# Patient Record
Sex: MALE | Race: ASIAN | NOT HISPANIC OR LATINO | URBAN - METROPOLITAN AREA
[De-identification: names, ages, dates, MRNs, and addresses within clinical notes are randomized per-mention and may not be internally consistent; named-entity substitution may affect disease eponyms.]

---

## 2017-09-25 RX ORDER — CALCIUM CARBONATE 500(1250)
1 TABLET ORAL
Qty: 0 | Refills: 0 | COMMUNITY
Start: 2017-09-25

## 2017-09-25 RX ORDER — CHOLECALCIFEROL (VITAMIN D3) 125 MCG
1 CAPSULE ORAL
Qty: 0 | Refills: 0 | DISCHARGE
Start: 2017-09-25

## 2017-10-05 ENCOUNTER — INPATIENT (INPATIENT)
Facility: HOSPITAL | Age: 67
LOS: 4 days | Discharge: HOME HEALTH | End: 2017-10-10
Attending: INTERNAL MEDICINE | Admitting: INTERNAL MEDICINE
Payer: MEDICAID

## 2017-10-05 VITALS
RESPIRATION RATE: 17 BRPM | SYSTOLIC BLOOD PRESSURE: 141 MMHG | OXYGEN SATURATION: 100 % | DIASTOLIC BLOOD PRESSURE: 90 MMHG | TEMPERATURE: 99 F | HEART RATE: 106 BPM

## 2017-10-05 DIAGNOSIS — N39.0 URINARY TRACT INFECTION, SITE NOT SPECIFIED: ICD-10-CM

## 2017-10-05 LAB
ALBUMIN SERPL ELPH-MCNC: 3.7 G/DL — SIGNIFICANT CHANGE UP (ref 3.3–5)
ALP SERPL-CCNC: 85 U/L — SIGNIFICANT CHANGE UP (ref 40–120)
ALT FLD-CCNC: 48 U/L — HIGH (ref 4–41)
APPEARANCE UR: SIGNIFICANT CHANGE UP
AST SERPL-CCNC: 34 U/L — SIGNIFICANT CHANGE UP (ref 4–40)
BACTERIA # UR AUTO: SIGNIFICANT CHANGE UP
BASE EXCESS BLDV CALC-SCNC: -1.9 MMOL/L — SIGNIFICANT CHANGE UP
BASOPHILS # BLD AUTO: 0.02 K/UL — SIGNIFICANT CHANGE UP (ref 0–0.2)
BASOPHILS NFR BLD AUTO: 0.1 % — SIGNIFICANT CHANGE UP (ref 0–2)
BILIRUB SERPL-MCNC: 0.4 MG/DL — SIGNIFICANT CHANGE UP (ref 0.2–1.2)
BILIRUB UR-MCNC: NEGATIVE — SIGNIFICANT CHANGE UP
BLOOD GAS VENOUS - CREATININE: 1.84 MG/DL — HIGH (ref 0.5–1.3)
BLOOD UR QL VISUAL: HIGH
BUN SERPL-MCNC: 23 MG/DL — SIGNIFICANT CHANGE UP (ref 7–23)
CALCIUM SERPL-MCNC: 9.4 MG/DL — SIGNIFICANT CHANGE UP (ref 8.4–10.5)
CHLORIDE BLDV-SCNC: 102 MMOL/L — SIGNIFICANT CHANGE UP (ref 96–108)
CHLORIDE SERPL-SCNC: 96 MMOL/L — LOW (ref 98–107)
CO2 SERPL-SCNC: 21 MMOL/L — LOW (ref 22–31)
COLOR SPEC: YELLOW — SIGNIFICANT CHANGE UP
CREAT SERPL-MCNC: 1.88 MG/DL — HIGH (ref 0.5–1.3)
EOSINOPHIL # BLD AUTO: 0.03 K/UL — SIGNIFICANT CHANGE UP (ref 0–0.5)
EOSINOPHIL NFR BLD AUTO: 0.2 % — SIGNIFICANT CHANGE UP (ref 0–6)
GAS PNL BLDV: 132 MMOL/L — LOW (ref 136–146)
GLUCOSE BLDV-MCNC: 181 — HIGH (ref 70–99)
GLUCOSE SERPL-MCNC: 180 MG/DL — HIGH (ref 70–99)
GLUCOSE UR-MCNC: NEGATIVE — SIGNIFICANT CHANGE UP
HCO3 BLDV-SCNC: 22 MMOL/L — SIGNIFICANT CHANGE UP (ref 20–27)
HCT VFR BLD CALC: 31.2 % — LOW (ref 39–50)
HCT VFR BLDV CALC: 32.9 % — LOW (ref 39–51)
HGB BLD-MCNC: 10.4 G/DL — LOW (ref 13–17)
HGB BLDV-MCNC: 10.7 G/DL — LOW (ref 13–17)
IMM GRANULOCYTES # BLD AUTO: 0.06 # — SIGNIFICANT CHANGE UP
IMM GRANULOCYTES NFR BLD AUTO: 0.4 % — SIGNIFICANT CHANGE UP (ref 0–1.5)
KETONES UR-MCNC: NEGATIVE — SIGNIFICANT CHANGE UP
LACTATE BLDV-MCNC: 1.1 MMOL/L — SIGNIFICANT CHANGE UP (ref 0.5–2)
LEUKOCYTE ESTERASE UR-ACNC: HIGH
LYMPHOCYTES # BLD AUTO: 1.32 K/UL — SIGNIFICANT CHANGE UP (ref 1–3.3)
LYMPHOCYTES # BLD AUTO: 9.9 % — LOW (ref 13–44)
MCHC RBC-ENTMCNC: 27 PG — SIGNIFICANT CHANGE UP (ref 27–34)
MCHC RBC-ENTMCNC: 33.3 % — SIGNIFICANT CHANGE UP (ref 32–36)
MCV RBC AUTO: 81 FL — SIGNIFICANT CHANGE UP (ref 80–100)
MONOCYTES # BLD AUTO: 1.04 K/UL — HIGH (ref 0–0.9)
MONOCYTES NFR BLD AUTO: 7.8 % — SIGNIFICANT CHANGE UP (ref 2–14)
MUCOUS THREADS # UR AUTO: SIGNIFICANT CHANGE UP
NEUTROPHILS # BLD AUTO: 10.88 K/UL — HIGH (ref 1.8–7.4)
NEUTROPHILS NFR BLD AUTO: 81.6 % — HIGH (ref 43–77)
NITRITE UR-MCNC: POSITIVE — HIGH
NRBC # FLD: 0 — SIGNIFICANT CHANGE UP
PCO2 BLDV: 40 MMHG — LOW (ref 41–51)
PH BLDV: 7.37 PH — SIGNIFICANT CHANGE UP (ref 7.32–7.43)
PH UR: 6 — SIGNIFICANT CHANGE UP (ref 4.6–8)
PLATELET # BLD AUTO: 321 K/UL — SIGNIFICANT CHANGE UP (ref 150–400)
PMV BLD: 10.1 FL — SIGNIFICANT CHANGE UP (ref 7–13)
PO2 BLDV: < 24 MMHG — LOW (ref 35–40)
POTASSIUM BLDV-SCNC: 4.2 MMOL/L — SIGNIFICANT CHANGE UP (ref 3.4–4.5)
POTASSIUM SERPL-MCNC: 4.3 MMOL/L — SIGNIFICANT CHANGE UP (ref 3.5–5.3)
POTASSIUM SERPL-SCNC: 4.3 MMOL/L — SIGNIFICANT CHANGE UP (ref 3.5–5.3)
PROT SERPL-MCNC: 8.4 G/DL — HIGH (ref 6–8.3)
PROT UR-MCNC: 100 — SIGNIFICANT CHANGE UP
RBC # BLD: 3.85 M/UL — LOW (ref 4.2–5.8)
RBC # FLD: 14 % — SIGNIFICANT CHANGE UP (ref 10.3–14.5)
RBC CASTS # UR COMP ASSIST: HIGH (ref 0–?)
SAO2 % BLDV: 36.7 % — LOW (ref 60–85)
SODIUM SERPL-SCNC: 134 MMOL/L — LOW (ref 135–145)
SP GR SPEC: 1.01 — SIGNIFICANT CHANGE UP (ref 1–1.03)
SQUAMOUS # UR AUTO: SIGNIFICANT CHANGE UP
UROBILINOGEN FLD QL: NORMAL E.U. — SIGNIFICANT CHANGE UP (ref 0.1–0.2)
WBC # BLD: 13.35 K/UL — HIGH (ref 3.8–10.5)
WBC # FLD AUTO: 13.35 K/UL — HIGH (ref 3.8–10.5)
WBC CLUMPS #/AREA URNS HPF: PRESENT — HIGH (ref 0–?)
WBC UR QL: >50 — HIGH (ref 0–?)

## 2017-10-05 PROCEDURE — 71020: CPT | Mod: 26

## 2017-10-05 PROCEDURE — 99223 1ST HOSP IP/OBS HIGH 75: CPT

## 2017-10-05 PROCEDURE — 72170 X-RAY EXAM OF PELVIS: CPT | Mod: 26

## 2017-10-05 RX ORDER — SODIUM CHLORIDE 9 MG/ML
1000 INJECTION INTRAMUSCULAR; INTRAVENOUS; SUBCUTANEOUS ONCE
Qty: 0 | Refills: 0 | Status: COMPLETED | OUTPATIENT
Start: 2017-10-05 | End: 2017-10-05

## 2017-10-05 RX ORDER — CEFTRIAXONE 500 MG/1
1 INJECTION, POWDER, FOR SOLUTION INTRAMUSCULAR; INTRAVENOUS ONCE
Qty: 0 | Refills: 0 | Status: COMPLETED | OUTPATIENT
Start: 2017-10-05 | End: 2017-10-05

## 2017-10-05 RX ORDER — ACETAMINOPHEN 500 MG
650 TABLET ORAL ONCE
Qty: 0 | Refills: 0 | Status: COMPLETED | OUTPATIENT
Start: 2017-10-05 | End: 2017-10-05

## 2017-10-05 RX ADMIN — Medication 650 MILLIGRAM(S): at 19:56

## 2017-10-05 RX ADMIN — SODIUM CHLORIDE 1000 MILLILITER(S): 9 INJECTION INTRAMUSCULAR; INTRAVENOUS; SUBCUTANEOUS at 19:52

## 2017-10-05 RX ADMIN — CEFTRIAXONE 100 GRAM(S): 500 INJECTION, POWDER, FOR SOLUTION INTRAMUSCULAR; INTRAVENOUS at 21:26

## 2017-10-05 NOTE — ED ADULT NURSE NOTE - OBJECTIVE STATEMENT
Received pt to spot 8 A&Ox4 c/o being sent by nephrologist for admission r/t fevers @ home x 2 months (unknown source) with weakness. Pt noted to ambulate with steady gait to bathroom, denies any other medical complaints, denies CP or SOB, appears comfortable on assessment, reports hx of HTN and DM on januvia. Rectal temp obtained as documented, IV placed, labs sent, VS as documented, will reassess.

## 2017-10-05 NOTE — ED PROVIDER NOTE - PROGRESS NOTE DETAILS
Pt stable. VSS. Labs, imaging reviewed. Per note, will admit to Dr. Butelr as per Dr. Virgen. Dr. Butler and MAR aware.  Elliot Haile MD PGY-4

## 2017-10-05 NOTE — ED PROVIDER NOTE - OBJECTIVE STATEMENT
65 y/o M with PMH of DM, HTN p/w fever x 2 months. Pt reports 2 months of intermittent fevers, everyday for the last week. Pt with dry cough x 1 week. Pt treated for uti x 2 over the last month, denies gu symptoms at this time. Pt also noted to have increasing JUNI. Pt denies recent travel, sick contacts, abd pain, vomiting, back pain. Renal: Param 67 y/o M with PMH of DM, HTN p/w fever x 2 months. Pt reports 2 months of intermittent fevers, everyday for the last week. Pt with dry cough x 1 week. Pt treated for uti x 2 over the last month, denies gu symptoms at this time. Pt also noted to have increasing JUNI. Pt denies recent travel, sick contacts, abd pain, vomiting, back pain. Renal: Param    Attending/Enid: 65 yo M as described above, mult med problems including DM, HTN, p/w ~ 2 months of fever mostly evening, no night sweats, no weight loss, no cough or SOB. Upon ROS noted urinary symptoms, treated early August with Cipro. No HA, CP, SOB, change in bowel habits or back pain.

## 2017-10-05 NOTE — ED PROVIDER NOTE - PHYSICAL EXAMINATION
Attending/Enid:  NAD; PERRL/EOMI, non-icterus, supple, no HILARIO, no JVD, RRR, CTAB; Abd-soft, NT/ND, no HSM; no LE edema, A&Ox3, nonfocal; Skin-warm/dry

## 2017-10-05 NOTE — ED ADULT TRIAGE NOTE - CHIEF COMPLAINT QUOTE
pt c/o of fever x 2 months, pt sent in by nephrologist for admission under MD. arce. paper work with patients states to r/o rheumatic fever. pt states "I think my MD said I have a UTI"

## 2017-10-05 NOTE — ED PROVIDER NOTE - MEDICAL DECISION MAKING DETAILS
fever, r/o sepsis/pna/uti/bacterermia, labs, ivf, cultures, cxr, ua, antipyretics, admit to med for further w/u  ankur, unclear etiology, will admit for further w/u and monitoring

## 2017-10-06 DIAGNOSIS — N40.0 BENIGN PROSTATIC HYPERPLASIA WITHOUT LOWER URINARY TRACT SYMPTOMS: ICD-10-CM

## 2017-10-06 DIAGNOSIS — E87.2 ACIDOSIS: ICD-10-CM

## 2017-10-06 DIAGNOSIS — I10 ESSENTIAL (PRIMARY) HYPERTENSION: ICD-10-CM

## 2017-10-06 DIAGNOSIS — E11.9 TYPE 2 DIABETES MELLITUS WITHOUT COMPLICATIONS: ICD-10-CM

## 2017-10-06 DIAGNOSIS — N17.9 ACUTE KIDNEY FAILURE, UNSPECIFIED: ICD-10-CM

## 2017-10-06 DIAGNOSIS — R63.4 ABNORMAL WEIGHT LOSS: ICD-10-CM

## 2017-10-06 DIAGNOSIS — A41.9 SEPSIS, UNSPECIFIED ORGANISM: ICD-10-CM

## 2017-10-06 LAB
ALBUMIN SERPL ELPH-MCNC: 2.9 G/DL — LOW (ref 3.3–5)
ALP SERPL-CCNC: 75 U/L — SIGNIFICANT CHANGE UP (ref 40–120)
ALT FLD-CCNC: 39 U/L — SIGNIFICANT CHANGE UP (ref 4–41)
AST SERPL-CCNC: 27 U/L — SIGNIFICANT CHANGE UP (ref 4–40)
B PERT DNA SPEC QL NAA+PROBE: SIGNIFICANT CHANGE UP
BASOPHILS # BLD AUTO: 0.02 K/UL — SIGNIFICANT CHANGE UP (ref 0–0.2)
BASOPHILS NFR BLD AUTO: 0.2 % — SIGNIFICANT CHANGE UP (ref 0–2)
BILIRUB SERPL-MCNC: 0.5 MG/DL — SIGNIFICANT CHANGE UP (ref 0.2–1.2)
BUN SERPL-MCNC: 22 MG/DL — SIGNIFICANT CHANGE UP (ref 7–23)
C PNEUM DNA SPEC QL NAA+PROBE: NOT DETECTED — SIGNIFICANT CHANGE UP
CALCIUM SERPL-MCNC: 8.5 MG/DL — SIGNIFICANT CHANGE UP (ref 8.4–10.5)
CHLORIDE SERPL-SCNC: 101 MMOL/L — SIGNIFICANT CHANGE UP (ref 98–107)
CO2 SERPL-SCNC: 17 MMOL/L — LOW (ref 22–31)
CREAT SERPL-MCNC: 1.76 MG/DL — HIGH (ref 0.5–1.3)
EOSINOPHIL # BLD AUTO: 0.02 K/UL — SIGNIFICANT CHANGE UP (ref 0–0.5)
EOSINOPHIL NFR BLD AUTO: 0.2 % — SIGNIFICANT CHANGE UP (ref 0–6)
FLUAV H1 2009 PAND RNA SPEC QL NAA+PROBE: NOT DETECTED — SIGNIFICANT CHANGE UP
FLUAV H1 RNA SPEC QL NAA+PROBE: NOT DETECTED — SIGNIFICANT CHANGE UP
FLUAV H3 RNA SPEC QL NAA+PROBE: NOT DETECTED — SIGNIFICANT CHANGE UP
FLUAV SUBTYP SPEC NAA+PROBE: SIGNIFICANT CHANGE UP
FLUBV RNA SPEC QL NAA+PROBE: NOT DETECTED — SIGNIFICANT CHANGE UP
GLUCOSE SERPL-MCNC: 172 MG/DL — HIGH (ref 70–99)
HADV DNA SPEC QL NAA+PROBE: NOT DETECTED — SIGNIFICANT CHANGE UP
HBA1C BLD-MCNC: 7.3 % — HIGH (ref 4–5.6)
HCOV 229E RNA SPEC QL NAA+PROBE: NOT DETECTED — SIGNIFICANT CHANGE UP
HCOV HKU1 RNA SPEC QL NAA+PROBE: NOT DETECTED — SIGNIFICANT CHANGE UP
HCOV NL63 RNA SPEC QL NAA+PROBE: NOT DETECTED — SIGNIFICANT CHANGE UP
HCOV OC43 RNA SPEC QL NAA+PROBE: NOT DETECTED — SIGNIFICANT CHANGE UP
HCT VFR BLD CALC: 26.6 % — LOW (ref 39–50)
HGB BLD-MCNC: 8.9 G/DL — LOW (ref 13–17)
HMPV RNA SPEC QL NAA+PROBE: NOT DETECTED — SIGNIFICANT CHANGE UP
HPIV1 RNA SPEC QL NAA+PROBE: NOT DETECTED — SIGNIFICANT CHANGE UP
HPIV2 RNA SPEC QL NAA+PROBE: NOT DETECTED — SIGNIFICANT CHANGE UP
HPIV3 RNA SPEC QL NAA+PROBE: NOT DETECTED — SIGNIFICANT CHANGE UP
HPIV4 RNA SPEC QL NAA+PROBE: NOT DETECTED — SIGNIFICANT CHANGE UP
IMM GRANULOCYTES # BLD AUTO: 0.07 # — SIGNIFICANT CHANGE UP
IMM GRANULOCYTES NFR BLD AUTO: 0.6 % — SIGNIFICANT CHANGE UP (ref 0–1.5)
LYMPHOCYTES # BLD AUTO: 1.26 K/UL — SIGNIFICANT CHANGE UP (ref 1–3.3)
LYMPHOCYTES # BLD AUTO: 10.7 % — LOW (ref 13–44)
M PNEUMO DNA SPEC QL NAA+PROBE: NOT DETECTED — SIGNIFICANT CHANGE UP
MAGNESIUM SERPL-MCNC: 1.7 MG/DL — SIGNIFICANT CHANGE UP (ref 1.6–2.6)
MCHC RBC-ENTMCNC: 27.1 PG — SIGNIFICANT CHANGE UP (ref 27–34)
MCHC RBC-ENTMCNC: 33.5 % — SIGNIFICANT CHANGE UP (ref 32–36)
MCV RBC AUTO: 80.9 FL — SIGNIFICANT CHANGE UP (ref 80–100)
METHOD TYPE: SIGNIFICANT CHANGE UP
MONOCYTES # BLD AUTO: 1.21 K/UL — HIGH (ref 0–0.9)
MONOCYTES NFR BLD AUTO: 10.2 % — SIGNIFICANT CHANGE UP (ref 2–14)
NEUTROPHILS # BLD AUTO: 9.23 K/UL — HIGH (ref 1.8–7.4)
NEUTROPHILS NFR BLD AUTO: 78.1 % — HIGH (ref 43–77)
NRBC # FLD: 0 — SIGNIFICANT CHANGE UP
ORGANISM # SPEC MICROSCOPIC CNT: SIGNIFICANT CHANGE UP
ORGANISM # SPEC MICROSCOPIC CNT: SIGNIFICANT CHANGE UP
PHOSPHATE SERPL-MCNC: 2.8 MG/DL — SIGNIFICANT CHANGE UP (ref 2.5–4.5)
PLATELET # BLD AUTO: 283 K/UL — SIGNIFICANT CHANGE UP (ref 150–400)
PMV BLD: 10.9 FL — SIGNIFICANT CHANGE UP (ref 7–13)
POTASSIUM SERPL-MCNC: 3.7 MMOL/L — SIGNIFICANT CHANGE UP (ref 3.5–5.3)
POTASSIUM SERPL-SCNC: 3.7 MMOL/L — SIGNIFICANT CHANGE UP (ref 3.5–5.3)
PROT SERPL-MCNC: 6.9 G/DL — SIGNIFICANT CHANGE UP (ref 6–8.3)
RBC # BLD: 3.29 M/UL — LOW (ref 4.2–5.8)
RBC # FLD: 13.9 % — SIGNIFICANT CHANGE UP (ref 10.3–14.5)
RSV RNA SPEC QL NAA+PROBE: NOT DETECTED — SIGNIFICANT CHANGE UP
RV+EV RNA SPEC QL NAA+PROBE: POSITIVE — HIGH
SODIUM SERPL-SCNC: 135 MMOL/L — SIGNIFICANT CHANGE UP (ref 135–145)
SPECIMEN SOURCE: SIGNIFICANT CHANGE UP
WBC # BLD: 11.81 K/UL — HIGH (ref 3.8–10.5)
WBC # FLD AUTO: 11.81 K/UL — HIGH (ref 3.8–10.5)

## 2017-10-06 PROCEDURE — 99222 1ST HOSP IP/OBS MODERATE 55: CPT

## 2017-10-06 RX ORDER — DEXTROSE 50 % IN WATER 50 %
25 SYRINGE (ML) INTRAVENOUS ONCE
Qty: 0 | Refills: 0 | Status: DISCONTINUED | OUTPATIENT
Start: 2017-10-06 | End: 2017-10-10

## 2017-10-06 RX ORDER — SODIUM BICARBONATE 1 MEQ/ML
650 SYRINGE (ML) INTRAVENOUS THREE TIMES A DAY
Qty: 0 | Refills: 0 | Status: DISCONTINUED | OUTPATIENT
Start: 2017-10-06 | End: 2017-10-10

## 2017-10-06 RX ORDER — SODIUM CHLORIDE 9 MG/ML
1000 INJECTION INTRAMUSCULAR; INTRAVENOUS; SUBCUTANEOUS
Qty: 0 | Refills: 0 | Status: DISCONTINUED | OUTPATIENT
Start: 2017-10-06 | End: 2017-10-10

## 2017-10-06 RX ORDER — TAMSULOSIN HYDROCHLORIDE 0.4 MG/1
0.4 CAPSULE ORAL AT BEDTIME
Qty: 0 | Refills: 0 | Status: DISCONTINUED | OUTPATIENT
Start: 2017-10-06 | End: 2017-10-10

## 2017-10-06 RX ORDER — GLUCAGON INJECTION, SOLUTION 0.5 MG/.1ML
1 INJECTION, SOLUTION SUBCUTANEOUS ONCE
Qty: 0 | Refills: 0 | Status: DISCONTINUED | OUTPATIENT
Start: 2017-10-06 | End: 2017-10-10

## 2017-10-06 RX ORDER — SODIUM CHLORIDE 9 MG/ML
1000 INJECTION, SOLUTION INTRAVENOUS
Qty: 0 | Refills: 0 | Status: DISCONTINUED | OUTPATIENT
Start: 2017-10-06 | End: 2017-10-10

## 2017-10-06 RX ORDER — ACETAMINOPHEN 500 MG
650 TABLET ORAL EVERY 6 HOURS
Qty: 0 | Refills: 0 | Status: DISCONTINUED | OUTPATIENT
Start: 2017-10-06 | End: 2017-10-10

## 2017-10-06 RX ORDER — DEXTROSE 50 % IN WATER 50 %
12.5 SYRINGE (ML) INTRAVENOUS ONCE
Qty: 0 | Refills: 0 | Status: DISCONTINUED | OUTPATIENT
Start: 2017-10-06 | End: 2017-10-10

## 2017-10-06 RX ORDER — SODIUM CHLORIDE 9 MG/ML
1000 INJECTION INTRAMUSCULAR; INTRAVENOUS; SUBCUTANEOUS
Qty: 0 | Refills: 0 | Status: DISCONTINUED | OUTPATIENT
Start: 2017-10-05 | End: 2017-10-06

## 2017-10-06 RX ORDER — INSULIN LISPRO 100/ML
VIAL (ML) SUBCUTANEOUS
Qty: 0 | Refills: 0 | Status: DISCONTINUED | OUTPATIENT
Start: 2017-10-06 | End: 2017-10-10

## 2017-10-06 RX ORDER — CEFTRIAXONE 500 MG/1
1 INJECTION, POWDER, FOR SOLUTION INTRAMUSCULAR; INTRAVENOUS EVERY 24 HOURS
Qty: 0 | Refills: 0 | Status: DISCONTINUED | OUTPATIENT
Start: 2017-10-06 | End: 2017-10-07

## 2017-10-06 RX ORDER — DEXTROSE 50 % IN WATER 50 %
1 SYRINGE (ML) INTRAVENOUS ONCE
Qty: 0 | Refills: 0 | Status: DISCONTINUED | OUTPATIENT
Start: 2017-10-06 | End: 2017-10-10

## 2017-10-06 RX ADMIN — SODIUM CHLORIDE 100 MILLILITER(S): 9 INJECTION INTRAMUSCULAR; INTRAVENOUS; SUBCUTANEOUS at 04:30

## 2017-10-06 RX ADMIN — Medication 650 MILLIGRAM(S): at 04:23

## 2017-10-06 RX ADMIN — SODIUM CHLORIDE 100 MILLILITER(S): 9 INJECTION INTRAMUSCULAR; INTRAVENOUS; SUBCUTANEOUS at 18:23

## 2017-10-06 RX ADMIN — Medication 650 MILLIGRAM(S): at 22:48

## 2017-10-06 RX ADMIN — Medication 1: at 13:06

## 2017-10-06 RX ADMIN — Medication 650 MILLIGRAM(S): at 18:23

## 2017-10-06 RX ADMIN — CEFTRIAXONE 100 GRAM(S): 500 INJECTION, POWDER, FOR SOLUTION INTRAMUSCULAR; INTRAVENOUS at 21:47

## 2017-10-06 RX ADMIN — Medication 1: at 09:06

## 2017-10-06 RX ADMIN — TAMSULOSIN HYDROCHLORIDE 0.4 MILLIGRAM(S): 0.4 CAPSULE ORAL at 22:47

## 2017-10-06 NOTE — H&P ADULT - NSHPLABSRESULTS_GEN_ALL_CORE
.  LABS:                         10.4   13.35 )-----------( 321      ( 05 Oct 2017 19:20 )             31.2     10    134<L>  |  96<L>  |  23  ----------------------------<  180<H>  4.3   |  21<L>  |  1.88<H>    Ca    9.4      05 Oct 2017 19:20    TPro  8.4<H>  /  Alb  3.7  /  TBili  0.4  /  DBili  x   /  AST  34  /  ALT  48<H>  /  AlkPhos  85  10-      Urinalysis Basic - ( 05 Oct 2017 19:30 )    Color: YELLOW / Appearance: HAZY / S.012 / pH: 6.0  Gluc: NEGATIVE / Ketone: NEGATIVE  / Bili: NEGATIVE / Urobili: NORMAL E.U.   Blood: TRACE / Protein: 100 / Nitrite: POSITIVE   Leuk Esterase: LARGE / RBC: 5-10 / WBC >50   Sq Epi: OCC / Non Sq Epi: x / Bacteria: FEW                RADIOLOGY, EKG & ADDITIONAL TESTS: Reviewed.

## 2017-10-06 NOTE — H&P ADULT - PROBLEM SELECTOR PROBLEM 4
JUNI (acute kidney injury) Type 2 diabetes mellitus without complication, without long-term current use of insulin

## 2017-10-06 NOTE — H&P ADULT - HISTORY OF PRESENT ILLNESS
67 yo M with h/o HTN, DM p.w persistent fevers for past 2 months. Pt was seen by his pcp initially who prescribed cipro for a presumed UTI but pt has not had any relief. He continues to have fevers regularly, He has no other symptoms- No dysuria, no diarrhea or constipation, no blood in stool. He recently travelled to [facility] 2 months ago. He has had a very poor appetite with poor PO intake and about 12 lb weight loss since fevers started. About a week ago he developed a non productive cough, no     ED: 141/90  106  99.0  17  100% on RA 67 yo M with h/o HTN, DM p.w persistent fevers for past 2 months. Pt was seen by his pcp initially who prescribed cipro for a presumed UTI but pt has not had any relief. He continues to have fevers regularly, He has no other symptoms- No dysuria, no diarrhea or constipation, no blood in stool. He recently travelled to [facility] 2 months ago. He has had a very poor appetite with poor PO intake and about 12 lb weight loss since fevers started. About a week ago he developed a non productive cough, non productive, no hemoptysis, no night sweats.    ED: 141/90  106  99.0  17  100% on RA

## 2017-10-06 NOTE — CONSULT NOTE ADULT - SUBJECTIVE AND OBJECTIVE BOX
"HPI: 67 yo M with h/o HTN, DM p.w persistent fevers for past 2 months. Pt was seen by his pcp initially who prescribed cipro for a presumed UTI but pt has not had any relief. He continues to have fevers regularly, He has no other symptoms- No dysuria, no diarrhea or constipation, no blood in stool. He recently travelled to [facility] 2 months ago. He has had a very poor appetite with poor PO intake and about 12 lb weight loss since fevers started. About a week ago he developed a non productive cough, non productive, no hemoptysis, no night sweats."    Saw/spoke to patient. Patient has had fevers, dysuria. Left sided abdominal pain radiating towards groin. Recently was told that he had infection of the prostate, and was given a course of Ciprofloxacin prior without improvement. Here, feels better after receiving ceftriaxone, overall feels improved. He recently returned from [facility]. He originally came to [place] ~2 [place] ago. Patient notes that he has also had weight loss over past months which he attributes to poor PO intake. Note that patient has mild, nonproductive cough, no night sweats, no other symptoms or sequelae of TB.     PAST MEDICAL & SURGICAL HISTORY:  HTN (hypertension)  DM (diabetes mellitus)  No significant past surgical history    Allergies    No Known Allergies    ANTIMICROBIALS:  cefTRIAXone   IVPB 1 every 24 hours    OTHER MEDS:  acetaminophen   Tablet 650 milliGRAM(s) Oral every 6 hours PRN  dextrose 5%. 1000 milliLiter(s) IV Continuous <Continuous>  dextrose 50% Injectable 12.5 Gram(s) IV Push once  dextrose 50% Injectable 25 Gram(s) IV Push once  dextrose 50% Injectable 25 Gram(s) IV Push once  dextrose Gel 1 Dose(s) Oral once PRN  glucagon  Injectable 1 milliGRAM(s) IntraMuscular once PRN  insulin lispro (HumaLOG) corrective regimen sliding scale   SubCutaneous three times a day before meals  sodium bicarbonate 650 milliGRAM(s) Oral three times a day  sodium chloride 0.9%. 1000 milliLiter(s) IV Continuous <Continuous>  tamsulosin 0.4 milliGRAM(s) Oral at bedtime    SOCIAL HISTORY: No tobacco, no alcohol, no illicit drugs    FAMILY HISTORY:  No pertinent family history in first degree relatives    Drug Dosing Weight  Height (cm): 162.56 (06 Oct 2017 04:02)  Weight (kg): 63.5 (06 Oct 2017 04:02)  BMI (kg/m2): 24 (06 Oct 2017 04:02)  BSA (m2): 1.68 (06 Oct 2017 04:02)    PE:    Vital Signs Last 24 Hrs  T(C): 37 (06 Oct 2017 13:35), Max: 39.2 (05 Oct 2017 19:56)  T(F): 98.6 (06 Oct 2017 13:35), Max: 102.6 (05 Oct 2017 19:56)  HR: 96 (06 Oct 2017 13:35) (82 - 106)  BP: 125/76 (06 Oct 2017 13:35) (118/58 - 150/71)  BP(mean): --  RR: 18 (06 Oct 2017 13:35) (16 - 20)  SpO2: 97% (06 Oct 2017 13:35) (95% - 100%)    Gen: AOx3, NAD, non-toxic, pleasant  CV: S1+S2 normal, no murmurs, tachycardic  Resp: Clear bilat, no resp distress, no crackles/wheezes  Abd: Soft, nontender, +BS  Ext: No LE edema, no wounds  : No Causey, no CVA tenderness, no suprapubic tenderness  IV/Skin: No thrombophlebitis, no rash  Neuro: No focal deficits, no sensory deficits    LABS:                        8.9    11.81 )-----------( 283      ( 06 Oct 2017 05:30 )             26.6     10-06    135  |  101  |  22  ----------------------------<  172<H>  3.7   |  17<L>  |  1.76<H>    Ca    8.5      06 Oct 2017 05:30  Phos  2.8     10-06  Mg     1.7     10-    TPro  6.9  /  Alb  2.9<L>  /  TBili  0.5  /  DBili  x   /  AST  27  /  ALT  39  /  AlkPhos  75  10-06    Urinalysis Basic - ( 05 Oct 2017 19:30 )    Color: YELLOW / Appearance: HAZY / S.012 / pH: 6.0  Gluc: NEGATIVE / Ketone: NEGATIVE  / Bili: NEGATIVE / Urobili: NORMAL E.U.   Blood: TRACE / Protein: 100 / Nitrite: POSITIVE   Leuk Esterase: LARGE / RBC: 5-10 / WBC >50   Sq Epi: OCC / Non Sq Epi: x / Bacteria: FEW    MICROBIOLOGY:    URINE MIDSTREAM  10-05-17 GNR    RADIOLOGY:    10/5 XR Pelv:    IMPRESSION:  No hip fractures or dislocations.    Intact pelvic and obturator rings and symmetrically aligned and spaced SI   joints and pubic symphysis.     Degenerative rim ossification along lateral left acetabular articular   margin. Otherwise preserved bilateral hip joint spaces.    No gross radiographic evidence for AVN.    No destructive osseous lesions or periosteal reaction.    CXR 10/5:    IMPRESSION:  Slight right hemidiaphragm elevation.    Clear lungs. No pleural effusions or pneumothorax.    Borderline enlarged cardiac silhouette.     Trachea midline.    Unremarkable osseous structures.

## 2017-10-06 NOTE — H&P ADULT - NSHPREVIEWOFSYSTEMS_GEN_ALL_CORE
REVIEW OF SYSTEMS:    CONSTITUTIONAL: No weakness, + fevers and chills, weight loss  EYES/ENT: No visual changes;  No dysphagia or odynophagia, no tinnitus  NECK: No pain or stiffness  RESPIRATORY: No cough, wheezing, hemoptysis; No shortness of breath  CARDIOVASCULAR: No chest pain or palpitations; No lower extremity edema  GASTROINTESTINAL: No abdominal or epigastric pain. No nausea, vomiting, or hematemesis; No diarrhea or constipation. No melena or hematochezia.  MUSCULOSKELETAL: No joint pain, swelling, erythema or warmth, no back pain  GENITOURINARY: No dysuria, frequency or hematuria, no suprapubic pain  NEUROLOGICAL: No numbness or weakness, no headache, no syncope, no gait abnormalities   SKIN: No itching, burning, rashes, or lesions   All other review of systems is negative unless indicated above.

## 2017-10-06 NOTE — H&P ADULT - PROBLEM SELECTOR PLAN 1
- Continue ceftriaxone  - f/u urine and blood cultures   - IVFs - 2/2 UTI  - Continue ceftriaxone  - f/u urine and blood cultures   - IVFs

## 2017-10-06 NOTE — CONSULT NOTE ADULT - ASSESSMENT
EKG - not in chart    a/p     1) Cardiomegaly - on chest xray not significant, will get 12 lead ekg and 2d echo, asymptomatic    2) UTI - on ceftriaxone     3) GI bleed - consider GI consult

## 2017-10-06 NOTE — CONSULT NOTE ADULT - SUBJECTIVE AND OBJECTIVE BOX
Dr. Virgen (Nephrology)  Office [phone]  Cell [phone]  Lala PATEL  Cell [phone]    HPI:  65 yo M with h/o HTN, DM p.w persistent fevers for past 2 months. Pt was seen by his pcp initially who prescribed cipro for a presumed UTI but pt has not had any relief. He continues to have fevers regularly, He has no other symptoms- No dysuria, no diarrhea or constipation, no blood in stool. He recently travelled to [facility] 2 months ago. He has had a very poor appetite with poor PO intake and about 12 lb weight loss since fevers started. About a week ago he developed a non productive cough, non productive, no hemoptysis, no night sweats.    ED: 141/90  106  99.0  17  100% on RA (06 Oct 2017 00:07)      Allergies:  No Known Allergies      PAST MEDICAL & SURGICAL HISTORY:  HTN (hypertension)  DM (diabetes mellitus)  No significant past surgical history      Home Medications Reviewed    Hospital Medications:   MEDICATIONS  (STANDING):  cefTRIAXone   IVPB 1 Gram(s) IV Intermittent every 24 hours  dextrose 5%. 1000 milliLiter(s) (50 mL/Hr) IV Continuous <Continuous>  dextrose 50% Injectable 12.5 Gram(s) IV Push once  dextrose 50% Injectable 25 Gram(s) IV Push once  dextrose 50% Injectable 25 Gram(s) IV Push once  insulin lispro (HumaLOG) corrective regimen sliding scale   SubCutaneous three times a day before meals  sodium chloride 0.9%. 1000 milliLiter(s) (100 mL/Hr) IV Continuous <Continuous>  tamsulosin 0.4 milliGRAM(s) Oral at bedtime      SOCIAL HISTORY:  Denies ETOh, Smoking,     FAMILY HISTORY:  No pertinent family history in first degree relatives      REVIEW OF SYSTEMS:  CONSTITUTIONAL: No weakness, + fevers / chills  EYES/ENT: No visual changes;  No vertigo or throat pain   NECK: No pain or stiffness  RESPIRATORY: + cough, no wheezing, hemoptysis; No shortness of breath  CARDIOVASCULAR: No chest pain or palpitations.  GASTROINTESTINAL: left lower quad pain  GENITOURINARY: No dysuria, frequency, foamy urine, urinary urgency, incontinence or hematuria  NEUROLOGICAL: No numbness or weakness  SKIN: No itching, burning, rashes, or lesions   VASCULAR: No bilateral lower extremity edema.   All other review of systems is negative unless indicated above.    VITALS:  T(F): 98.6 (10-06-17 @ 13:35), Max: 102.6 (10-05-17 @ 19:56)  HR: 96 (10-06-17 @ 13:35)  BP: 125/76 (10-06-17 @ 13:35)  RR: 18 (10-06-17 @ 13:35)  SpO2: 97% (10-06-17 @ 13:35)  Wt(kg): --    Height (cm): 162.56 (10-06 @ 04:02)  Weight (kg): 63.5 (10-06 @ 04:02)  BMI (kg/m2): 24 (10-06 @ 04:02)  BSA (m2): 1.68 (10-06 @ 04:02)    PHYSICAL EXAM:  Constitutional: NAD  HEENT: anicteric sclera, oropharynx clear, MMM  Neck: No JVD  Respiratory: CTAB, no wheezes, rales or rhonchi  Cardiovascular: S1, S2, RRR  Gastrointestinal: BS+, soft, NT/ND  Extremities: No cyanosis or clubbing. No peripheral edema  Neurological: A/O x 3, no focal deficits  Psychiatric: Normal mood, normal affect  : No CVA tenderness. No lemus.   Skin: No rashes      LABS:  10-06    135  |  101  |  22  ----------------------------<  172<H>  3.7   |  17<L>  |  1.76<H>    Ca    8.5      06 Oct 2017 05:30  Phos  2.8     10-06  Mg     1.7     10-06    TPro  6.9  /  Alb  2.9<L>  /  TBili  0.5  /  DBili      /  AST  27  /  ALT  39  /  AlkPhos  75  10-06    Creatinine Trend: 1.76 <--, 1.88 <--                        8.9    11.81 )-----------( 283      ( 06 Oct 2017 05:30 )             26.6     Urine Studies:  Urinalysis Basic - ( 05 Oct 2017 19:30 )    Color: YELLOW / Appearance: HAZY / S.012 / pH: 6.0  Gluc: NEGATIVE / Ketone: NEGATIVE  / Bili: NEGATIVE / Urobili: NORMAL E.U.   Blood: TRACE / Protein: 100 / Nitrite: POSITIVE   Leuk Esterase: LARGE / RBC: 5-10 / WBC >50   Sq Epi: OCC / Non Sq Epi:  / Bacteria: FEW          RADIOLOGY & ADDITIONAL STUDIES:

## 2017-10-06 NOTE — CONSULT NOTE ADULT - PROBLEM SELECTOR RECOMMENDATION 9
cr. 1.15 in 7/17. patient was on cipro for UTI, JUNI possible sec to cipro vs prerenal sec to poor po intake. check urine cr. eos, na. renal function is improving. continue IVF as ordered

## 2017-10-06 NOTE — H&P ADULT - PROBLEM SELECTOR PLAN 6
elisha flank pain/chills x 1.5 weeks - Poor PO intake and also concern for malignancy   - Not up to date on cancer screening. - In setting of poor PO intake   - Not up to date on cancer screening. Will need to f/u with pcp

## 2017-10-06 NOTE — CONSULT NOTE ADULT - ASSESSMENT
67 yo M with h/o HTN, DM p.w persistent fevers for past 2 months. Pt was seen by his pcp initially who prescribed cipro for a presumed UTI but pt has not had any relief. He continues to have fevers regularly. He was also referred for JUNI tri as outpatient.

## 2017-10-06 NOTE — H&P ADULT - NSHPPHYSICALEXAM_GEN_ALL_CORE
GENERAL: No acute distress, well-developed  HEAD:  Atraumatic, Normocephalic  ENT: EOMI, PERRLA, conjunctiva and sclera clear, Neck supple, No JVD, moist mucosa, no pharyngeal erythema, no tonsillar enlargement or exudate  CHEST/LUNG: Clear to auscultation bilaterally; No wheeze, equal breath sounds bilaterally   HEART: Regular rate and rhythm; No murmurs, rubs, or gallops  ABDOMEN: Soft, Nontender, Nondistended; Bowel sounds present, no organomegaly  EXTREMITIES:  2+ Peripheral Pulses, No clubbing, cyanosis, or edema  PSYCH: AAOx3  NEUROLOGY: non-focal, cranial nerves intact  SKIN: Normal color, No rashes or lesions VS: /58  HR  82  T 99.3  RR 17  100% on RA    GENERAL: No acute distress, well-developed  HEAD:  Atraumatic, Normocephalic  ENT: EOMI, PERRLA, conjunctiva and sclera clear, Neck supple, No JVD, moist mucosa, no pharyngeal erythema, no tonsillar enlargement or exudate  CHEST/LUNG: Clear to auscultation bilaterally; No wheeze, equal breath sounds bilaterally   HEART: Regular rate and rhythm; No murmurs, rubs, or gallops  ABDOMEN: Soft, Nontender, Nondistended; Bowel sounds present, no organomegaly  EXTREMITIES:  2+ Peripheral Pulses, No clubbing, cyanosis, or edema  PSYCH: AAOx3  NEUROLOGY: non-focal, cranial nerves intact  SKIN: Normal color, No rashes or lesions

## 2017-10-06 NOTE — CONSULT NOTE ADULT - SUBJECTIVE AND OBJECTIVE BOX
CHIEF COMPLAINT: 65 yo M with h/o HTN, DM p.w persistent fevers for past 2 months. Pt was seen by his pcp initially who prescribed cipro for a presumed UTI but pt has not had any relief. He continues to have fevers regularly, He has no other symptoms- No dysuria, no diarrhea or constipation, no blood in stool. He recently travelled to [facility] 2 months ago. He has had a very poor appetite with poor PO intake and about 12 lb weight loss since fevers started. About a week ago he developed a non productive cough, non productive, no hemoptysis, no night sweats. Pt denies any chest pain or SOB, does complain of seeing blood in stool day before yesterday and today, no dizziness no syncope    HISTORY OF PRESENT ILLNESS:    PAST MEDICAL & SURGICAL HISTORY:  HTN (hypertension)  DM (diabetes mellitus)  No significant past surgical history        MEDICATIONS:  tamsulosin 0.4 milliGRAM(s) Oral at bedtime    cefTRIAXone   IVPB 1 Gram(s) IV Intermittent every 24 hours      acetaminophen   Tablet 650 milliGRAM(s) Oral every 6 hours PRN      dextrose 50% Injectable 12.5 Gram(s) IV Push once  dextrose 50% Injectable 25 Gram(s) IV Push once  dextrose 50% Injectable 25 Gram(s) IV Push once  dextrose Gel 1 Dose(s) Oral once PRN  glucagon  Injectable 1 milliGRAM(s) IntraMuscular once PRN  insulin lispro (HumaLOG) corrective regimen sliding scale   SubCutaneous three times a day before meals    dextrose 5%. 1000 milliLiter(s) IV Continuous <Continuous>  sodium chloride 0.9%. 1000 milliLiter(s) IV Continuous <Continuous>      FAMILY HISTORY:  No pertinent family history in first degree relatives      SOCIAL HISTORY:    [ ] Non-smoker  [ ] Smoker  [ ] Alcohol    Allergies    No Known Allergies    Intolerances        PHYSICAL EXAM:  T(C): 37 (10-06-17 @ 13:35), Max: 39.2 (10-05-17 @ 19:56)  HR: 96 (10-06-17 @ 13:35) (82 - 106)  BP: 125/76 (10-06-17 @ 13:35) (118/58 - 150/71)  RR: 18 (10-06-17 @ 13:35) (16 - 20)  SpO2: 97% (10-06-17 @ 13:35) (95% - 100%)  Wt(kg): --  I&O's Summary      Appearance: Normal	  HEENT:   Normal oral mucosa, PERRL, EOMI	  Cardiovascular: Normal S1 S2, No JVD, No murmurs, No edema  Respiratory: Lungs clear to auscultation	  Gastrointestinal:  Soft, Non-tender, + BS	  Extremities: Normal range of motion, No clubbing, cyanosis or edema                                    8.9    11.81 )-----------( 283      ( 06 Oct 2017 05:30 )             26.6     10-06    135  |  101  |  22  ----------------------------<  172<H>  3.7   |  17<L>  |  1.76<H>    Ca    8.5      06 Oct 2017 05:30  Phos  2.8     10-06  Mg     1.7     10-06    TPro  6.9  /  Alb  2.9<L>  /  TBili  0.5  /  DBili  x   /  AST  27  /  ALT  39  /  AlkPhos  75  10-06    proBNP:   Lipid Profile:   HgA1c: Hemoglobin A1C, Whole Blood: 7.3 % (10-06 @ 05:30)    TSH:     ASSESSMENT/PLAN:

## 2017-10-06 NOTE — CONSULT NOTE ADULT - ASSESSMENT
65 yo M with h/o HTN, DM with persistent fevers for past 2 months. Pt was seen by his pcp initially who prescribed cipro for a presumed UTI but pt has not had any relief. Patient has also had weight loss, with a recent trip to [facility]. Has mild cough, nonproductive, no night sweats. No history of TB, or contacts with TB. Note that CXR is clear without any cavitary lesions or sequelae of TB. Alternatively, he does complain of dysuria and L abd pain. ? UTI vs prostatitis vs renal stone. Agree with plan for imaging which would eval for malignancy, TB, renal stones, occult GI infection.  - Ceftriaxone 1g q 24  - RVP  - CT A/P/C--if lesions on chest with ? for TB would put on airborne isolation (presently appears more likely acute infection > active pulm TB)  - F/U UCX, F/U BCX      Ernie Chapman MD  Pager [phone]  After 5pm and on weekends call [phone] 65 yo M with h/o HTN, DM with persistent fevers for past 2 months. Pt was seen by his pcp initially who prescribed cipro for a presumed UTI but pt has not had any relief. Patient has also had weight loss, with a recent trip to [facility]. Has mild cough, nonproductive, no night sweats. No history of TB, or contacts with TB. Note that CXR is clear without any cavitary lesions or sequelae of TB. Alternatively, he does complain of dysuria and L abd pain. ? UTI vs prostatitis vs renal stone. Agree with plan for imaging which would eval for malignancy, TB, renal stones, hydro, occult GI infection.  - Ceftriaxone 1g q 24  - RVP  - CT A/P/C--if lesions on chest with ? for TB would put on airborne isolation (presently appears more likely acute infection > active pulm TB)  - F/U UCX, F/U BCX      Ernie Chapman MD  Pager [phone]  After 5pm and on weekends call [phone]

## 2017-10-06 NOTE — PROVIDER CONTACT NOTE (CRITICAL VALUE NOTIFICATION) - RECOMMENDATIONS
Continue administration of Ceftriaxone IVPB, continue to monitor & repeat blood cultures in the morning.

## 2017-10-06 NOTE — H&P ADULT - PROBLEM SELECTOR PLAN 2
- Monitor BP - Likely prerenal  - IVFs  - Monitor Cr. If no improvement with fluids, consult renal - May be prerenal   - IVFs  - Monitor Cr. Consult renal - Dr Virgen

## 2017-10-06 NOTE — PROVIDER CONTACT NOTE (CRITICAL VALUE NOTIFICATION) - SITUATION
66 yr old M admitted for UTI, presenting with ongoing fevers, persistent non productive cough, weight loss, and appetite changes for the past 2 months.

## 2017-10-06 NOTE — H&P ADULT - NSHPSOCIALHISTORY_GEN_ALL_CORE
Social History:    Marital Status:    Occupation:    Lives with: Wife and son    Substance Use (street drugs): ( x ) never used  (  ) other:  Tobacco Usage:  ( x  ) never smoked   (   ) former smoker   (   ) current smoker  (     ) pack year  (        ) last cigarette date  Alcohol Usage: none           (     ) Advanced Directives: (     ) None    (      ) DNR    (     ) DNI    (     ) Health Care Proxy:

## 2017-10-06 NOTE — PROVIDER CONTACT NOTE (CRITICAL VALUE NOTIFICATION) - ACTION/TREATMENT ORDERED:
Continue administration of Ceftriaxone IVPB as per eMAR, continue to monitor & repeat blood cultures in the morning, as per Carter Mckeon NP.

## 2017-10-07 LAB
-  AMIKACIN: SIGNIFICANT CHANGE UP
-  AMPICILLIN/SULBACTAM: SIGNIFICANT CHANGE UP
-  AMPICILLIN: SIGNIFICANT CHANGE UP
-  AZTREONAM: SIGNIFICANT CHANGE UP
-  CEFAZOLIN: SIGNIFICANT CHANGE UP
-  CEFEPIME: SIGNIFICANT CHANGE UP
-  CEFOXITIN: SIGNIFICANT CHANGE UP
-  CEFTAZIDIME: SIGNIFICANT CHANGE UP
-  CEFTRIAXONE: SIGNIFICANT CHANGE UP
-  CEFUROXIME: SIGNIFICANT CHANGE UP
-  CIPROFLOXACIN: SIGNIFICANT CHANGE UP
-  ERTAPENEM: SIGNIFICANT CHANGE UP
-  GENTAMICIN: SIGNIFICANT CHANGE UP
-  IMIPENEM: SIGNIFICANT CHANGE UP
-  LEVOFLOXACIN: SIGNIFICANT CHANGE UP
-  MEROPENEM: SIGNIFICANT CHANGE UP
-  NITROFURANTOIN: SIGNIFICANT CHANGE UP
-  PIPERACILLIN/TAZOBACTAM: SIGNIFICANT CHANGE UP
-  TIGECYCLINE: SIGNIFICANT CHANGE UP
-  TOBRAMYCIN: SIGNIFICANT CHANGE UP
-  TRIMETHOPRIM/SULFAMETHOXAZOLE: SIGNIFICANT CHANGE UP
BACTERIA UR CULT: SIGNIFICANT CHANGE UP
CHLORIDE UR-SCNC: 85 MMOL/L — SIGNIFICANT CHANGE UP
CREAT ?TM UR-MCNC: 24.12 MG/DL — SIGNIFICANT CHANGE UP
CULTURE - ACID FAST SMEAR CONCENTRATED: SIGNIFICANT CHANGE UP
CULTURE - ACID FAST SMEAR CONCENTRATED: SIGNIFICANT CHANGE UP
EOSINOPHIL NFR URNS MANUAL: PRESENT — SIGNIFICANT CHANGE UP (ref 0–0)
METHOD TYPE: SIGNIFICANT CHANGE UP
ORGANISM # SPEC MICROSCOPIC CNT: SIGNIFICANT CHANGE UP
POTASSIUM UR-SCNC: 9.8 MEQ/L — SIGNIFICANT CHANGE UP
SODIUM UR-SCNC: 93 MEQ/L — SIGNIFICANT CHANGE UP
SPECIMEN SOURCE: SIGNIFICANT CHANGE UP
SPECIMEN SOURCE: SIGNIFICANT CHANGE UP

## 2017-10-07 PROCEDURE — 74176 CT ABD & PELVIS W/O CONTRAST: CPT | Mod: 26

## 2017-10-07 PROCEDURE — 99232 SBSQ HOSP IP/OBS MODERATE 35: CPT

## 2017-10-07 PROCEDURE — 71250 CT THORAX DX C-: CPT | Mod: 26

## 2017-10-07 RX ORDER — CEFEPIME 1 G/1
1000 INJECTION, POWDER, FOR SOLUTION INTRAMUSCULAR; INTRAVENOUS EVERY 12 HOURS
Qty: 0 | Refills: 0 | Status: DISCONTINUED | OUTPATIENT
Start: 2017-10-07 | End: 2017-10-09

## 2017-10-07 RX ADMIN — CEFEPIME 100 MILLIGRAM(S): 1 INJECTION, POWDER, FOR SOLUTION INTRAMUSCULAR; INTRAVENOUS at 17:25

## 2017-10-07 RX ADMIN — Medication 1: at 17:26

## 2017-10-07 RX ADMIN — Medication 650 MILLIGRAM(S): at 05:30

## 2017-10-07 RX ADMIN — Medication 650 MILLIGRAM(S): at 13:58

## 2017-10-07 RX ADMIN — Medication: at 09:21

## 2017-10-07 RX ADMIN — TAMSULOSIN HYDROCHLORIDE 0.4 MILLIGRAM(S): 0.4 CAPSULE ORAL at 21:25

## 2017-10-07 RX ADMIN — Medication 650 MILLIGRAM(S): at 21:25

## 2017-10-07 NOTE — DIETITIAN INITIAL EVALUATION ADULT. - PT NOT SOURCE
other (specify)/native language [facility] however patient is able to converse in English. Only reservation is that patient states he is [place] and does not have hearing aide in at this time but still able to converse

## 2017-10-07 NOTE — DIETITIAN INITIAL EVALUATION ADULT. - DIET TYPE
Glucdyana Shake 8oz. 2x daily (will provide additional ~440kcals, 20g protein)/consistent carbohydrate (no snacks)/DASH/TLC (sodium and cholesterol restricted diet)

## 2017-10-07 NOTE — CHART NOTE - NSCHARTNOTEFT_GEN_A_CORE
NUTRITION SERVICES                                                                                  MALNUTRITION ALERT     Attention Health Care Provider: Upon nutritional assessment by the Registered Dietitian your patient was determined to meet criteria / has evidence of the following diagnosis/diagnoses:    [ ] Mild Protein Calorie Malnutrition   [ ] Moderate Protein Calorie Malnutrition   [ X ] Severe Protein Calorie Malnutrition   [ ] Unspecified Protein Calorie Malnutrition   [ ] Underweight / BMI <19  [ ] Morbid Obesity / BMI >40      By signing this assessment you are acknowledging the diagnosis/diagnoses.       PLAN OF CARE: Refer to Initial Dietitian Evaluation or Nutrition Follow-Up Documentation for Nutritional Recommendations.    1) Continue Consistent Carbohydrate (no snack) diet which remains appropriate and adequate       2) May increase frequency of Glucerna Shake 8oz. to 3x daily (will provide additional ~660kcals, 30g protein) as long as patient can tolerate or is willing to accept.  3) Provide food preferences as requested

## 2017-10-07 NOTE — DIETITIAN INITIAL EVALUATION ADULT. - PHYSICAL APPEARANCE
Nutrition focused physical exam conducted - found signs of malnutrition [ ]absent [X]present   Subcutaneous fat loss: [severe]Orbital fat pads region,[severe]Buccal fat region,[severe]Triceps region, [severe]Ribs region  Muscle wasting: [severe]Temples region, [severe]Clavicle region, [severe]Shoulder region, [severe]Scapula region, [moderate]Interosseous region,  [moderate]thigh region, [severe]Calf region/underweight

## 2017-10-07 NOTE — DIETITIAN INITIAL EVALUATION ADULT. - OTHER INFO
Nutrition consult received for 66 Male with sepsis, h/o DM, HTN, weight loss; admitted for fevers. Patient with recent travel noted to [facility] and presents with poor appetite and 12 or more pounds of weight loss. Patient denies any issues chewing/swallowing or GI distress (nausea/vomiting/diarrhea/constipation) at this time. No known food allergies. Endorses significant weight loss that he points out is also very visible to him physically. Weight has ranged between 120# as lowest from his higher usual of 140#. Patient likes Glucerna Shake supplement provided and was encouraged to consume which he understands will promote nutritional status. Assisted patient in menu selections- prefers fish and rice for most meals, likes hard boiled eggs at breakfast. Extensive DM diet education not provided at this time given circumstance and as adequate caloric/protein requirements are prioritized at this time.

## 2017-10-07 NOTE — DIETITIAN INITIAL EVALUATION ADULT. - NS AS NUTRI INTERV MEALS SNACK
General/healthful diet/Carbohydrate - modified diet/1) Continue Consistent Carbohydrate (no snack) diet which remains appropriate and adequate     2) May increase frequency of Glucerna Shake 8oz. to 3x daily (will provide additional ~660kcals, 30g protein) as long as patient can tolerate or is willing to accept.     3) Provide food preferences as requested

## 2017-10-08 LAB
ALBUMIN SERPL ELPH-MCNC: 3.3 G/DL — SIGNIFICANT CHANGE UP (ref 3.3–5)
ALP SERPL-CCNC: 89 U/L — SIGNIFICANT CHANGE UP (ref 40–120)
ALT FLD-CCNC: 69 U/L — HIGH (ref 4–41)
AST SERPL-CCNC: 52 U/L — HIGH (ref 4–40)
BASOPHILS # BLD AUTO: 0.02 K/UL — SIGNIFICANT CHANGE UP (ref 0–0.2)
BASOPHILS NFR BLD AUTO: 0.3 % — SIGNIFICANT CHANGE UP (ref 0–2)
BILIRUB SERPL-MCNC: 0.2 MG/DL — SIGNIFICANT CHANGE UP (ref 0.2–1.2)
BUN SERPL-MCNC: 24 MG/DL — HIGH (ref 7–23)
CALCIUM SERPL-MCNC: 9.3 MG/DL — SIGNIFICANT CHANGE UP (ref 8.4–10.5)
CHLORIDE SERPL-SCNC: 101 MMOL/L — SIGNIFICANT CHANGE UP (ref 98–107)
CO2 SERPL-SCNC: 21 MMOL/L — LOW (ref 22–31)
CREAT SERPL-MCNC: 1.51 MG/DL — HIGH (ref 0.5–1.3)
CULTURE - ACID FAST SMEAR CONCENTRATED: SIGNIFICANT CHANGE UP
EOSINOPHIL # BLD AUTO: 0.15 K/UL — SIGNIFICANT CHANGE UP (ref 0–0.5)
EOSINOPHIL NFR BLD AUTO: 2 % — SIGNIFICANT CHANGE UP (ref 0–6)
GLUCOSE SERPL-MCNC: 141 MG/DL — HIGH (ref 70–99)
HCT VFR BLD CALC: 29.1 % — LOW (ref 39–50)
HGB BLD-MCNC: 9.7 G/DL — LOW (ref 13–17)
IMM GRANULOCYTES # BLD AUTO: 0.04 # — SIGNIFICANT CHANGE UP
IMM GRANULOCYTES NFR BLD AUTO: 0.5 % — SIGNIFICANT CHANGE UP (ref 0–1.5)
LYMPHOCYTES # BLD AUTO: 1.61 K/UL — SIGNIFICANT CHANGE UP (ref 1–3.3)
LYMPHOCYTES # BLD AUTO: 21.3 % — SIGNIFICANT CHANGE UP (ref 13–44)
MAGNESIUM SERPL-MCNC: 2 MG/DL — SIGNIFICANT CHANGE UP (ref 1.6–2.6)
MCHC RBC-ENTMCNC: 27.1 PG — SIGNIFICANT CHANGE UP (ref 27–34)
MCHC RBC-ENTMCNC: 33.3 % — SIGNIFICANT CHANGE UP (ref 32–36)
MCV RBC AUTO: 81.3 FL — SIGNIFICANT CHANGE UP (ref 80–100)
MONOCYTES # BLD AUTO: 0.73 K/UL — SIGNIFICANT CHANGE UP (ref 0–0.9)
MONOCYTES NFR BLD AUTO: 9.7 % — SIGNIFICANT CHANGE UP (ref 2–14)
NEUTROPHILS # BLD AUTO: 5.01 K/UL — SIGNIFICANT CHANGE UP (ref 1.8–7.4)
NEUTROPHILS NFR BLD AUTO: 66.2 % — SIGNIFICANT CHANGE UP (ref 43–77)
NRBC # FLD: 0 — SIGNIFICANT CHANGE UP
OB PNL STL: NEGATIVE — SIGNIFICANT CHANGE UP
ORGANISM # SPEC MICROSCOPIC CNT: SIGNIFICANT CHANGE UP
PHOSPHATE SERPL-MCNC: 4.4 MG/DL — SIGNIFICANT CHANGE UP (ref 2.5–4.5)
PLATELET # BLD AUTO: 322 K/UL — SIGNIFICANT CHANGE UP (ref 150–400)
PMV BLD: 9.6 FL — SIGNIFICANT CHANGE UP (ref 7–13)
POTASSIUM SERPL-MCNC: 3.9 MMOL/L — SIGNIFICANT CHANGE UP (ref 3.5–5.3)
POTASSIUM SERPL-SCNC: 3.9 MMOL/L — SIGNIFICANT CHANGE UP (ref 3.5–5.3)
PROT SERPL-MCNC: 7.6 G/DL — SIGNIFICANT CHANGE UP (ref 6–8.3)
RBC # BLD: 3.58 M/UL — LOW (ref 4.2–5.8)
RBC # FLD: 13.9 % — SIGNIFICANT CHANGE UP (ref 10.3–14.5)
SODIUM SERPL-SCNC: 139 MMOL/L — SIGNIFICANT CHANGE UP (ref 135–145)
SPECIMEN SOURCE: SIGNIFICANT CHANGE UP
WBC # BLD: 7.56 K/UL — SIGNIFICANT CHANGE UP (ref 3.8–10.5)
WBC # FLD AUTO: 7.56 K/UL — SIGNIFICANT CHANGE UP (ref 3.8–10.5)

## 2017-10-08 PROCEDURE — 99232 SBSQ HOSP IP/OBS MODERATE 35: CPT

## 2017-10-08 RX ADMIN — CEFEPIME 100 MILLIGRAM(S): 1 INJECTION, POWDER, FOR SOLUTION INTRAMUSCULAR; INTRAVENOUS at 05:01

## 2017-10-08 RX ADMIN — Medication 650 MILLIGRAM(S): at 05:01

## 2017-10-08 RX ADMIN — CEFEPIME 100 MILLIGRAM(S): 1 INJECTION, POWDER, FOR SOLUTION INTRAMUSCULAR; INTRAVENOUS at 17:41

## 2017-10-08 RX ADMIN — Medication 1: at 13:01

## 2017-10-08 RX ADMIN — Medication 1: at 17:42

## 2017-10-08 RX ADMIN — Medication 1: at 08:55

## 2017-10-08 RX ADMIN — Medication 650 MILLIGRAM(S): at 22:48

## 2017-10-08 RX ADMIN — Medication 650 MILLIGRAM(S): at 13:02

## 2017-10-08 RX ADMIN — TAMSULOSIN HYDROCHLORIDE 0.4 MILLIGRAM(S): 0.4 CAPSULE ORAL at 22:48

## 2017-10-09 DIAGNOSIS — E83.39 OTHER DISORDERS OF PHOSPHORUS METABOLISM: ICD-10-CM

## 2017-10-09 LAB
ALBUMIN SERPL ELPH-MCNC: 3.2 G/DL — LOW (ref 3.3–5)
ALP SERPL-CCNC: 88 U/L — SIGNIFICANT CHANGE UP (ref 40–120)
ALT FLD-CCNC: 79 U/L — HIGH (ref 4–41)
AST SERPL-CCNC: 52 U/L — HIGH (ref 4–40)
BASOPHILS # BLD AUTO: 0.02 K/UL — SIGNIFICANT CHANGE UP (ref 0–0.2)
BASOPHILS NFR BLD AUTO: 0.2 % — SIGNIFICANT CHANGE UP (ref 0–2)
BILIRUB SERPL-MCNC: 0.2 MG/DL — SIGNIFICANT CHANGE UP (ref 0.2–1.2)
BUN SERPL-MCNC: 29 MG/DL — HIGH (ref 7–23)
CALCIUM SERPL-MCNC: 9.4 MG/DL — SIGNIFICANT CHANGE UP (ref 8.4–10.5)
CHLORIDE SERPL-SCNC: 100 MMOL/L — SIGNIFICANT CHANGE UP (ref 98–107)
CO2 SERPL-SCNC: 23 MMOL/L — SIGNIFICANT CHANGE UP (ref 22–31)
CREAT ?TM UR-MCNC: 36.56 MG/DL — SIGNIFICANT CHANGE UP
CREAT SERPL-MCNC: 1.71 MG/DL — HIGH (ref 0.5–1.3)
EOSINOPHIL # BLD AUTO: 0.19 K/UL — SIGNIFICANT CHANGE UP (ref 0–0.5)
EOSINOPHIL NFR BLD AUTO: 2.3 % — SIGNIFICANT CHANGE UP (ref 0–6)
EOSINOPHIL NFR URNS MANUAL: SIGNIFICANT CHANGE UP (ref 0–0)
GLUCOSE SERPL-MCNC: 138 MG/DL — HIGH (ref 70–99)
HCT VFR BLD CALC: 29.3 % — LOW (ref 39–50)
HGB BLD-MCNC: 9.7 G/DL — LOW (ref 13–17)
IMM GRANULOCYTES # BLD AUTO: 0.05 # — SIGNIFICANT CHANGE UP
IMM GRANULOCYTES NFR BLD AUTO: 0.6 % — SIGNIFICANT CHANGE UP (ref 0–1.5)
LYMPHOCYTES # BLD AUTO: 2.1 K/UL — SIGNIFICANT CHANGE UP (ref 1–3.3)
LYMPHOCYTES # BLD AUTO: 25.1 % — SIGNIFICANT CHANGE UP (ref 13–44)
MAGNESIUM SERPL-MCNC: 2.3 MG/DL — SIGNIFICANT CHANGE UP (ref 1.6–2.6)
MCHC RBC-ENTMCNC: 27 PG — SIGNIFICANT CHANGE UP (ref 27–34)
MCHC RBC-ENTMCNC: 33.1 % — SIGNIFICANT CHANGE UP (ref 32–36)
MCV RBC AUTO: 81.6 FL — SIGNIFICANT CHANGE UP (ref 80–100)
METHOD TYPE: SIGNIFICANT CHANGE UP
MONOCYTES # BLD AUTO: 0.64 K/UL — SIGNIFICANT CHANGE UP (ref 0–0.9)
MONOCYTES NFR BLD AUTO: 7.6 % — SIGNIFICANT CHANGE UP (ref 2–14)
NEUTROPHILS # BLD AUTO: 5.38 K/UL — SIGNIFICANT CHANGE UP (ref 1.8–7.4)
NEUTROPHILS NFR BLD AUTO: 64.2 % — SIGNIFICANT CHANGE UP (ref 43–77)
NRBC # FLD: 0 — SIGNIFICANT CHANGE UP
PHOSPHATE SERPL-MCNC: 5 MG/DL — HIGH (ref 2.5–4.5)
PLATELET # BLD AUTO: 340 K/UL — SIGNIFICANT CHANGE UP (ref 150–400)
PMV BLD: 10.5 FL — SIGNIFICANT CHANGE UP (ref 7–13)
POTASSIUM SERPL-MCNC: 4.3 MMOL/L — SIGNIFICANT CHANGE UP (ref 3.5–5.3)
POTASSIUM SERPL-SCNC: 4.3 MMOL/L — SIGNIFICANT CHANGE UP (ref 3.5–5.3)
PROT SERPL-MCNC: 7.3 G/DL — SIGNIFICANT CHANGE UP (ref 6–8.3)
RBC # BLD: 3.59 M/UL — LOW (ref 4.2–5.8)
RBC # FLD: 13.9 % — SIGNIFICANT CHANGE UP (ref 10.3–14.5)
SODIUM SERPL-SCNC: 138 MMOL/L — SIGNIFICANT CHANGE UP (ref 135–145)
SODIUM UR-SCNC: 77 MEQ/L — SIGNIFICANT CHANGE UP
WBC # BLD: 8.38 K/UL — SIGNIFICANT CHANGE UP (ref 3.8–10.5)
WBC # FLD AUTO: 8.38 K/UL — SIGNIFICANT CHANGE UP (ref 3.8–10.5)

## 2017-10-09 PROCEDURE — 99232 SBSQ HOSP IP/OBS MODERATE 35: CPT

## 2017-10-09 RX ORDER — CEFEPIME 1 G/1
2000 INJECTION, POWDER, FOR SOLUTION INTRAMUSCULAR; INTRAVENOUS EVERY 12 HOURS
Qty: 0 | Refills: 0 | Status: DISCONTINUED | OUTPATIENT
Start: 2017-10-09 | End: 2017-10-09

## 2017-10-09 RX ORDER — SODIUM CHLORIDE 9 MG/ML
1000 INJECTION INTRAMUSCULAR; INTRAVENOUS; SUBCUTANEOUS
Qty: 0 | Refills: 0 | Status: DISCONTINUED | OUTPATIENT
Start: 2017-10-09 | End: 2017-10-10

## 2017-10-09 RX ORDER — CEFEPIME 1 G/1
2000 INJECTION, POWDER, FOR SOLUTION INTRAMUSCULAR; INTRAVENOUS EVERY 24 HOURS
Qty: 0 | Refills: 0 | Status: DISCONTINUED | OUTPATIENT
Start: 2017-10-09 | End: 2017-10-10

## 2017-10-09 RX ADMIN — SODIUM CHLORIDE 75 MILLILITER(S): 9 INJECTION INTRAMUSCULAR; INTRAVENOUS; SUBCUTANEOUS at 14:18

## 2017-10-09 RX ADMIN — TAMSULOSIN HYDROCHLORIDE 0.4 MILLIGRAM(S): 0.4 CAPSULE ORAL at 22:02

## 2017-10-09 RX ADMIN — CEFEPIME 100 MILLIGRAM(S): 1 INJECTION, POWDER, FOR SOLUTION INTRAMUSCULAR; INTRAVENOUS at 17:06

## 2017-10-09 RX ADMIN — Medication 650 MILLIGRAM(S): at 14:18

## 2017-10-09 RX ADMIN — Medication 2: at 12:09

## 2017-10-09 RX ADMIN — Medication 650 MILLIGRAM(S): at 06:27

## 2017-10-09 RX ADMIN — CEFEPIME 100 MILLIGRAM(S): 1 INJECTION, POWDER, FOR SOLUTION INTRAMUSCULAR; INTRAVENOUS at 06:26

## 2017-10-09 RX ADMIN — Medication 650 MILLIGRAM(S): at 22:02

## 2017-10-09 NOTE — PROGRESS NOTE ADULT - PROBLEM SELECTOR PLAN 1
cr. 1.15 in 7/17. patient was on cipro for UTI, urine eos positive likely AIN sec to cirpo was improving worsen today ?etiology. ? prerenal. check urine cr. eos, na. NS @ 75 x12 hrs. monitor bmp

## 2017-10-10 ENCOUNTER — TRANSCRIPTION ENCOUNTER (OUTPATIENT)
Age: 67
End: 2017-10-10

## 2017-10-10 VITALS
TEMPERATURE: 98 F | OXYGEN SATURATION: 99 % | RESPIRATION RATE: 18 BRPM | DIASTOLIC BLOOD PRESSURE: 82 MMHG | HEART RATE: 111 BPM | SYSTOLIC BLOOD PRESSURE: 136 MMHG

## 2017-10-10 LAB
-  AMIKACIN: SIGNIFICANT CHANGE UP
-  AMPICILLIN/SULBACTAM: SIGNIFICANT CHANGE UP
-  AMPICILLIN: SIGNIFICANT CHANGE UP
-  AZTREONAM: SIGNIFICANT CHANGE UP
-  CEFAZOLIN: SIGNIFICANT CHANGE UP
-  CEFEPIME: SIGNIFICANT CHANGE UP
-  CEFOXITIN: SIGNIFICANT CHANGE UP
-  CEFTAZIDIME: SIGNIFICANT CHANGE UP
-  CEFTRIAXONE: SIGNIFICANT CHANGE UP
-  CIPROFLOXACIN: SIGNIFICANT CHANGE UP
-  ERTAPENEM: SIGNIFICANT CHANGE UP
-  GENTAMICIN: SIGNIFICANT CHANGE UP
-  IMIPENEM: SIGNIFICANT CHANGE UP
-  LEVOFLOXACIN: SIGNIFICANT CHANGE UP
-  MEROPENEM: SIGNIFICANT CHANGE UP
-  PIPERACILLIN/TAZOBACTAM: SIGNIFICANT CHANGE UP
-  TIGECYCLINE: SIGNIFICANT CHANGE UP
-  TOBRAMYCIN: SIGNIFICANT CHANGE UP
-  TRIMETHOPRIM/SULFAMETHOXAZOLE: SIGNIFICANT CHANGE UP
ALBUMIN SERPL ELPH-MCNC: 3.1 G/DL — LOW (ref 3.3–5)
ALP SERPL-CCNC: 81 U/L — SIGNIFICANT CHANGE UP (ref 40–120)
ALT FLD-CCNC: 68 U/L — HIGH (ref 4–41)
AST SERPL-CCNC: 37 U/L — SIGNIFICANT CHANGE UP (ref 4–40)
BACTERIA BLD CULT: SIGNIFICANT CHANGE UP
BACTERIA BLD CULT: SIGNIFICANT CHANGE UP
BASOPHILS # BLD AUTO: 0.02 K/UL — SIGNIFICANT CHANGE UP (ref 0–0.2)
BASOPHILS NFR BLD AUTO: 0.2 % — SIGNIFICANT CHANGE UP (ref 0–2)
BILIRUB SERPL-MCNC: 0.2 MG/DL — SIGNIFICANT CHANGE UP (ref 0.2–1.2)
BUN SERPL-MCNC: 33 MG/DL — HIGH (ref 7–23)
CALCIUM SERPL-MCNC: 9.2 MG/DL — SIGNIFICANT CHANGE UP (ref 8.4–10.5)
CHLORIDE SERPL-SCNC: 102 MMOL/L — SIGNIFICANT CHANGE UP (ref 98–107)
CO2 SERPL-SCNC: 21 MMOL/L — LOW (ref 22–31)
CREAT SERPL-MCNC: 1.74 MG/DL — HIGH (ref 0.5–1.3)
E COLI DNA BLD POS QL NAA+NON-PROBE: SIGNIFICANT CHANGE UP
EOSINOPHIL # BLD AUTO: 0.18 K/UL — SIGNIFICANT CHANGE UP (ref 0–0.5)
EOSINOPHIL NFR BLD AUTO: 2 % — SIGNIFICANT CHANGE UP (ref 0–6)
GLUCOSE BLDC GLUCOMTR-MCNC: 241 MG/DL — HIGH (ref 70–99)
GLUCOSE SERPL-MCNC: 134 MG/DL — HIGH (ref 70–99)
HCT VFR BLD CALC: 26.5 % — LOW (ref 39–50)
HGB BLD-MCNC: 8.6 G/DL — LOW (ref 13–17)
IMM GRANULOCYTES # BLD AUTO: 0.03 # — SIGNIFICANT CHANGE UP
IMM GRANULOCYTES NFR BLD AUTO: 0.3 % — SIGNIFICANT CHANGE UP (ref 0–1.5)
LYMPHOCYTES # BLD AUTO: 1.97 K/UL — SIGNIFICANT CHANGE UP (ref 1–3.3)
LYMPHOCYTES # BLD AUTO: 21.8 % — SIGNIFICANT CHANGE UP (ref 13–44)
M TB TUBERC IFN-G BLD QL: 0.08 IU/ML — SIGNIFICANT CHANGE UP
M TB TUBERC IFN-G BLD QL: 0.14 IU/ML — SIGNIFICANT CHANGE UP
M TB TUBERC IFN-G BLD QL: NEGATIVE — SIGNIFICANT CHANGE UP
MAGNESIUM SERPL-MCNC: 2.2 MG/DL — SIGNIFICANT CHANGE UP (ref 1.6–2.6)
MCHC RBC-ENTMCNC: 26.4 PG — LOW (ref 27–34)
MCHC RBC-ENTMCNC: 32.5 % — SIGNIFICANT CHANGE UP (ref 32–36)
MCV RBC AUTO: 81.3 FL — SIGNIFICANT CHANGE UP (ref 80–100)
MITOGEN IGNF BCKGRD COR BLD-ACNC: 4.85 IU/ML — SIGNIFICANT CHANGE UP
MONOCYTES # BLD AUTO: 0.62 K/UL — SIGNIFICANT CHANGE UP (ref 0–0.9)
MONOCYTES NFR BLD AUTO: 6.9 % — SIGNIFICANT CHANGE UP (ref 2–14)
NEUTROPHILS # BLD AUTO: 6.22 K/UL — SIGNIFICANT CHANGE UP (ref 1.8–7.4)
NEUTROPHILS NFR BLD AUTO: 68.8 % — SIGNIFICANT CHANGE UP (ref 43–77)
NRBC # FLD: 0 — SIGNIFICANT CHANGE UP
PHOSPHATE SERPL-MCNC: 4.6 MG/DL — HIGH (ref 2.5–4.5)
PLATELET # BLD AUTO: 357 K/UL — SIGNIFICANT CHANGE UP (ref 150–400)
PMV BLD: 10 FL — SIGNIFICANT CHANGE UP (ref 7–13)
POTASSIUM SERPL-MCNC: 4.4 MMOL/L — SIGNIFICANT CHANGE UP (ref 3.5–5.3)
POTASSIUM SERPL-SCNC: 4.4 MMOL/L — SIGNIFICANT CHANGE UP (ref 3.5–5.3)
PROT SERPL-MCNC: 7.1 G/DL — SIGNIFICANT CHANGE UP (ref 6–8.3)
RBC # BLD: 3.26 M/UL — LOW (ref 4.2–5.8)
RBC # FLD: 14 % — SIGNIFICANT CHANGE UP (ref 10.3–14.5)
SODIUM SERPL-SCNC: 138 MMOL/L — SIGNIFICANT CHANGE UP (ref 135–145)
WBC # BLD: 9.04 K/UL — SIGNIFICANT CHANGE UP (ref 3.8–10.5)
WBC # FLD AUTO: 9.04 K/UL — SIGNIFICANT CHANGE UP (ref 3.8–10.5)

## 2017-10-10 PROCEDURE — 76775 US EXAM ABDO BACK WALL LIM: CPT | Mod: 26

## 2017-10-10 PROCEDURE — 99232 SBSQ HOSP IP/OBS MODERATE 35: CPT

## 2017-10-10 PROCEDURE — 36569 INSJ PICC 5 YR+ W/O IMAGING: CPT | Mod: 52,GC

## 2017-10-10 PROCEDURE — 76937 US GUIDE VASCULAR ACCESS: CPT | Mod: 26,GC

## 2017-10-10 RX ORDER — CEFEPIME 1 G/1
2 INJECTION, POWDER, FOR SOLUTION INTRAMUSCULAR; INTRAVENOUS
Qty: 2 | Refills: 0
Start: 2017-10-10 | End: 2017-10-19

## 2017-10-10 RX ORDER — SODIUM BICARBONATE 1 MEQ/ML
1 SYRINGE (ML) INTRAVENOUS
Qty: 90 | Refills: 0
Start: 2017-10-10 | End: 2017-11-09

## 2017-10-10 RX ADMIN — Medication 650 MILLIGRAM(S): at 13:09

## 2017-10-10 RX ADMIN — Medication 2: at 13:08

## 2017-10-10 RX ADMIN — CEFEPIME 100 MILLIGRAM(S): 1 INJECTION, POWDER, FOR SOLUTION INTRAMUSCULAR; INTRAVENOUS at 17:22

## 2017-10-10 RX ADMIN — Medication 2: at 17:23

## 2017-10-10 RX ADMIN — Medication 650 MILLIGRAM(S): at 05:23

## 2017-10-10 NOTE — DISCHARGE NOTE ADULT - SECONDARY DIAGNOSIS.
HTN (hypertension) Type 2 diabetes mellitus without complication, without long-term current use of insulin Sepsis, due to unspecified organism Cardiomegaly Acidosis Hyperphosphatemia

## 2017-10-10 NOTE — PROGRESS NOTE ADULT - PROBLEM SELECTOR PLAN 1
cr. 1.15 in 7/17. patient was on cipro for UTI, urine eos positive likely AIN sec to cirpo was improving worsen again. work up showed ATN FEna>1% renal function stabilizing continue to monitor bmp. cr. 1.15 in 7/17. patient was on cipro for UTI, urine eos positive likely AIN sec to cipro was improving worsen again. work up showed ATN FEna>1% renal function stabilizing continue to monitor bmp.

## 2017-10-10 NOTE — DISCHARGE NOTE ADULT - MEDICATION SUMMARY - MEDICATIONS TO STOP TAKING
I will STOP taking the medications listed below when I get home from the hospital:    Tums 500 mg oral tablet, chewable  -- 1 tab(s) by mouth once a day

## 2017-10-10 NOTE — DISCHARGE NOTE ADULT - MEDICATION SUMMARY - MEDICATIONS TO TAKE
I will START or STAY ON the medications listed below when I get home from the hospital:    aspirin 81 mg oral tablet  -- 1 tab(s) by mouth once a day  -- Indication: For Prophylaxis    sodium bicarbonate 650 mg oral tablet  -- 1 tab(s) by mouth 3 times a day  -- Indication: For Acidosis    tamsulosin 0.4 mg oral capsule  -- 1 cap(s) by mouth once a day  -- Indication: For Benign prostatic hyperplasia, unspecified whether lower urinary tract symptoms present    Januvia 50 mg oral tablet  -- 1 tab(s) by mouth once a day  -- Indication: For DM (diabetes mellitus)    cefepime 2 g/50 mL injectable solution  -- 2 gram(s) injectable once a day   Last date of administration will be 10/20/17    Dx:R78.81  -- Finish all this medication unless otherwise directed by prescriber.    -- Indication: For Urinary tract infection    Vitamin D3  -- 1 tab(s) by mouth once a day  -- Indication: For Supplement

## 2017-10-10 NOTE — DISCHARGE NOTE ADULT - CARE PLAN
Principal Discharge DX:	Urinary tract infection with hematuria, site unspecified  Goal:	No signs/symptoms of infection  Instructions for follow-up, activity and diet:	Continue with IV antibiotics as prescribed.  Last date of administration will be 10/20/17.  Follow up with your PMD within one week of discharge.  Secondary Diagnosis:	HTN (hypertension)  Instructions for follow-up, activity and diet:	Controlled.  Secondary Diagnosis:	Type 2 diabetes mellitus without complication, without long-term current use of insulin  Instructions for follow-up, activity and diet:	Continue with home medication.  Secondary Diagnosis:	Sepsis, due to unspecified organism  Instructions for follow-up, activity and diet:	Secondary to UTI.  Continue with IV antibiotics as prescribed.  Secondary Diagnosis:	Cardiomegaly  Instructions for follow-up, activity and diet:	Echo as outpatient.  Secondary Diagnosis:	Acidosis  Instructions for follow-up, activity and diet:	Continue with sodium bicarb as prescribed.  Follow up with your PMD within one week of discharge.  Secondary Diagnosis:	Hyperphosphatemia  Instructions for follow-up, activity and diet:	Continue with a low phos diet.

## 2017-10-10 NOTE — DISCHARGE NOTE ADULT - HOSPITAL COURSE
65 yo M with h/o HTN, DM presents with persistent fevers for past 2 months. Pt was seen by his pcp initially who prescribed cipro for a presumed UTI but had not had any relief. Dx Sepsis 2/2 UTI & Bacteremia.    - Ceftriaxone changed to Cefepime  on 10/7 as per Infectious Disease.  Repeat Bcx negative  - CXR - Clear lungs. No pleural effusions or pneumothorax.  - RVP positive for EnteroRhinovirus          - Likely source of cough       Left renal lesion   - Noted on CT   - Renal US with kidney stones        Essential hypertension.   - Continue with home meds      Type 2 DM  - HgbA1c 7.3%     JUNI   - Cr. 1.15 in 7/17.  - Sodium bicarbonate for acidosis  -monitor BMP  -Renal function improved    Benign prostatic hyperplasion  - Flomax.     Weight loss.   - Poor PO intake and also concern for malignancy   - Not up to date on cancer screening.  - Nutrition cx     Cardiomegaly  - Cardio Cx - mild cardiomegaly, asymptomatic --------> TTE as outpatient.    Medically cleared for d/c home with f/u to PMD

## 2017-10-10 NOTE — DISCHARGE NOTE ADULT - PLAN OF CARE
No signs/symptoms of infection Continue with IV antibiotics as prescribed.  Last date of administration will be 10/20/17.  Follow up with your PMD within one week of discharge. Controlled. Continue with home medication. Secondary to UTI.  Continue with IV antibiotics as prescribed. Echo as outpatient. Continue with sodium bicarb as prescribed.  Follow up with your PMD within one week of discharge. Continue with a low phos diet.

## 2017-10-10 NOTE — DISCHARGE NOTE ADULT - HOME CARE AGENCY
[facility] [phone], Visiting Nurse will call and Visit day after Discharge for IV Antibiotics Teaching

## 2017-10-10 NOTE — DISCHARGE NOTE ADULT - ADDITIONAL INSTRUCTIONS
Follow up with your PMD within one week of discharge.  Continue with IV antibiotics as prescribed.  Last date is 10/20/17.  Continue to monitor renal function.  Noted with some JUNI during admission (improved).  Echo as outpatient for mild cardiomegaly noted on CXR.

## 2017-10-10 NOTE — PROGRESS NOTE ADULT - PROVIDER SPECIALTY LIST ADULT
Cardiology
Infectious Disease
Internal Medicine
Nephrology
Nephrology
Infectious Disease
Internal Medicine
Nephrology

## 2017-10-10 NOTE — DISCHARGE NOTE ADULT - PATIENT PORTAL LINK FT
“You can access the [facility] Patient Portal, offered by [facility], by registering with the following website: [email or web address]”

## 2017-10-10 NOTE — DISCHARGE NOTE ADULT - CARE PROVIDER_API CALL
Thanh Farooq  Phone: [phone]  Fax: (   )    -    Joe Virgen (GIOVANNY), Internal Medicine; Nephrology  [address]  [address] [place] [address]  Phone: [phone]  Fax: [phone]

## 2017-10-10 NOTE — PROGRESS NOTE ADULT - ASSESSMENT
1.	UTI, treated with Cefipime.  2.	CKD, stable.  3.	Metabolic Acidosis, mixed but AG is too low to be certain.    PLAN:  Continue Antibiotics.   Follow up BMP.
67 yo M with h/o HTN, DM with persistent fevers for past 2 months. Pt was seen by his pcp initially who prescribed cipro for a presumed UTI but pt has not had any relief. Patient has also had weight loss, with a recent trip to [facility]. Has mild cough, nonproductive, no night sweats. No history of TB, or contacts with TB. Note that CXR is clear without any cavitary lesions or sequelae of TB.     Found to have E coli bacteremia, and urine culture noted to have E coli. Slightly odd sensitivity panel on urine culture.  Pt stable on ceftriaxone. Urine culture w/ resistance to ceftriaxone.    1) Stop ceftriaxone  2) start cefepime 1 gram IV q12 (dosed for CKD)  3) If any decompensation start polymixin 650,000 units BID for resistant organism.  4) F/up CT abd/chest  5) RVP also positive for entero/rhino as well. Supportive care.  Likely source of cough.    6) Pt on isolation for r/o TB. No clear evidence of TB. Pt to get CT chest, so will await CT chest prior to d/c'ing isolation.    d/w ADS    Rose Song MD  [phone] (pager)  [phone] (office)
a/p     1) Cardiomegaly - on chest xray not significant, get  2d echo, asymptomatic    2) UTI - on cefepime also has GNR in blood     3) URI - entero virus + r/o TB in droplet isolation
65 yo M with h/o HTN, DM p.w persistent fevers for past 2 months. Pt was seen by his pcp initially who prescribed cipro for a presumed UTI but pt has not had any relief. He continues to have fevers regularly. He was also referred for JUNI tri as outpatient.
65 yo M with h/o HTN, DM p.w persistent fevers for past 2 months. Pt was seen by his pcp initially who prescribed cipro for a presumed UTI but pt has not had any relief. He continues to have fevers regularly. He was also referred for JUNI tri as outpatient.
65 yo M with h/o HTN, DM with persistent fevers for past 2 months. Pt was seen by his pcp initially who prescribed cipro for a presumed UTI but pt has not had any relief. Patient has also had weight loss, with a recent trip to [facility]. Has mild cough, nonproductive, no night sweats. No history of TB, or contacts with TB. Note that CXR is clear without any cavitary lesions or sequelae of TB.     Found to have E coli bacteremia, and urine culture noted to have E coli. Slightly odd sensitivity panel on urine culture.  Pt stable on ceftriaxone. Urine culture w/ resistance to ceftriaxone.    1) Continue cefepime 1 gram IV q12 (dosed for CKD)    2) If any decompensation start polymixin 650,000 units BID for resistant organism.  3) F/up CT abd/chest  4) RVP also positive for entero/rhino as well. Supportive care.  Likely source of cough.    5) Pt on isolation for r/o TB. No clear evidence of TB. F/up CT chest.  Hope to d/c isolation.  Pt need 1 more AFB.        Rose Song MD  [phone] (pager)  [phone] (office)
67 yo M with h/o HTN, DM with persistent fevers for past 2 months. Pt was seen by his pcp initially who prescribed cipro for a presumed UTI but pt has not had any relief. Patient has also had weight loss, with a recent trip to [facility]. Has mild cough, nonproductive, no night sweats. No history of TB, or contacts with TB. Note that CXR is clear without any cavitary lesions or sequelae of TB. CT A/P/C negative for other signs infection. RVP with enterovirus, explaining patient's URI symptoms. AFB x 3 Neg. Low suspicion for TB. Has a CRE E. coli in UCX: but BCX is a pan S E. coli which has cleared.  - Cefepime 2g q 24  - Would DC airborne isolation, doubt TB  - Poor PO options, plan for IV Cefepime through 10/20/17; Okay for PICC line      Ernie Chapman MD  Pager [phone]  After 5pm and on weekends call [phone]
67 yo M with h/o HTN, DM with persistent fevers for past 2 months. Pt was seen by his pcp initially who prescribed cipro for a presumed UTI but pt has not had any relief. Patient has also had weight loss, with a recent trip to [facility]. Has mild cough, nonproductive, no night sweats. No history of TB, or contacts with TB. Note that CXR is clear without any cavitary lesions or sequelae of TB. CT A/P/C negative for other signs infection. RVP with enterovirus, explaining patient's URI symptoms. AFB x 3 Neg. Low suspicion for TB. Has a CRE E. coli in UCX: but BCX is a pan S E. coli which has cleared.  - Cefepime 2g q 24  - Poor PO options, plan for IV Cefepime through 10/20/17; Okay for PICC line  - No evidence for infected cyst on imaging, would treat for bacteremia duration abx then observe patient      Ernie Chapman MD  Pager [phone]  After 5pm and on weekends call [phone]
a/p     1) Cardiomegaly - on chest xray not significant, get  2d echo, asymptomatic    2) UTI - on cefepime also has GNR in blood     3) URI - entero virus + r/o TB in droplet isolation
a/p     1) Cardiomegaly - on chest xray not significant, get  2d echo, asymptomatic    2) UTI - on cefepime, grew ecoli in urine and blood, last blood cx negative    3) URI - entero virus + r/o TB in droplet isolation, AFB negative
a/p     1) Cardiomegaly - on chest xray not significant, get  2d echo, asymptomatic    2) UTI - on cefepime, grew ecoli in urine and blood, last blood cx negative    3) URI - entero virus + r/o TB in droplet isolation, AFB negative
a/p     1) Cardiomegaly - on chest xray not significant, get  2d echo, asymptomatic    2) UTI - on cefepime, grew ecoli in urine and blood, last blood cx negative s/p PICC line    3) URI - entero virus d/c TB in droplet isolation, AFB negative
a/p     1) Cardiomegaly - on chest xray not significant, get 12 lead ekg and 2d echo, asymptomatic    2) UTI - on ceftriaxone also has GNR in blood     3) URI - entero virus + r/o TB in droplet isolation
a/p     1) Cardiomegaly - on chest xray not significant, get 12 lead ekg and 2d echo, asymptomatic    2) UTI - on ceftriaxone also has GNR in blood     3) URI - entero virus + r/o TB in droplet isolation

## 2017-10-10 NOTE — PROGRESS NOTE ADULT - SUBJECTIVE AND OBJECTIVE BOX
66yPatient is a 66y old  Male who presents with a chief complaint of complain of fever and was sent by nephrologist (06 Oct 2017 00:30)      Interval history:  F/up: Fever  Found to have bacteremia w/ E coli. 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      +dysuria, LLQ abd pain. Denies flank pain.      Antimicrobials:    cefTRIAXone   IVPB 1 Gram(s) IV Intermittent every 24 hours      Vital Signs Last 24 Hrs  T(C): 37.7 (10-07-17 @ 05:28), Max: 38.4 (10-06-17 @ 18:21)  T(F): 99.8 (10-07-17 @ 05:28), Max: 101.1 (10-06-17 @ 18:21)  HR: 102 (10-07-17 @ 05:28) (92 - 102)  BP: 137/76 (10-07-17 @ 05:28) (124/77 - 137/76)  BP(mean): --  RR: 18 (10-07-17 @ 05:28) (18 - 18)  SpO2: 97% (10-07-17 @ 05:28) (97% - 97%)    Complete Blood Count + Automated Diff (10.05.17 @ 19:20)    WBC Count: 13.35 K/uL                            8.9    11.81 )-----------( 283      ( 06 Oct 2017 05:30 )             26.6   10-06    135  |  101  |  22  ----------------------------<  172<H>  3.7   |  17<L>  |  1.76<H>    Ca    8.5      06 Oct 2017 05:30  Phos  2.8     10-06  Mg     1.7     10-06    TPro  6.9  /  Alb  2.9<L>  /  TBili  0.5  /  DBili  x   /  AST  27  /  ALT  39  /  AlkPhos  75  10-06      LIVER FUNCTIONS - ( 06 Oct 2017 05:30 )  Alb: 2.9 g/dL / Pro: 6.9 g/dL / ALK PHOS: 75 u/L / ALT: 39 u/L / AST: 27 u/L / GGT: x             RECENT CULTURES:  Culture - Blood (10.05.17 @ 19:56)    -  Escherichia coli: + DETECT JUSTUS    Culture - Blood:   ***Blood Panel PCR results on this specimen are available  approximately 3 hours after the Gram stain result***  Gram stain, PCR, and/or culture results may not always  correspond due to difference in methodologies  ------------------------------------------------------------  This PCR assay was performed using [employer].  The  following targets are tested for:  Enterococcus, vancomycin  resistant enterococci, Listeria monocytogenes,  coagulase  negative staphylococci, S. aureus, methicillin resistant S.  aureus, Streptococcus agalactiae (Group B), S. pneumoniae,  S. pyogenes (Group A), Acinetobacter baumannii, Enterobacter  cloacae, E. coli, Klebsiella oxytoca, K. pneumoniae, Proteus  sp., Serratia marcescens, Haemophilus influenzae, Neisseria  meningitidis, Pseudomonas aeruginosa, Candida albicans, C.  glabrata, C. krusei, C. parapsilosis, C. tropicalis and the  KPC resistance gene.    Specimen Source: BLOOD PERIPHERAL    Organism: BLOOD CULTURE PCR    Gram Stain Blood:   ***** CRITICAL RESULT *****  PERSON CALLED / READ-BACK: SANDRA GUEVARA RN / Y  DATE / TIME CALLED: 10/06/17 1926  CALLED BY: ISRAEL CHAN^Gram Neg Rods  AFTER: 21 HOURS INCUBATION  BOTTLE: ANAEROBIC BOTTLE    Organism Identification: BLOOD CULTURE PCR    Method Type: PCR        Culture - Blood (10.05.17 @ 21:22)    Culture - Blood:   NO ORGANISMS ISOLATED  NO ORGANISMS ISOLATED AT 24 HOURS    Specimen Source: BLOOD PERIPHERAL    Culture - Urine (10.05.17 @ 20:00)    -  Amikacin: S <=16 JUSTUS    -  Ampicillin: R >16 JUSTUS    -  Ampicillin/Sulbactam: R >16/8 JUSTUS    -  Aztreonam: S <=4 JUSTUS    -  Cefazolin: R >16 JUSTUS    -  Cefepime: S <=4 JUSTUS    -  Cefoxitin: R >16 JUSTUS    -  Ceftazidime: S <=1 JUSTUS    -  Ceftriaxone: R 8 JUSTUS    -  Cefuroxime: R >16 JUSTUS    -  Ciprofloxacin: R >2 JUSTUS    -  Ertapenem: R >4 JUSTUS    -  Gentamicin: S <=4 JUSTUS    -  Imipenem: R 4 JUSTUS    -  Levofloxacin: I 4 JUSTUS    -  Meropenem: R <=1 JUSTUS    -  Nitrofurantoin: R >64 JUSTUS    -  Piperacillin/Tazobactam: S <=16 JUSTUS    -  Tigecycline: R >4 JUSTUS    -  Tobramycin: S <=4 JUSTUS    -  Trimethoprim/Sulfamethoxazole: S <=2/38 JUSTUS    Culture - Urine:   COLONY COUNT: > = 100,000 CFU/ML  The CRE label is based on newer CLSI breakpoints:  Meropenem >2 and/or  Imipenem >2  and/or  Ertapenem >1    Culture - Urine:   RESULT CALLED TO: PERRY  DATE / TIME CALLED: 10/07/17 1035  CALLED BY: RADAMES SUERO    Specimen Source: URINE MIDSTREAM    Organism Identification: Escherichia coli CRE    Organism: Escherichia coli CRE  COLONY COUNT: > = 100,000 CFU/ML  The CRE label is based on newer CLSI breakpoints:  Meropenem >2 and/or  Imipenem >2  and/or  Ertapenem >1    Method Type: MICROSCAN NEG URINE COMBO 61      Radiology:  CT chest/abd pending    < from: Xray Chest 2 Views PA/Lat (10.05.17 @ 20:49) >  MPRESSION:  Slight right hemidiaphragm elevation.    Clear lungs. Nopleural effusions or pneumothorax.    Borderline enlarged cardiac silhouette.     Trachea midline.    Unremarkable osseous structures.    < end of copied text >
INTERVAL HISTORY: pt is lying in bed comfortable, seen in am   	  MEDICATIONS:  tamsulosin 0.4 milliGRAM(s) Oral at bedtime    cefepime  IVPB 1000 milliGRAM(s) IV Intermittent every 12 hours      acetaminophen   Tablet 650 milliGRAM(s) Oral every 6 hours PRN      dextrose 50% Injectable 12.5 Gram(s) IV Push once  dextrose 50% Injectable 25 Gram(s) IV Push once  dextrose 50% Injectable 25 Gram(s) IV Push once  dextrose Gel 1 Dose(s) Oral once PRN  glucagon  Injectable 1 milliGRAM(s) IntraMuscular once PRN  insulin lispro (HumaLOG) corrective regimen sliding scale   SubCutaneous three times a day before meals    dextrose 5%. 1000 milliLiter(s) IV Continuous <Continuous>  sodium bicarbonate 650 milliGRAM(s) Oral three times a day  sodium chloride 0.9%. 1000 milliLiter(s) IV Continuous <Continuous>      PHYSICAL EXAM:  T(C): 37.1 (10-08-17 @ 04:55), Max: 37.3 (10-07-17 @ 21:19)  HR: 92 (10-08-17 @ 04:55) (89 - 103)  BP: 144/84 (10-08-17 @ 04:55) (137/89 - 148/88)  RR: 19 (10-08-17 @ 04:55) (18 - 19)  SpO2: 96% (10-08-17 @ 04:55) (95% - 100%)  Wt(kg): --  I&O's Summary        Appearance: Normal	  HEENT:   Normal oral mucosa, PERRL, EOMI	  Cardiovascular: Normal S1 S2, No JVD, No murmurs, No edema  Respiratory: Lungs clear to auscultation	  Gastrointestinal:  Soft, Non-tender, + BS	  Extremities: Normal range of motion, No clubbing, cyanosis or edema                                    9.7    7.56  )-----------( 322      ( 08 Oct 2017 08:50 )             29.1     10-08    139  |  101  |  24<H>  ----------------------------<  141<H>  3.9   |  21<L>  |  1.51<H>    Ca    9.3      08 Oct 2017 06:06  Phos  4.4     10-08  Mg     2.0     10-08    TPro  7.6  /  Alb  3.3  /  TBili  0.2  /  DBili  x   /  AST  52<H>  /  ALT  69<H>  /  AlkPhos  89  10-08    proBNP:   Lipid Profile:   HgA1c:   TSH:
RAJEEV WEISS  66y  Patient is a 66y old  Male who presents with a chief complaint of complain of fever and was sent by nephrologist (06 Oct 2017 00:30)    HPI:  This is a patient with CKD who was sent by Nephro for fevers. He has UTI and is treated for such. No new complaints today.    HEALTH ISSUES - PROBLEM Dx:  Acidosis: Acidosis  Weight loss: Weight loss  Benign prostatic hyperplasia, unspecified whether lower urinary tract symptoms present: Benign prostatic hyperplasia, unspecified whether lower urinary tract symptoms present  JUNI (acute kidney injury): JUNI (acute kidney injury)  Type 2 diabetes mellitus without complication, without long-term current use of insulin: Type 2 diabetes mellitus without complication, without long-term current use of insulin  Essential hypertension: Essential hypertension  Sepsis, due to unspecified organism: Sepsis, due to unspecified organism        MEDICATIONS  (STANDING):  cefepime  IVPB 1000 milliGRAM(s) IV Intermittent every 12 hours  dextrose 5%. 1000 milliLiter(s) (50 mL/Hr) IV Continuous <Continuous>  dextrose 50% Injectable 12.5 Gram(s) IV Push once  dextrose 50% Injectable 25 Gram(s) IV Push once  dextrose 50% Injectable 25 Gram(s) IV Push once  insulin lispro (HumaLOG) corrective regimen sliding scale   SubCutaneous three times a day before meals  sodium bicarbonate 650 milliGRAM(s) Oral three times a day  sodium chloride 0.9%. 1000 milliLiter(s) (100 mL/Hr) IV Continuous <Continuous>  tamsulosin 0.4 milliGRAM(s) Oral at bedtime    MEDICATIONS  (PRN):  acetaminophen   Tablet 650 milliGRAM(s) Oral every 6 hours PRN For Temp greater than 38 C (100.4 F)  dextrose Gel 1 Dose(s) Oral once PRN Blood Glucose LESS THAN 70 milliGRAM(s)/deciliter  glucagon  Injectable 1 milliGRAM(s) IntraMuscular once PRN Glucose LESS THAN 70 milligrams/deciliter    Vital Signs Last 24 Hrs  T(C): 36.7 (07 Oct 2017 13:44), Max: 38.4 (06 Oct 2017 18:21)  T(F): 98 (07 Oct 2017 13:44), Max: 101.1 (06 Oct 2017 18:21)  HR: 103 (07 Oct 2017 13:44) (92 - 103)  BP: 137/89 (07 Oct 2017 13:44) (124/77 - 137/89)  BP(mean): --  RR: 18 (07 Oct 2017 13:44) (18 - 18)  SpO2: 95% (07 Oct 2017 13:44) (95% - 97%)  Daily     Daily Weight in k.5 (07 Oct 2017 15:52)    PHYSICAL EXAM:  Constitutional: He  appears comfortable and not distressed. Not diaphoretic.    Neck:  The thyroid is normal. Trachea is midline.     Respiratory: The lungs are clear to auscultation. No dullness and expansion is normal.    Cardiovascular: S1 and S2 are normal. No mummurs, rubs or gallops are present.    Gastrointestinal: The abdomen is soft. No tenderness is present. No masses are present. Bowel sounds are normal.    Genitourinary: The bladder is not distended. No CVA tenderness is present.    Extremities: No edema is noted. No deformities are present.    Neurological: Cognition is normal. Tone, power and sensation are normal. Gait is steady.    Skin: No leasions are seen  or palpated.    Lymph Nodes: No lymphadenopathy is present.    Psychiatric: Mood is appropriate. No hallucinations or flight of ideas are noted.                              8.9    11.81 )-----------( 283      ( 06 Oct 2017 05:30 )             26.6     10-06    135  |  101  |  22  ----------------------------<  172<H>  3.7   |  17<L>  |  1.76<H>    Ca    8.5      06 Oct 2017 05:30  Phos  2.8     10-06  Mg     1.7     10-06    TPro  6.9  /  Alb  2.9<L>  /  TBili  0.5  /  DBili  x   /  AST  27  /  ALT  39  /  AlkPhos  75  10-06    Urinalysis Basic - ( 05 Oct 2017 19:30 )    Color: YELLOW / Appearance: HAZY / S.012 / pH: 6.0  Gluc: NEGATIVE / Ketone: NEGATIVE  / Bili: NEGATIVE / Urobili: NORMAL E.U.   Blood: TRACE / Protein: 100 / Nitrite: POSITIVE   Leuk Esterase: LARGE / RBC: 5-10 / WBC >50   Sq Epi: OCC / Non Sq Epi: x / Bacteria: FEW
66yPatient is a 66y old  Male who presents with a chief complaint of complain of fever and was sent by nephrologist (06 Oct 2017 00:30)      Interval history:  F/up: Fever  Found to have bacteremia w/ E coli. 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  No more fevers. No hypotension.    +dysuria, LLQ abd pain. Denies flank pain.      Antimicrobials:    cefepime  IVPB 1000 every 12 hours        Vital Signs Last 24 Hrs  T(F): 98.8 (10-08-17 @ 04:55), Max: 99.2 (10-07-17 @ 21:19)  HR: 92 (10-08-17 @ 04:55)  BP: 144/84 (10-08-17 @ 04:55)  RR: 19 (10-08-17 @ 04:55)  SpO2: 96% (10-08-17 @ 04:55) (95% - 100%)  Wt(kg): --    Complete Blood Count + Automated Diff (10.05.17 @ 19:20)    WBC Count: 13.35 K/uL                            8.9    11.81 )-----------( 283      ( 06 Oct 2017 05:30 )             26.6   10-06    135  |  101  |  22  ----------------------------<  172<H>  3.7   |  17<L>  |  1.76<H>    Ca    8.5      06 Oct 2017 05:30  Phos  2.8     10-06  Mg     1.7     10-06    TPro  6.9  /  Alb  2.9<L>  /  TBili  0.5  /  DBili  x   /  AST  27  /  ALT  39  /  AlkPhos  75  10-06      LIVER FUNCTIONS - ( 06 Oct 2017 05:30 )  Alb: 2.9 g/dL / Pro: 6.9 g/dL / ALK PHOS: 75 u/L / ALT: 39 u/L / AST: 27 u/L / GGT: x                                   9.7    7.56  )-----------( 322      ( 08 Oct 2017 08:50 )             29.1 10-08    139  |  101  |  24  ----------------------------<  141  3.9   |  21  |  1.51  Ca    9.3      08 Oct 2017 06:06Phos  4.4     10-08Mg     2.0     10-08  TPro  7.6  /  Alb  3.3  /  TBili  0.2  /  DBili  x   /  AST  52  /  ALT  69  /  AlkPhos  89  10-08      RECENT CULTURES:  Culture - Blood (10.05.17 @ 19:56)    -  Escherichia coli: + DETECT JUSTUS    Culture - Blood:   ***Blood Panel PCR results on this specimen are available  approximately 3 hours after the Gram stain result***  Gram stain, PCR, and/or culture results may not always  correspond due to difference in methodologies  ------------------------------------------------------------  This PCR assay was performed using [employer].  The  following targets are tested for:  Enterococcus, vancomycin  resistant enterococci, Listeria monocytogenes,  coagulase  negative staphylococci, S. aureus, methicillin resistant S.  aureus, Streptococcus agalactiae (Group B), S. pneumoniae,  S. pyogenes (Group A), Acinetobacter baumannii, Enterobacter  cloacae, E. coli, Klebsiella oxytoca, K. pneumoniae, Proteus  sp., Serratia marcescens, Haemophilus influenzae, Neisseria  meningitidis, Pseudomonas aeruginosa, Candida albicans, C.  glabrata, C. krusei, C. parapsilosis, C. tropicalis and the  KPC resistance gene.    Specimen Source: BLOOD PERIPHERAL    Organism: BLOOD CULTURE PCR    Gram Stain Blood:   ***** CRITICAL RESULT *****  PERSON CALLED / READ-BACK: SANDRA GUEVARA RN / Y  DATE / TIME CALLED: 10/06/17 1926  CALLED BY: ISRAEL CHAN^Gram Neg Rods  AFTER: 21 HOURS INCUBATION  BOTTLE: ANAEROBIC BOTTLE    Organism Identification: BLOOD CULTURE PCR    Method Type: PCR        Culture - Blood (10.05.17 @ 21:22)    Culture - Blood:   NO ORGANISMS ISOLATED  NO ORGANISMS ISOLATED AT 24 HOURS    Specimen Source: BLOOD PERIPHERAL    Culture - Urine (10.05.17 @ 20:00)    -  Amikacin: S <=16 JUSTUS    -  Ampicillin: R >16 JUSTUS    -  Ampicillin/Sulbactam: R >16/8 UJSTUS    -  Aztreonam: S <=4 JUSTUS    -  Cefazolin: R >16 JUSTUS    -  Cefepime: S <=4 JUSTUS    -  Cefoxitin: R >16 JUSTUS    -  Ceftazidime: S <=1 JUSTUS    -  Ceftriaxone: R 8 JUSTUS    -  Cefuroxime: R >16 JUSTUS    -  Ciprofloxacin: R >2 JUSTUS    -  Ertapenem: R >4 JUSTUS    -  Gentamicin: S <=4 JUSTUS    -  Imipenem: R 4 JUSTUS    -  Levofloxacin: I 4 JUSTUS    -  Meropenem: R <=1 JUSTUS    -  Nitrofurantoin: R >64 JUSTUS    -  Piperacillin/Tazobactam: S <=16 JUSTUS    -  Tigecycline: R >4 JUSTUS    -  Tobramycin: S <=4 JUSTUS    -  Trimethoprim/Sulfamethoxazole: S <=2/38 JUSTUS    Culture - Urine:   COLONY COUNT: > = 100,000 CFU/ML  The CRE label is based on newer CLSI breakpoints:  Meropenem >2 and/or  Imipenem >2  and/or  Ertapenem >1    Culture - Urine:   RESULT CALLED TO: PERRY  DATE / TIME CALLED: 10/07/17 1035  CALLED BY: RADAMES SUERO    Specimen Source: URINE MIDSTREAM    Organism Identification: Escherichia coli CRE    Organism: Escherichia coli CRE  COLONY COUNT: > = 100,000 CFU/ML  The CRE label is based on newer CLSI breakpoints:  Meropenem >2 and/or  Imipenem >2  and/or  Ertapenem >1    Method Type: MICROSCAN NEG URINE COMBO 61    Culture - Acid Fast - Sputum w/Smear . (10.07.17 @ 09:43)    Culture - Acid Fast Smear Concentrated:   AFB SMEAR= NO ACID FAST BACILLI SEEN    Specimen Source: SPUTUM    Culture - Acid Fast - Sputum w/Smear . (10.06.17 @ 17:37)    Culture - Acid Fast Smear Concentrated:   AFB SMEAR= NO ACID FAST BACILLI SEEN    Specimen Source: SPUTUM    Culture - Blood in AM (10.07.17 @ 06:52)    Culture - Blood:   NO ORGANISMS ISOLATED  NO ORGANISMS ISOLATED AT 24 HOURS    Specimen Source: BLOOD VENOUS    Culture - Blood in AM (10.07.17 @ 06:52)    Culture - Blood:   NO ORGANISMS ISOLATED  NO ORGANISMS ISOLATED AT 24 HOURS    Specimen Source: BLOOD PERIPHERAL        Radiology:  CT chest/abd pending    < from: Xray Chest 2 Views PA/Lat (10.05.17 @ 20:49) >  MPRESSION:  Slight right hemidiaphragm elevation.    Clear lungs. Nopleural effusions or pneumothorax.    Borderline enlarged cardiac silhouette.     Trachea midline.    Unremarkable osseous structures.    < end of copied text >
CC: F/U for Bacteremia    Saw/spoke to patient. No fevers, no chills, no new complaints. Feels well today.     Allergies  No Known Allergies    ANTIMICROBIALS:  cefepime  IVPB 2000 every 24 hours    PE:    Vital Signs Last 24 Hrs  T(C): 36.7 (10 Oct 2017 05:22), Max: 36.8 (09 Oct 2017 21:05)  T(F): 98 (10 Oct 2017 05:22), Max: 98.2 (09 Oct 2017 21:05)  HR: 80 (10 Oct 2017 05:22) (80 - 93)  BP: 143/82 (10 Oct 2017 05:22) (140/84 - 143/82)  BP(mean): --  RR: 18 (10 Oct 2017 05:22) (18 - 18)  SpO2: 99% (10 Oct 2017 05:22) (98% - 99%)    Gen: AOx3, NAD, non-toxic, pleasant  CV: S1+S2 normal, no murmurs, nontachycardic  Resp: Clear bilat, no resp distress, no crackles/wheezes  Abd: Soft, nontender, +BS  Ext: No LE edema, no wounds    LABS:                        8.6    9.04  )-----------( 357      ( 10 Oct 2017 05:53 )             26.5     10-10    138  |  102  |  33<H>  ----------------------------<  134<H>  4.4   |  21<L>  |  1.74<H>    Ca    9.2      10 Oct 2017 04:00  Phos  4.6     10-10  Mg     2.2     10-10    TPro  7.1  /  Alb  3.1<L>  /  TBili  0.2  /  DBili  x   /  AST  37  /  ALT  68<H>  /  AlkPhos  81  10-10    MICROBIOLOGY:    SPUTUM  10-08-17 NGTD    SPUTUM  10-07-17 NGTD    BLOOD PERIPHERAL  10-07-17 NGTD    SPUTUM  10-06-17 NGTD    BLOOD PERIPHERAL  10-05-17 NGTD    URINE MIDSTREAM  10-05-17 --  --  Escherichia coli CRE    BLOOD PERIPHERAL  10-05-17 --  --  BLOOD CULTURE PCR  Escherichia coli    RADIOLOGY:    USG Renal:    FINDINGS:    Right kidney:  10.2 cm. Multiple cysts for example in the lower pole   measuring 4 cm. No renal mass, hydronephrosis or calculi.     Left kidney:  10.6 cm. Multiple cysts. Indeterminate lesion on CT in the   left lower pole medially consistent with a 2.5 cm cyst. No renal mass,   hydronephrosis or calculi.    Urinary bladder: Within normal limits.
CC: F/U for Bacteremia    Saw/spoke to patient. No fevers, no chills. Overall well. No new complaints.    Allergies  No Known Allergies    ANTIMICROBIALS:  cefepime  IVPB 1000 every 12 hours    PE:    Vital Signs Last 24 Hrs  T(C): 36.7 (09 Oct 2017 12:40), Max: 37.1 (08 Oct 2017 22:47)  T(F): 98.1 (09 Oct 2017 12:40), Max: 98.8 (08 Oct 2017 22:47)  HR: 93 (09 Oct 2017 12:40) (80 - 93)  BP: 143/79 (09 Oct 2017 12:40) (141/73 - 144/69)  BP(mean): --  RR: 18 (09 Oct 2017 12:40) (18 - 18)  SpO2: 99% (09 Oct 2017 12:40) (99% - 100%)    Gen: AOx3, NAD, non-toxic, pleasant  CV: S1+S2 normal, no murmurs, nontachycardic  Resp: Clear bilat, no resp distress, no crackles/wheezes  Abd: Soft, nontender, +BS  Ext: No LE edema, no wounds    LABS:                        9.7    8.38  )-----------( 340      ( 09 Oct 2017 04:58 )             29.3     10-09    138  |  100  |  29<H>  ----------------------------<  138<H>  4.3   |  23  |  1.71<H>    Ca    9.4      09 Oct 2017 04:58  Phos  5.0     10-09  Mg     2.3     10-09    TPro  7.3  /  Alb  3.2<L>  /  TBili  0.2  /  DBili  x   /  AST  52<H>  /  ALT  79<H>  /  AlkPhos  88  10-09  MICROBIOLOGY:    SPUTUM  10-08-17 Neg    SPUTUM  10-07-17 Neg    BLOOD PERIPHERAL  10-07-17 GNTD    SPUTUM  10-06-17 Neg    BLOOD PERIPHERAL  10-05-17 NGTD    URINE MIDSTREAM  10-05-17 --  --  Escherichia coli CRE    BLOOD PERIPHERAL  10-05-17 --  --  BLOOD CULTURE PCR  Escherichia coli pan S    RADIOLOGY:    10/7 CT A/P/C:    IMPRESSION:  Bilateral renal cysts as well as indeterminate 2.9 cm left lower pole   renal lesion. Ultrasound can be performed for further evaluation.    LUNGS AND LARGE AIRWAYS: Patent central airways. No focal consolidation.   Mild bibasilar atelectasis.  PLEURA: No pleural effusion.  VESSELS: Atherosclerosis of the aorta. Coronary artery calcifications.  HEART: Heart size is normal. No pericardial effusion.  MEDIASTINUM AND SIXTO: No lymphadenopathy.  CHEST WALL AND LOWER NECK: Within normal limits.
Dr. Virgen (Nephrology)  Office [phone]  Cell [phone]  Lala PATEL  Cell [phone]      Patient is a 66y old  Male who presents with a chief complaint of complain of fever and was sent by nephrologist (06 Oct 2017 00:30)      Patient seen and examined at bedside. No chest pain/sob, still have LLQ abdominal tenderness     VITALS:  T(F): 98.1 (10-09-17 @ 12:40), Max: 98.8 (10-08-17 @ 22:47)  HR: 93 (10-09-17 @ 12:40)  BP: 143/79 (10-09-17 @ 12:40)  RR: 18 (10-09-17 @ 12:40)  SpO2: 99% (10-09-17 @ 12:40)  Wt(kg): --        PHYSICAL EXAM:  Constitutional: NAD  Neck: No JVD  Respiratory: CTAB, no wheezes, rales or rhonchi  Cardiovascular: S1, S2, RRR  Gastrointestinal: LLQ tenderness   Extremities: No peripheral edema    Hospital Medications:   MEDICATIONS  (STANDING):  cefepime  IVPB 1000 milliGRAM(s) IV Intermittent every 12 hours  dextrose 5%. 1000 milliLiter(s) (50 mL/Hr) IV Continuous <Continuous>  dextrose 50% Injectable 12.5 Gram(s) IV Push once  dextrose 50% Injectable 25 Gram(s) IV Push once  dextrose 50% Injectable 25 Gram(s) IV Push once  insulin lispro (HumaLOG) corrective regimen sliding scale   SubCutaneous three times a day before meals  sodium bicarbonate 650 milliGRAM(s) Oral three times a day  sodium chloride 0.9%. 1000 milliLiter(s) (100 mL/Hr) IV Continuous <Continuous>  tamsulosin 0.4 milliGRAM(s) Oral at bedtime      LABS:  10-09    138  |  100  |  29<H>  ----------------------------<  138<H>  4.3   |  23  |  1.71<H>    Ca    9.4      09 Oct 2017 04:58  Phos  5.0     10-09  Mg     2.3     10-09    TPro  7.3  /  Alb  3.2<L>  /  TBili  0.2  /  DBili      /  AST  52<H>  /  ALT  79<H>  /  AlkPhos  88  10-09    Creatinine Trend: 1.71 <--, 1.51 <--, 1.76 <--, 1.88 <--    Phosphorus Level, Serum: 5.0 mg/dL (10-09 @ 04:58)  Albumin, Serum: 3.2 g/dL (10-09 @ 04:58)                              9.7    8.38  )-----------( 340      ( 09 Oct 2017 04:58 )             29.3     Urine Studies:  Urinalysis - [10-05-17 @ 19:30]      Color YELLOW / Appearance HAZY / SG 1.012 / pH 6.0      Gluc NEGATIVE / Ketone NEGATIVE  / Bili NEGATIVE / Urobili NORMAL       Blood TRACE / Protein 100 / Leuk Est LARGE / Nitrite POSITIVE      RBC 5-10 / WBC >50 / Hyaline  / Gran  / Sq Epi OCC / Non Sq Epi  / Bacteria FEW    Urine Creatinine 24.12      [10-07-17 @ 07:30]  Urine Sodium 93      [10-07-17 @ 07:30]  Urine Potassium 9.8      [10-07-17 @ 07:30]  Urine Chloride 85      [10-07-17 @ 07:30]    HbA1c 7.3      [10-06-17 @ 05:30]        RADIOLOGY & ADDITIONAL STUDIES:
Dr. Virgen (Nephrology)  Office [phone]  Cell [phone]  Lala PATEL  Cell [phone]      Patient is a 66y old  Male who presents with a chief complaint of complain of fever and was sent by nephrologist (10 Oct 2017 12:10)      Patient seen and examined at bedside. No chest pain/sob    VITALS:  T(F): 98 (10-10-17 @ 05:22), Max: 98.2 (10-09-17 @ 21:05)  HR: 80 (10-10-17 @ 05:22)  BP: 143/82 (10-10-17 @ 05:22)  RR: 18 (10-10-17 @ 05:22)  SpO2: 99% (10-10-17 @ 05:22)  Wt(kg): --        PHYSICAL EXAM:  Constitutional: NAD  Neck: No JVD  Respiratory: CTAB, no wheezes, rales or rhonchi  Cardiovascular: S1, S2, RRR  Gastrointestinal: BS+, soft, NT/ND  Extremities: No peripheral edema    Hospital Medications:   MEDICATIONS  (STANDING):  cefepime  IVPB 2000 milliGRAM(s) IV Intermittent every 24 hours  dextrose 5%. 1000 milliLiter(s) (50 mL/Hr) IV Continuous <Continuous>  dextrose 50% Injectable 12.5 Gram(s) IV Push once  dextrose 50% Injectable 25 Gram(s) IV Push once  dextrose 50% Injectable 25 Gram(s) IV Push once  insulin lispro (HumaLOG) corrective regimen sliding scale   SubCutaneous three times a day before meals  sodium bicarbonate 650 milliGRAM(s) Oral three times a day  sodium chloride 0.9%. 1000 milliLiter(s) (100 mL/Hr) IV Continuous <Continuous>  sodium chloride 0.9%. 1000 milliLiter(s) (75 mL/Hr) IV Continuous <Continuous>  tamsulosin 0.4 milliGRAM(s) Oral at bedtime      LABS:  10-10    138  |  102  |  33<H>  ----------------------------<  134<H>  4.4   |  21<L>  |  1.74<H>    Ca    9.2      10 Oct 2017 04:00  Phos  4.6     10-10  Mg     2.2     10-10    TPro  7.1  /  Alb  3.1<L>  /  TBili  0.2  /  DBili      /  AST  37  /  ALT  68<H>  /  AlkPhos  81  10-10    Creatinine Trend: 1.74 <--, 1.71 <--, 1.51 <--, 1.76 <--, 1.88 <--    Phosphorus Level, Serum: 4.6 mg/dL (10-10 @ 04:00)  Albumin, Serum: 3.1 g/dL (10-10 @ 04:00)                              8.6    9.04  )-----------( 357      ( 10 Oct 2017 05:53 )             26.5     Urine Studies:  Urinalysis - [10-05-17 @ 19:30]      Color YELLOW / Appearance HAZY / SG 1.012 / pH 6.0      Gluc NEGATIVE / Ketone NEGATIVE  / Bili NEGATIVE / Urobili NORMAL       Blood TRACE / Protein 100 / Leuk Est LARGE / Nitrite POSITIVE      RBC 5-10 / WBC >50 / Hyaline  / Gran  / Sq Epi OCC / Non Sq Epi  / Bacteria FEW    Urine Creatinine 36.56      [10-09-17 @ 18:59]  Urine Sodium 77      [10-09-17 @ 18:59]  Urine Potassium 9.8      [10-07-17 @ 07:30]  Urine Chloride 85      [10-07-17 @ 07:30]    HbA1c 7.3      [10-06-17 @ 05:30]        RADIOLOGY & ADDITIONAL STUDIES:
INTERVAL HISTORY: pt is lying in bed comfortable  	  MEDICATIONS:  tamsulosin 0.4 milliGRAM(s) Oral at bedtime    cefepime  IVPB 1000 milliGRAM(s) IV Intermittent every 12 hours      acetaminophen   Tablet 650 milliGRAM(s) Oral every 6 hours PRN      dextrose 50% Injectable 12.5 Gram(s) IV Push once  dextrose 50% Injectable 25 Gram(s) IV Push once  dextrose 50% Injectable 25 Gram(s) IV Push once  dextrose Gel 1 Dose(s) Oral once PRN  glucagon  Injectable 1 milliGRAM(s) IntraMuscular once PRN  insulin lispro (HumaLOG) corrective regimen sliding scale   SubCutaneous three times a day before meals    dextrose 5%. 1000 milliLiter(s) IV Continuous <Continuous>  sodium bicarbonate 650 milliGRAM(s) Oral three times a day  sodium chloride 0.9%. 1000 milliLiter(s) IV Continuous <Continuous>      PHYSICAL EXAM:  T(C): 36.7 (10-09-17 @ 12:40), Max: 37.1 (10-08-17 @ 14:04)  HR: 93 (10-09-17 @ 12:40) (80 - 93)  BP: 143/79 (10-09-17 @ 12:40) (138/82 - 144/69)  RR: 18 (10-09-17 @ 12:40) (18 - 18)  SpO2: 99% (10-09-17 @ 12:40) (98% - 100%)  Wt(kg): --  I&O's Summary        Appearance: Normal	  HEENT:   Normal oral mucosa, PERRL, EOMI	  Cardiovascular: Normal S1 S2, No JVD, No murmurs, No edema  Respiratory: Lungs clear to auscultation	  Gastrointestinal:  Soft, Non-tender, + BS	  Extremities: Normal range of motion, No clubbing, cyanosis or edema                                    9.7    8.38  )-----------( 340      ( 09 Oct 2017 04:58 )             29.3     10-09    138  |  100  |  29<H>  ----------------------------<  138<H>  4.3   |  23  |  1.71<H>    Ca    9.4      09 Oct 2017 04:58  Phos  5.0     10-09  Mg     2.3     10-09    TPro  7.3  /  Alb  3.2<L>  /  TBili  0.2  /  DBili  x   /  AST  52<H>  /  ALT  79<H>  /  AlkPhos  88  10-09    proBNP:   Lipid Profile:   HgA1c:   TSH:
INTERVAL HISTORY: pt is lying in bed comfortable  	  MEDICATIONS:  tamsulosin 0.4 milliGRAM(s) Oral at bedtime    cefepime  IVPB 1000 milliGRAM(s) IV Intermittent every 12 hours      acetaminophen   Tablet 650 milliGRAM(s) Oral every 6 hours PRN      dextrose 50% Injectable 12.5 Gram(s) IV Push once  dextrose 50% Injectable 25 Gram(s) IV Push once  dextrose 50% Injectable 25 Gram(s) IV Push once  dextrose Gel 1 Dose(s) Oral once PRN  glucagon  Injectable 1 milliGRAM(s) IntraMuscular once PRN  insulin lispro (HumaLOG) corrective regimen sliding scale   SubCutaneous three times a day before meals    dextrose 5%. 1000 milliLiter(s) IV Continuous <Continuous>  sodium bicarbonate 650 milliGRAM(s) Oral three times a day  sodium chloride 0.9%. 1000 milliLiter(s) IV Continuous <Continuous>      PHYSICAL EXAM:  T(C): 36.7 (10-09-17 @ 12:40), Max: 37.1 (10-08-17 @ 14:04)  HR: 93 (10-09-17 @ 12:40) (80 - 93)  BP: 143/79 (10-09-17 @ 12:40) (138/82 - 144/69)  RR: 18 (10-09-17 @ 12:40) (18 - 18)  SpO2: 99% (10-09-17 @ 12:40) (98% - 100%)  Wt(kg): --  I&O's Summary        Appearance: Normal	  HEENT:   Normal oral mucosa, PERRL, EOMI	  Cardiovascular: Normal S1 S2, No JVD, No murmurs, No edema  Respiratory: Lungs clear to auscultation	  Gastrointestinal:  Soft, Non-tender, + BS	  Extremities: Normal range of motion, No clubbing, cyanosis or edema                                    9.7    8.38  )-----------( 340      ( 09 Oct 2017 04:58 )             29.3     10-09    138  |  100  |  29<H>  ----------------------------<  138<H>  4.3   |  23  |  1.71<H>    Ca    9.4      09 Oct 2017 04:58  Phos  5.0     10-09  Mg     2.3     10-09    TPro  7.3  /  Alb  3.2<L>  /  TBili  0.2  /  DBili  x   /  AST  52<H>  /  ALT  79<H>  /  AlkPhos  88  10-09    proBNP:   Lipid Profile:   HgA1c:   TSH:
INTERVAL HISTORY: pt is lying in bed comfortable, not in distress  	  MEDICATIONS:  tamsulosin 0.4 milliGRAM(s) Oral at bedtime    cefTRIAXone   IVPB 1 Gram(s) IV Intermittent every 24 hours      acetaminophen   Tablet 650 milliGRAM(s) Oral every 6 hours PRN      dextrose 50% Injectable 12.5 Gram(s) IV Push once  dextrose 50% Injectable 25 Gram(s) IV Push once  dextrose 50% Injectable 25 Gram(s) IV Push once  dextrose Gel 1 Dose(s) Oral once PRN  glucagon  Injectable 1 milliGRAM(s) IntraMuscular once PRN  insulin lispro (HumaLOG) corrective regimen sliding scale   SubCutaneous three times a day before meals    dextrose 5%. 1000 milliLiter(s) IV Continuous <Continuous>  sodium bicarbonate 650 milliGRAM(s) Oral three times a day  sodium chloride 0.9%. 1000 milliLiter(s) IV Continuous <Continuous>      PHYSICAL EXAM:  T(C): 37.7 (10-07-17 @ 05:28), Max: 38.4 (10-06-17 @ 18:21)  HR: 102 (10-07-17 @ 05:28) (92 - 102)  BP: 137/76 (10-07-17 @ 05:28) (124/77 - 137/76)  RR: 18 (10-07-17 @ 05:28) (18 - 18)  SpO2: 97% (10-07-17 @ 05:28) (97% - 97%)  Wt(kg): --  I&O's Summary        Appearance: Normal	  HEENT:   Normal oral mucosa, PERRL, EOMI	  Cardiovascular: Normal S1 S2, No JVD, No murmurs, No edema  Respiratory: Lungs clear to auscultation	  Gastrointestinal:  Soft, Non-tender, + BS	  Extremities: Normal range of motion, No clubbing, cyanosis or edema                                    8.9    11.81 )-----------( 283      ( 06 Oct 2017 05:30 )             26.6     10-06    135  |  101  |  22  ----------------------------<  172<H>  3.7   |  17<L>  |  1.76<H>    Ca    8.5      06 Oct 2017 05:30  Phos  2.8     10-06  Mg     1.7     10-06    TPro  6.9  /  Alb  2.9<L>  /  TBili  0.5  /  DBili  x   /  AST  27  /  ALT  39  /  AlkPhos  75  10-06    proBNP:   Lipid Profile:   HgA1c:   TSH:
INTERVAL HISTORY: pt is lying in bed comfortable, not in distress  	  MEDICATIONS:  tamsulosin 0.4 milliGRAM(s) Oral at bedtime    cefTRIAXone   IVPB 1 Gram(s) IV Intermittent every 24 hours      acetaminophen   Tablet 650 milliGRAM(s) Oral every 6 hours PRN      dextrose 50% Injectable 12.5 Gram(s) IV Push once  dextrose 50% Injectable 25 Gram(s) IV Push once  dextrose 50% Injectable 25 Gram(s) IV Push once  dextrose Gel 1 Dose(s) Oral once PRN  glucagon  Injectable 1 milliGRAM(s) IntraMuscular once PRN  insulin lispro (HumaLOG) corrective regimen sliding scale   SubCutaneous three times a day before meals    dextrose 5%. 1000 milliLiter(s) IV Continuous <Continuous>  sodium bicarbonate 650 milliGRAM(s) Oral three times a day  sodium chloride 0.9%. 1000 milliLiter(s) IV Continuous <Continuous>      PHYSICAL EXAM:  T(C): 37.7 (10-07-17 @ 05:28), Max: 38.4 (10-06-17 @ 18:21)  HR: 102 (10-07-17 @ 05:28) (92 - 102)  BP: 137/76 (10-07-17 @ 05:28) (124/77 - 137/76)  RR: 18 (10-07-17 @ 05:28) (18 - 18)  SpO2: 97% (10-07-17 @ 05:28) (97% - 97%)  Wt(kg): --  I&O's Summary        Appearance: Normal	  HEENT:   Normal oral mucosa, PERRL, EOMI	  Cardiovascular: Normal S1 S2, No JVD, No murmurs, No edema  Respiratory: Lungs clear to auscultation	  Gastrointestinal:  Soft, Non-tender, + BS	  Extremities: Normal range of motion, No clubbing, cyanosis or edema                                    8.9    11.81 )-----------( 283      ( 06 Oct 2017 05:30 )             26.6     10-06    135  |  101  |  22  ----------------------------<  172<H>  3.7   |  17<L>  |  1.76<H>    Ca    8.5      06 Oct 2017 05:30  Phos  2.8     10-06  Mg     1.7     10-06    TPro  6.9  /  Alb  2.9<L>  /  TBili  0.5  /  DBili  x   /  AST  27  /  ALT  39  /  AlkPhos  75  10-06    proBNP:   Lipid Profile:   HgA1c:   TSH:
INTERVAL HISTORY: pt is lying in bed comfortable, not in distress  	  MEDICATIONS:  tamsulosin 0.4 milliGRAM(s) Oral at bedtime    cefepime  IVPB 2000 milliGRAM(s) IV Intermittent every 24 hours      acetaminophen   Tablet 650 milliGRAM(s) Oral every 6 hours PRN      dextrose 50% Injectable 12.5 Gram(s) IV Push once  dextrose 50% Injectable 25 Gram(s) IV Push once  dextrose 50% Injectable 25 Gram(s) IV Push once  dextrose Gel 1 Dose(s) Oral once PRN  glucagon  Injectable 1 milliGRAM(s) IntraMuscular once PRN  insulin lispro (HumaLOG) corrective regimen sliding scale   SubCutaneous three times a day before meals    dextrose 5%. 1000 milliLiter(s) IV Continuous <Continuous>  sodium bicarbonate 650 milliGRAM(s) Oral three times a day  sodium chloride 0.9%. 1000 milliLiter(s) IV Continuous <Continuous>  sodium chloride 0.9%. 1000 milliLiter(s) IV Continuous <Continuous>      PHYSICAL EXAM:  T(C): 36.7 (10-10-17 @ 05:22), Max: 36.8 (10-09-17 @ 21:05)  HR: 80 (10-10-17 @ 05:22) (80 - 93)  BP: 143/82 (10-10-17 @ 05:22) (140/84 - 143/82)  RR: 18 (10-10-17 @ 05:22) (18 - 18)  SpO2: 99% (10-10-17 @ 05:22) (98% - 99%)  Wt(kg): --  I&O's Summary        Appearance: Normal	  HEENT:   Normal oral mucosa, PERRL, EOMI	  Cardiovascular: Normal S1 S2, No JVD, No murmurs, No edema  Respiratory: Lungs clear to auscultation	  Gastrointestinal:  Soft, Non-tender, + BS	  Extremities: Normal range of motion, No clubbing, cyanosis or edema                                    8.6    9.04  )-----------( 357      ( 10 Oct 2017 05:53 )             26.5     10-10    138  |  102  |  33<H>  ----------------------------<  134<H>  4.4   |  21<L>  |  1.74<H>    Ca    9.2      10 Oct 2017 04:00  Phos  4.6     10-10  Mg     2.2     10-10    TPro  7.1  /  Alb  3.1<L>  /  TBili  0.2  /  DBili  x   /  AST  37  /  ALT  68<H>  /  AlkPhos  81  10-10    proBNP:   Lipid Profile:   HgA1c:   TSH:
INTERVAL HISTORY: pt is lying in bed comfortable, seen in am   	  MEDICATIONS:  tamsulosin 0.4 milliGRAM(s) Oral at bedtime    cefepime  IVPB 1000 milliGRAM(s) IV Intermittent every 12 hours      acetaminophen   Tablet 650 milliGRAM(s) Oral every 6 hours PRN      dextrose 50% Injectable 12.5 Gram(s) IV Push once  dextrose 50% Injectable 25 Gram(s) IV Push once  dextrose 50% Injectable 25 Gram(s) IV Push once  dextrose Gel 1 Dose(s) Oral once PRN  glucagon  Injectable 1 milliGRAM(s) IntraMuscular once PRN  insulin lispro (HumaLOG) corrective regimen sliding scale   SubCutaneous three times a day before meals    dextrose 5%. 1000 milliLiter(s) IV Continuous <Continuous>  sodium bicarbonate 650 milliGRAM(s) Oral three times a day  sodium chloride 0.9%. 1000 milliLiter(s) IV Continuous <Continuous>      PHYSICAL EXAM:  T(C): 37.1 (10-08-17 @ 04:55), Max: 37.3 (10-07-17 @ 21:19)  HR: 92 (10-08-17 @ 04:55) (89 - 103)  BP: 144/84 (10-08-17 @ 04:55) (137/89 - 148/88)  RR: 19 (10-08-17 @ 04:55) (18 - 19)  SpO2: 96% (10-08-17 @ 04:55) (95% - 100%)  Wt(kg): --  I&O's Summary        Appearance: Normal	  HEENT:   Normal oral mucosa, PERRL, EOMI	  Cardiovascular: Normal S1 S2, No JVD, No murmurs, No edema  Respiratory: Lungs clear to auscultation	  Gastrointestinal:  Soft, Non-tender, + BS	  Extremities: Normal range of motion, No clubbing, cyanosis or edema                                    9.7    7.56  )-----------( 322      ( 08 Oct 2017 08:50 )             29.1     10-08    139  |  101  |  24<H>  ----------------------------<  141<H>  3.9   |  21<L>  |  1.51<H>    Ca    9.3      08 Oct 2017 06:06  Phos  4.4     10-08  Mg     2.0     10-08    TPro  7.6  /  Alb  3.3  /  TBili  0.2  /  DBili  x   /  AST  52<H>  /  ALT  69<H>  /  AlkPhos  89  10-08    proBNP:   Lipid Profile:   HgA1c:   TSH:
pt was evaluated by hospitalist earlier during the day, labs, vitals, consults reviewed, discussed management plan with pt and pots family

## 2017-10-10 NOTE — PROCEDURE NOTE - NSPROCDETAILS_GEN_ALL_CORE
sterile dressing applied/sterile technique, catheter placed/location identified, draped/prepped, sterile technique used

## 2017-10-12 LAB
BACTERIA BLD CULT: SIGNIFICANT CHANGE UP
BACTERIA BLD CULT: SIGNIFICANT CHANGE UP

## 2017-11-17 LAB — ACID FAST STN SPT: SIGNIFICANT CHANGE UP

## 2017-11-18 LAB
ACID FAST STN SPT: SIGNIFICANT CHANGE UP
ACID FAST STN SPT: SIGNIFICANT CHANGE UP

## 2019-12-11 NOTE — DISCHARGE NOTE ADULT - LAUNCH MEDICATION RECONCILIATION
F: none  E: replete electrolytes K< 4, Mg <2  N: DASH/TLC diet  DVT PPx: Lovenox  Code status: Full Code <<-----Click here for Discharge Medication Review

## 2020-08-14 ENCOUNTER — EMERGENCY (EMERGENCY)
Facility: HOSPITAL | Age: 70
LOS: 1 days | Discharge: HOME | End: 2020-08-14
Attending: EMERGENCY MEDICINE | Admitting: EMERGENCY MEDICINE
Payer: MEDICAID

## 2020-08-14 VITALS
SYSTOLIC BLOOD PRESSURE: 144 MMHG | DIASTOLIC BLOOD PRESSURE: 75 MMHG | RESPIRATION RATE: 18 BRPM | TEMPERATURE: 99 F | OXYGEN SATURATION: 97 % | HEART RATE: 101 BPM

## 2020-08-14 VITALS — OXYGEN SATURATION: 97 %

## 2020-08-14 PROBLEM — E11.9 TYPE 2 DIABETES MELLITUS WITHOUT COMPLICATIONS: Chronic | Status: ACTIVE | Noted: 2017-10-05

## 2020-08-14 PROBLEM — I10 ESSENTIAL (PRIMARY) HYPERTENSION: Chronic | Status: ACTIVE | Noted: 2017-10-05

## 2020-08-14 LAB
ALBUMIN SERPL ELPH-MCNC: 3.8 G/DL — SIGNIFICANT CHANGE UP (ref 3.3–5)
ALP SERPL-CCNC: 64 U/L — SIGNIFICANT CHANGE UP (ref 40–120)
ALT FLD-CCNC: 9 U/L — SIGNIFICANT CHANGE UP (ref 4–41)
ANION GAP SERPL CALC-SCNC: 15 MMO/L — HIGH (ref 7–14)
APPEARANCE UR: CLEAR — SIGNIFICANT CHANGE UP
AST SERPL-CCNC: 16 U/L — SIGNIFICANT CHANGE UP (ref 4–40)
BACTERIA # UR AUTO: NEGATIVE — SIGNIFICANT CHANGE UP
BASE EXCESS BLDV CALC-SCNC: -0.5 MMOL/L — SIGNIFICANT CHANGE UP
BASOPHILS # BLD AUTO: 0.02 K/UL — SIGNIFICANT CHANGE UP (ref 0–0.2)
BASOPHILS NFR BLD AUTO: 0.3 % — SIGNIFICANT CHANGE UP (ref 0–2)
BILIRUB SERPL-MCNC: 0.3 MG/DL — SIGNIFICANT CHANGE UP (ref 0.2–1.2)
BILIRUB UR-MCNC: NEGATIVE — SIGNIFICANT CHANGE UP
BLOOD GAS VENOUS - CREATININE: 2.21 MG/DL — HIGH (ref 0.5–1.3)
BLOOD UR QL VISUAL: SIGNIFICANT CHANGE UP
BUN SERPL-MCNC: 23 MG/DL — SIGNIFICANT CHANGE UP (ref 7–23)
CALCIUM SERPL-MCNC: 8.9 MG/DL — SIGNIFICANT CHANGE UP (ref 8.4–10.5)
CHLORIDE BLDV-SCNC: 102 MMOL/L — SIGNIFICANT CHANGE UP (ref 96–108)
CHLORIDE SERPL-SCNC: 99 MMOL/L — SIGNIFICANT CHANGE UP (ref 98–107)
CO2 SERPL-SCNC: 22 MMOL/L — SIGNIFICANT CHANGE UP (ref 22–31)
COLOR SPEC: SIGNIFICANT CHANGE UP
CREAT SERPL-MCNC: 2.03 MG/DL — HIGH (ref 0.5–1.3)
EOSINOPHIL # BLD AUTO: 0.02 K/UL — SIGNIFICANT CHANGE UP (ref 0–0.5)
EOSINOPHIL NFR BLD AUTO: 0.3 % — SIGNIFICANT CHANGE UP (ref 0–6)
GAS PNL BLDV: 137 MMOL/L — SIGNIFICANT CHANGE UP (ref 136–146)
GLUCOSE BLDV-MCNC: 257 MG/DL — HIGH (ref 70–99)
GLUCOSE SERPL-MCNC: 247 MG/DL — HIGH (ref 70–99)
GLUCOSE UR-MCNC: NEGATIVE — SIGNIFICANT CHANGE UP
HCO3 BLDV-SCNC: 24 MMOL/L — SIGNIFICANT CHANGE UP (ref 20–27)
HCT VFR BLD CALC: 36.9 % — LOW (ref 39–50)
HCT VFR BLDV CALC: 40.8 % — SIGNIFICANT CHANGE UP (ref 39–51)
HGB BLD-MCNC: 12.7 G/DL — LOW (ref 13–17)
HGB BLDV-MCNC: 13.3 G/DL — SIGNIFICANT CHANGE UP (ref 13–17)
HYALINE CASTS # UR AUTO: SIGNIFICANT CHANGE UP
IMM GRANULOCYTES NFR BLD AUTO: 0.2 % — SIGNIFICANT CHANGE UP (ref 0–1.5)
KETONES UR-MCNC: NEGATIVE — SIGNIFICANT CHANGE UP
LACTATE BLDV-MCNC: 2 MMOL/L — SIGNIFICANT CHANGE UP (ref 0.5–2)
LEUKOCYTE ESTERASE UR-ACNC: NEGATIVE — SIGNIFICANT CHANGE UP
LYMPHOCYTES # BLD AUTO: 1.7 K/UL — SIGNIFICANT CHANGE UP (ref 1–3.3)
LYMPHOCYTES # BLD AUTO: 27.6 % — SIGNIFICANT CHANGE UP (ref 13–44)
MCHC RBC-ENTMCNC: 29.4 PG — SIGNIFICANT CHANGE UP (ref 27–34)
MCHC RBC-ENTMCNC: 34.4 % — SIGNIFICANT CHANGE UP (ref 32–36)
MCV RBC AUTO: 85.4 FL — SIGNIFICANT CHANGE UP (ref 80–100)
MONOCYTES # BLD AUTO: 1.09 K/UL — HIGH (ref 0–0.9)
MONOCYTES NFR BLD AUTO: 17.7 % — HIGH (ref 2–14)
NEUTROPHILS # BLD AUTO: 3.32 K/UL — SIGNIFICANT CHANGE UP (ref 1.8–7.4)
NEUTROPHILS NFR BLD AUTO: 53.9 % — SIGNIFICANT CHANGE UP (ref 43–77)
NITRITE UR-MCNC: NEGATIVE — SIGNIFICANT CHANGE UP
NRBC # FLD: 0 K/UL — SIGNIFICANT CHANGE UP (ref 0–0)
PCO2 BLDV: 43 MMHG — SIGNIFICANT CHANGE UP (ref 41–51)
PH BLDV: 7.37 PH — SIGNIFICANT CHANGE UP (ref 7.32–7.43)
PH UR: 6.5 — SIGNIFICANT CHANGE UP (ref 5–8)
PLATELET # BLD AUTO: 187 K/UL — SIGNIFICANT CHANGE UP (ref 150–400)
PMV BLD: 12.1 FL — SIGNIFICANT CHANGE UP (ref 7–13)
PO2 BLDV: 50 MMHG — HIGH (ref 35–40)
POTASSIUM BLDV-SCNC: 3.4 MMOL/L — SIGNIFICANT CHANGE UP (ref 3.4–4.5)
POTASSIUM SERPL-MCNC: 3.5 MMOL/L — SIGNIFICANT CHANGE UP (ref 3.5–5.3)
POTASSIUM SERPL-SCNC: 3.5 MMOL/L — SIGNIFICANT CHANGE UP (ref 3.5–5.3)
PROT SERPL-MCNC: 7.2 G/DL — SIGNIFICANT CHANGE UP (ref 6–8.3)
PROT UR-MCNC: 200 — HIGH
RBC # BLD: 4.32 M/UL — SIGNIFICANT CHANGE UP (ref 4.2–5.8)
RBC # FLD: 13.3 % — SIGNIFICANT CHANGE UP (ref 10.3–14.5)
RBC CASTS # UR COMP ASSIST: HIGH (ref 0–?)
SAO2 % BLDV: 83.3 % — SIGNIFICANT CHANGE UP (ref 60–85)
SARS-COV-2 RNA SPEC QL NAA+PROBE: DETECTED
SODIUM SERPL-SCNC: 136 MMOL/L — SIGNIFICANT CHANGE UP (ref 135–145)
SP GR SPEC: 1.02 — SIGNIFICANT CHANGE UP (ref 1–1.04)
SQUAMOUS # UR AUTO: SIGNIFICANT CHANGE UP
UROBILINOGEN FLD QL: NORMAL — SIGNIFICANT CHANGE UP
WBC # BLD: 6.16 K/UL — SIGNIFICANT CHANGE UP (ref 3.8–10.5)
WBC # FLD AUTO: 6.16 K/UL — SIGNIFICANT CHANGE UP (ref 3.8–10.5)
WBC UR QL: SIGNIFICANT CHANGE UP (ref 0–?)

## 2020-08-14 PROCEDURE — 74176 CT ABD & PELVIS W/O CONTRAST: CPT | Mod: 26

## 2020-08-14 PROCEDURE — 71045 X-RAY EXAM CHEST 1 VIEW: CPT | Mod: 26

## 2020-08-14 PROCEDURE — 99284 EMERGENCY DEPT VISIT MOD MDM: CPT

## 2020-08-14 RX ORDER — ACETAMINOPHEN 500 MG
975 TABLET ORAL ONCE
Refills: 0 | Status: COMPLETED | OUTPATIENT
Start: 2020-08-14 | End: 2020-08-14

## 2020-08-14 RX ORDER — CEFEPIME 1 G/1
2000 INJECTION, POWDER, FOR SOLUTION INTRAMUSCULAR; INTRAVENOUS ONCE
Refills: 0 | Status: COMPLETED | OUTPATIENT
Start: 2020-08-14 | End: 2020-08-14

## 2020-08-14 RX ORDER — SODIUM CHLORIDE 9 MG/ML
1000 INJECTION INTRAMUSCULAR; INTRAVENOUS; SUBCUTANEOUS ONCE
Refills: 0 | Status: COMPLETED | OUTPATIENT
Start: 2020-08-14 | End: 2020-08-14

## 2020-08-14 RX ADMIN — Medication 975 MILLIGRAM(S): at 14:55

## 2020-08-14 RX ADMIN — CEFEPIME 100 MILLIGRAM(S): 1 INJECTION, POWDER, FOR SOLUTION INTRAMUSCULAR; INTRAVENOUS at 15:15

## 2020-08-14 RX ADMIN — SODIUM CHLORIDE 1000 MILLILITER(S): 9 INJECTION INTRAMUSCULAR; INTRAVENOUS; SUBCUTANEOUS at 14:55

## 2020-08-14 NOTE — ED PROVIDER NOTE - OBJECTIVE STATEMENT
Pt is a 68 y/o M PMHx HTN, DM p/w fever, cough and urinary urgency x 3 days.  Pt states he developed fevers, Tmax 102 F associated with dry cough and urinary urgency and frequency.  He states he feels he is able to void until his bladder empties.  He notes associated intermittent mild nonradiating LLQ pain, sharp in quality.  He states he had identical symptoms a few years ago when he was found to have UTI and bacteremia.  Pt notes recent travel to [place] from [facility] a few days ago.  He denies any chest pain, SOB, nausea, vomiting, sore throat, dysuria, cloudy urine, hematuria, diarrhea, constipation, hemoptysis, weight loss, known sick contacts, ETOH abuse.

## 2020-08-14 NOTE — ED PROVIDER NOTE - CARE PLAN
Principal Discharge DX:	Lower urinary tract infection  Secondary Diagnosis:	DM (diabetes mellitus)  Secondary Diagnosis:	Hypertension, unspecified type Principal Discharge DX:	Cough  Secondary Diagnosis:	DM (diabetes mellitus)  Secondary Diagnosis:	Hypertension, unspecified type

## 2020-08-14 NOTE — ED ADULT NURSE NOTE - CHIEF COMPLAINT QUOTE
pt amb to triage c/o fever/cough, as per son Jerry [phone], traveled from [facility] 3 days ago, developed fever/cough 2 days ago, increased urination, as per son called PMD advised to come to Ed for further eval, charge aware, brought to intake airborne room

## 2020-08-14 NOTE — ED PROVIDER NOTE - PATIENT PORTAL LINK FT
You can access the FollowMyHealth Patient Portal offered by [facility] by registering at the following website: [email or web address]. By joining [facility] [employer]’s FollowMyHealth portal, you will also be able to view your health information using other applications (apps) compatible with our system.

## 2020-08-14 NOTE — ED ADULT NURSE NOTE - OBJECTIVE STATEMENT
pt coming with subjective fever of 101.5 F yest. with some non prod. cough, denies SOB, no dizziness, Rectal Temp. 98.9 F.  pt ambulatory to ER, [place], no cough note at present time. labs done, IV to right ventral arm 20g angio cath place, IV fluids in progress, medicated as ordered.   Pending dispo.

## 2020-08-14 NOTE — ED PROVIDER NOTE - NONTENDER LOCATION
left lower quadrant/right lower quadrant/right costovertebral angle/suprapubic/periumbilical/left costovertebral angle/left upper quadrant/right upper quadrant/umbilical

## 2020-08-14 NOTE — ED ADULT TRIAGE NOTE - CHIEF COMPLAINT QUOTE
pt amb to triage c/o fever/cough, as per son Jerry [phone], traveled from [facility] 3 days ago, developed fever/cough 2 days ago, increased urination, as per son called PMD advised to come to Ed for further eval, charge aware, brought to intake airborne room

## 2020-08-14 NOTE — ED PROVIDER NOTE - CLINICAL SUMMARY MEDICAL DECISION MAKING FREE TEXT BOX
Pt is a 68 y/o M PMHx HTN, DM p/w fever, cough and urinary urgency x 3 days. -- possible UTI, possible URI, r/o pneumonia, given LLQ w/o tenderness, possible renal stone -- labs, blood cultures, ua, ucx, cxr, ct abd and pelvis

## 2020-08-14 NOTE — ED PROVIDER NOTE - NSFOLLOWUPINSTRUCTIONS_ED_ALL_ED_FT
Advance activity as tolerated.  Continue all previously prescribed medications as directed unless otherwise instructed.  Take Tylenol 650mg (Two 325 mg pills) every 4-6 hours as needed for pain or fevers.  Follow up with your primary care physician and nephrologist in 48-72 hours- bring copies of your results.  Return to the ER for worsening or persistent symptoms, including but not limited to worsening/persistent fevers, cough, chest pain, shortness of breath, abdominal pain, difficulty urinating, lightheadedness, weakness, and/or ANY NEW OR CONCERNING SYMPTOMS. If you have issues obtaining follow up, please call: [phone] to obtain a doctor or specialist who takes your insurance in your area.  You may call [phone] to make an appointment with the internal medicine clinic.

## 2020-08-14 NOTE — ED PROVIDER NOTE - PROGRESS NOTE DETAILS
AMANDA CHAN:  Pt notes feeling better. CT scan negative for acute pathology.  UA negative for UTI.  CXR clear.  Strict return precautions given.  Pt medically stable for discharge.  Reassessment performed and plan for discharge discussed with Dr. Hillman who agrees with disposition and discharge plan.

## 2020-08-15 LAB
CULTURE RESULTS: NO GROWTH — SIGNIFICANT CHANGE UP
SPECIMEN SOURCE: SIGNIFICANT CHANGE UP

## 2020-08-15 NOTE — ED POST DISCHARGE NOTE - ADDITIONAL DOCUMENTATION
Patient called to find out about his covid test result- was found to be positive.  Spoke to son and informed of results, advised to stay quarantined for 14 days and to return to ED for any difficulty breathing, chest pain.  Patient states is still having burning with urination, urine culture results still pending informed will call if results are positive.

## 2020-08-19 LAB
CULTURE RESULTS: SIGNIFICANT CHANGE UP
CULTURE RESULTS: SIGNIFICANT CHANGE UP
SPECIMEN SOURCE: SIGNIFICANT CHANGE UP
SPECIMEN SOURCE: SIGNIFICANT CHANGE UP

## 2021-03-04 NOTE — ED PROVIDER NOTE - CADM POA URETHRAL CATHETER
Pt called back stating that he check with his wife in regards to the Losartan 100mg (take 1/2 tablet twice daily) and he said that she had agreed that was the correct med  Can you please send med to the pharm  Also,  In amazing chart, patient was taking 50mg back on 02/27/2018 but then you switch him to the 100mg on 09/26/2018  No

## 2022-03-11 NOTE — ED PROVIDER NOTE - CARDIOVASCULAR NEGATIVE STATEMENT, MLM
Impression: Regular astigmatism, bilateral: H52.223. Plan: Discussed diagnosis in detail with patient. New glasses Rx was given today. Recommend yearly exams.
normal rate and rhythm, no chest pain and no edema.

## 2022-08-10 ENCOUNTER — INPATIENT (INPATIENT)
Facility: HOSPITAL | Age: 72
LOS: 6 days | Discharge: HOME | End: 2022-08-17
Attending: INTERNAL MEDICINE | Admitting: INTERNAL MEDICINE
Payer: MEDICARE

## 2022-08-10 VITALS
HEART RATE: 65 BPM | SYSTOLIC BLOOD PRESSURE: 143 MMHG | TEMPERATURE: 98 F | OXYGEN SATURATION: 99 % | RESPIRATION RATE: 18 BRPM | DIASTOLIC BLOOD PRESSURE: 72 MMHG | HEIGHT: 64 IN

## 2022-08-10 DIAGNOSIS — Z29.9 ENCOUNTER FOR PROPHYLACTIC MEASURES, UNSPECIFIED: ICD-10-CM

## 2022-08-10 DIAGNOSIS — R79.89 OTHER SPECIFIED ABNORMAL FINDINGS OF BLOOD CHEMISTRY: ICD-10-CM

## 2022-08-10 DIAGNOSIS — I10 ESSENTIAL (PRIMARY) HYPERTENSION: ICD-10-CM

## 2022-08-10 DIAGNOSIS — R55 SYNCOPE AND COLLAPSE: ICD-10-CM

## 2022-08-10 DIAGNOSIS — E11.9 TYPE 2 DIABETES MELLITUS WITHOUT COMPLICATIONS: ICD-10-CM

## 2022-08-10 DIAGNOSIS — E87.6 HYPOKALEMIA: ICD-10-CM

## 2022-08-10 DIAGNOSIS — D64.9 ANEMIA, UNSPECIFIED: ICD-10-CM

## 2022-08-10 DIAGNOSIS — N18.4 CHRONIC KIDNEY DISEASE, STAGE 4 (SEVERE): ICD-10-CM

## 2022-08-10 LAB
ALBUMIN SERPL ELPH-MCNC: 3.8 G/DL — SIGNIFICANT CHANGE UP (ref 3.3–5)
ALP SERPL-CCNC: 82 U/L — SIGNIFICANT CHANGE UP (ref 40–120)
ALT FLD-CCNC: 15 U/L — SIGNIFICANT CHANGE UP (ref 4–41)
ANION GAP SERPL CALC-SCNC: 11 MMOL/L — SIGNIFICANT CHANGE UP (ref 7–14)
ANION GAP SERPL CALC-SCNC: 12 MMOL/L — SIGNIFICANT CHANGE UP (ref 7–14)
APPEARANCE UR: CLEAR — SIGNIFICANT CHANGE UP
AST SERPL-CCNC: 22 U/L — SIGNIFICANT CHANGE UP (ref 4–40)
BACTERIA # UR AUTO: NEGATIVE — SIGNIFICANT CHANGE UP
BASOPHILS # BLD AUTO: 0.02 K/UL — SIGNIFICANT CHANGE UP (ref 0–0.2)
BASOPHILS NFR BLD AUTO: 0.3 % — SIGNIFICANT CHANGE UP (ref 0–2)
BILIRUB SERPL-MCNC: 0.4 MG/DL — SIGNIFICANT CHANGE UP (ref 0.2–1.2)
BILIRUB UR-MCNC: NEGATIVE — SIGNIFICANT CHANGE UP
BUN SERPL-MCNC: 36 MG/DL — HIGH (ref 7–23)
BUN SERPL-MCNC: 46 MG/DL — HIGH (ref 7–23)
CALCIUM SERPL-MCNC: 9.7 MG/DL — SIGNIFICANT CHANGE UP (ref 8.4–10.5)
CALCIUM SERPL-MCNC: 9.9 MG/DL — SIGNIFICANT CHANGE UP (ref 8.4–10.5)
CHLORIDE SERPL-SCNC: 104 MMOL/L — SIGNIFICANT CHANGE UP (ref 98–107)
CHLORIDE SERPL-SCNC: 106 MMOL/L — SIGNIFICANT CHANGE UP (ref 98–107)
CK MB BLD-MCNC: 2.4 % — SIGNIFICANT CHANGE UP (ref 0–2.5)
CK MB CFR SERPL CALC: 1.5 NG/ML — SIGNIFICANT CHANGE UP
CK SERPL-CCNC: 62 U/L — SIGNIFICANT CHANGE UP (ref 30–200)
CO2 SERPL-SCNC: 23 MMOL/L — SIGNIFICANT CHANGE UP (ref 22–31)
CO2 SERPL-SCNC: 26 MMOL/L — SIGNIFICANT CHANGE UP (ref 22–31)
COLOR SPEC: SIGNIFICANT CHANGE UP
CREAT SERPL-MCNC: 2.8 MG/DL — HIGH (ref 0.5–1.3)
CREAT SERPL-MCNC: 2.95 MG/DL — HIGH (ref 0.5–1.3)
DIFF PNL FLD: NEGATIVE — SIGNIFICANT CHANGE UP
EGFR: 22 ML/MIN/1.73M2 — LOW
EGFR: 23 ML/MIN/1.73M2 — LOW
EOSINOPHIL # BLD AUTO: 0.23 K/UL — SIGNIFICANT CHANGE UP (ref 0–0.5)
EOSINOPHIL NFR BLD AUTO: 2.9 % — SIGNIFICANT CHANGE UP (ref 0–6)
EPI CELLS # UR: 0 /HPF — SIGNIFICANT CHANGE UP (ref 0–5)
FLUAV AG NPH QL: SIGNIFICANT CHANGE UP
FLUBV AG NPH QL: SIGNIFICANT CHANGE UP
GLUCOSE BLDC GLUCOMTR-MCNC: 119 MG/DL — HIGH (ref 70–99)
GLUCOSE BLDC GLUCOMTR-MCNC: 120 MG/DL — HIGH (ref 70–99)
GLUCOSE SERPL-MCNC: 133 MG/DL — HIGH (ref 70–99)
GLUCOSE SERPL-MCNC: 94 MG/DL — SIGNIFICANT CHANGE UP (ref 70–99)
GLUCOSE UR QL: ABNORMAL
HCT VFR BLD CALC: 35.6 % — LOW (ref 39–50)
HGB BLD-MCNC: 11.5 G/DL — LOW (ref 13–17)
HYALINE CASTS # UR AUTO: 2 /LPF — SIGNIFICANT CHANGE UP (ref 0–7)
IANC: 5.23 K/UL — SIGNIFICANT CHANGE UP (ref 1.8–7.4)
IMM GRANULOCYTES NFR BLD AUTO: 0.1 % — SIGNIFICANT CHANGE UP (ref 0–1.5)
KETONES UR-MCNC: NEGATIVE — SIGNIFICANT CHANGE UP
LEUKOCYTE ESTERASE UR-ACNC: NEGATIVE — SIGNIFICANT CHANGE UP
LIDOCAIN IGE QN: 77 U/L — HIGH (ref 7–60)
LYMPHOCYTES # BLD AUTO: 1.78 K/UL — SIGNIFICANT CHANGE UP (ref 1–3.3)
LYMPHOCYTES # BLD AUTO: 22.3 % — SIGNIFICANT CHANGE UP (ref 13–44)
MAGNESIUM SERPL-MCNC: 2.1 MG/DL — SIGNIFICANT CHANGE UP (ref 1.6–2.6)
MAGNESIUM SERPL-MCNC: 2.2 MG/DL — SIGNIFICANT CHANGE UP (ref 1.6–2.6)
MCHC RBC-ENTMCNC: 27.9 PG — SIGNIFICANT CHANGE UP (ref 27–34)
MCHC RBC-ENTMCNC: 32.3 GM/DL — SIGNIFICANT CHANGE UP (ref 32–36)
MCV RBC AUTO: 86.4 FL — SIGNIFICANT CHANGE UP (ref 80–100)
MONOCYTES # BLD AUTO: 0.73 K/UL — SIGNIFICANT CHANGE UP (ref 0–0.9)
MONOCYTES NFR BLD AUTO: 9.1 % — SIGNIFICANT CHANGE UP (ref 2–14)
NEUTROPHILS # BLD AUTO: 5.23 K/UL — SIGNIFICANT CHANGE UP (ref 1.8–7.4)
NEUTROPHILS NFR BLD AUTO: 65.3 % — SIGNIFICANT CHANGE UP (ref 43–77)
NITRITE UR-MCNC: NEGATIVE — SIGNIFICANT CHANGE UP
NRBC # BLD: 0 /100 WBCS — SIGNIFICANT CHANGE UP
NRBC # FLD: 0 K/UL — SIGNIFICANT CHANGE UP
NT-PROBNP SERPL-SCNC: 715 PG/ML — HIGH
PH UR: 6 — SIGNIFICANT CHANGE UP (ref 5–8)
PHOSPHATE SERPL-MCNC: 3.7 MG/DL — SIGNIFICANT CHANGE UP (ref 2.5–4.5)
PLATELET # BLD AUTO: 188 K/UL — SIGNIFICANT CHANGE UP (ref 150–400)
POTASSIUM SERPL-MCNC: 3.4 MMOL/L — LOW (ref 3.5–5.3)
POTASSIUM SERPL-MCNC: 3.8 MMOL/L — SIGNIFICANT CHANGE UP (ref 3.5–5.3)
POTASSIUM SERPL-SCNC: 3.4 MMOL/L — LOW (ref 3.5–5.3)
POTASSIUM SERPL-SCNC: 3.8 MMOL/L — SIGNIFICANT CHANGE UP (ref 3.5–5.3)
PROT SERPL-MCNC: 6.9 G/DL — SIGNIFICANT CHANGE UP (ref 6–8.3)
PROT UR-MCNC: ABNORMAL
RBC # BLD: 4.12 M/UL — LOW (ref 4.2–5.8)
RBC # FLD: 14.3 % — SIGNIFICANT CHANGE UP (ref 10.3–14.5)
RBC CASTS # UR COMP ASSIST: 0 /HPF — SIGNIFICANT CHANGE UP (ref 0–4)
RSV RNA NPH QL NAA+NON-PROBE: SIGNIFICANT CHANGE UP
SARS-COV-2 RNA SPEC QL NAA+PROBE: SIGNIFICANT CHANGE UP
SODIUM SERPL-SCNC: 141 MMOL/L — SIGNIFICANT CHANGE UP (ref 135–145)
SODIUM SERPL-SCNC: 141 MMOL/L — SIGNIFICANT CHANGE UP (ref 135–145)
SP GR SPEC: 1.01 — SIGNIFICANT CHANGE UP (ref 1–1.05)
TROPONIN T, HIGH SENSITIVITY RESULT: 28 NG/L — SIGNIFICANT CHANGE UP
TROPONIN T, HIGH SENSITIVITY RESULT: 31 NG/L — SIGNIFICANT CHANGE UP
UROBILINOGEN FLD QL: SIGNIFICANT CHANGE UP
WBC # BLD: 8 K/UL — SIGNIFICANT CHANGE UP (ref 3.8–10.5)
WBC # FLD AUTO: 8 K/UL — SIGNIFICANT CHANGE UP (ref 3.8–10.5)
WBC UR QL: 0 /HPF — SIGNIFICANT CHANGE UP (ref 0–5)

## 2022-08-10 PROCEDURE — 93308 TTE F-UP OR LMTD: CPT | Mod: 26

## 2022-08-10 PROCEDURE — 99285 EMERGENCY DEPT VISIT HI MDM: CPT | Mod: 25,GC

## 2022-08-10 PROCEDURE — G1004: CPT

## 2022-08-10 PROCEDURE — 70450 CT HEAD/BRAIN W/O DYE: CPT | Mod: 26,MG

## 2022-08-10 PROCEDURE — 99223 1ST HOSP IP/OBS HIGH 75: CPT

## 2022-08-10 PROCEDURE — 71046 X-RAY EXAM CHEST 2 VIEWS: CPT | Mod: 26

## 2022-08-10 PROCEDURE — 93010 ELECTROCARDIOGRAM REPORT: CPT

## 2022-08-10 RX ORDER — INSULIN GLARGINE 100 [IU]/ML
9 INJECTION, SOLUTION SUBCUTANEOUS AT BEDTIME
Refills: 0 | Status: DISCONTINUED | OUTPATIENT
Start: 2022-08-10 | End: 2022-08-16

## 2022-08-10 RX ORDER — SODIUM BICARBONATE 1 MEQ/ML
650 SYRINGE (ML) INTRAVENOUS THREE TIMES A DAY
Refills: 0 | Status: DISCONTINUED | OUTPATIENT
Start: 2022-08-10 | End: 2022-08-17

## 2022-08-10 RX ORDER — AMLODIPINE BESYLATE 2.5 MG/1
10 TABLET ORAL DAILY
Refills: 0 | Status: DISCONTINUED | OUTPATIENT
Start: 2022-08-10 | End: 2022-08-17

## 2022-08-10 RX ORDER — DEXTROSE 50 % IN WATER 50 %
15 SYRINGE (ML) INTRAVENOUS ONCE
Refills: 0 | Status: DISCONTINUED | OUTPATIENT
Start: 2022-08-10 | End: 2022-08-17

## 2022-08-10 RX ORDER — DEXTROSE 50 % IN WATER 50 %
12.5 SYRINGE (ML) INTRAVENOUS ONCE
Refills: 0 | Status: DISCONTINUED | OUTPATIENT
Start: 2022-08-10 | End: 2022-08-17

## 2022-08-10 RX ORDER — DEXTROSE 50 % IN WATER 50 %
25 SYRINGE (ML) INTRAVENOUS ONCE
Refills: 0 | Status: DISCONTINUED | OUTPATIENT
Start: 2022-08-10 | End: 2022-08-17

## 2022-08-10 RX ORDER — SITAGLIPTIN 50 MG/1
1 TABLET, FILM COATED ORAL
Qty: 0 | Refills: 0 | DISCHARGE

## 2022-08-10 RX ORDER — SODIUM CHLORIDE 9 MG/ML
1000 INJECTION, SOLUTION INTRAVENOUS
Refills: 0 | Status: DISCONTINUED | OUTPATIENT
Start: 2022-08-10 | End: 2022-08-17

## 2022-08-10 RX ORDER — ENOXAPARIN SODIUM 100 MG/ML
30 INJECTION SUBCUTANEOUS EVERY 24 HOURS
Refills: 0 | Status: DISCONTINUED | OUTPATIENT
Start: 2022-08-10 | End: 2022-08-10

## 2022-08-10 RX ORDER — INSULIN LISPRO 100/ML
3 VIAL (ML) SUBCUTANEOUS
Refills: 0 | Status: DISCONTINUED | OUTPATIENT
Start: 2022-08-10 | End: 2022-08-17

## 2022-08-10 RX ORDER — TAMSULOSIN HYDROCHLORIDE 0.4 MG/1
0.4 CAPSULE ORAL AT BEDTIME
Refills: 0 | Status: DISCONTINUED | OUTPATIENT
Start: 2022-08-10 | End: 2022-08-17

## 2022-08-10 RX ORDER — ASPIRIN/CALCIUM CARB/MAGNESIUM 324 MG
81 TABLET ORAL DAILY
Refills: 0 | Status: DISCONTINUED | OUTPATIENT
Start: 2022-08-10 | End: 2022-08-17

## 2022-08-10 RX ORDER — METOPROLOL TARTRATE 50 MG
12.5 TABLET ORAL
Refills: 0 | Status: DISCONTINUED | OUTPATIENT
Start: 2022-08-10 | End: 2022-08-17

## 2022-08-10 RX ORDER — HEPARIN SODIUM 5000 [USP'U]/ML
5000 INJECTION INTRAVENOUS; SUBCUTANEOUS EVERY 12 HOURS
Refills: 0 | Status: DISCONTINUED | OUTPATIENT
Start: 2022-08-10 | End: 2022-08-17

## 2022-08-10 RX ORDER — CALCIUM ACETATE 667 MG
667 TABLET ORAL
Refills: 0 | Status: DISCONTINUED | OUTPATIENT
Start: 2022-08-10 | End: 2022-08-17

## 2022-08-10 RX ORDER — ATORVASTATIN CALCIUM 80 MG/1
20 TABLET, FILM COATED ORAL AT BEDTIME
Refills: 0 | Status: DISCONTINUED | OUTPATIENT
Start: 2022-08-10 | End: 2022-08-17

## 2022-08-10 RX ORDER — INSULIN LISPRO 100/ML
VIAL (ML) SUBCUTANEOUS
Refills: 0 | Status: DISCONTINUED | OUTPATIENT
Start: 2022-08-10 | End: 2022-08-17

## 2022-08-10 RX ORDER — GLUCAGON INJECTION, SOLUTION 0.5 MG/.1ML
1 INJECTION, SOLUTION SUBCUTANEOUS ONCE
Refills: 0 | Status: DISCONTINUED | OUTPATIENT
Start: 2022-08-10 | End: 2022-08-17

## 2022-08-10 RX ADMIN — AMLODIPINE BESYLATE 10 MILLIGRAM(S): 2.5 TABLET ORAL at 17:59

## 2022-08-10 RX ADMIN — Medication 667 MILLIGRAM(S): at 17:59

## 2022-08-10 RX ADMIN — Medication 12.5 MILLIGRAM(S): at 17:59

## 2022-08-10 NOTE — ED PROVIDER NOTE - NS ED ROS FT
Constitutional: intermittent lightheadedness, syncope, no fevers, no chills.  Eyes: no visual changes.  Ears: no ear drainage, no ear pain.  Nose: no nasal congestion.  Mouth/Throat: no sore throat.  Cardiovascular: no chest pain.  Respiratory: no shortness of breath, no wheezing, no cough  Gastrointestinal: no nausea, vomiting, diarrhea or abdominal pain.  MSK: no flank pain, no back pain.  Genitourinary: no dysuria, no hematuria.  Skin: no rashes.  Neuro: no headache, normal gait.  Psychiatric: no known mental health issues.

## 2022-08-10 NOTE — H&P ADULT - NSHPPHYSICALEXAM_GEN_ALL_CORE
PHYSICAL EXAM:  Vital Signs Last 24 Hrs  T(C): 36.6 (10 Aug 2022 16:03), Max: 36.8 (10 Aug 2022 09:00)  T(F): 97.9 (10 Aug 2022 16:03), Max: 98.2 (10 Aug 2022 09:00)  HR: 64 (10 Aug 2022 16:03) (55 - 65)  BP: 128/63 (10 Aug 2022 16:03) (128/63 - 144/85)  BP(mean): --  RR: 18 (10 Aug 2022 16:03) (18 - 18)  SpO2: 100% (10 Aug 2022 16:03) (99% - 100%)    Parameters below as of 10 Aug 2022 16:03  Patient On (Oxygen Delivery Method): room air      CONSTITUTIONAL: NAD, well-developed, well-groomed  EYES: PERRLA; conjunctiva and sclera clear  ENMT: Moist oral mucosa, no pharyngeal injection or exudates; normal dentition  NECK: Supple, no palpable masses; no thyromegaly  RESPIRATORY: Normal respiratory effort; lungs are clear to auscultation bilaterally  CARDIOVASCULAR: Regular rate and rhythm, normal S1 and S2, no murmur/rub/gallop; No lower extremity edema; Peripheral pulses are 2+ bilaterally  ABDOMEN: Nontender to palpation, normoactive bowel sounds, no rebound/guarding; No hepatosplenomegaly  MUSCULOSKELETAL:  Normal gait; no clubbing or cyanosis of digits; no joint swelling or tenderness to palpation  PSYCH: A+O to person, place, and time; affect appropriate  NEUROLOGY: CN 2-12 are intact and symmetric; no gross sensory deficits   SKIN: No rashes; no palpable lesions

## 2022-08-10 NOTE — H&P ADULT - PROBLEM SELECTOR PLAN 5
- Cr 2.8 on presentation – prior known baseline 2.4-2.5 outpatient  - Progression vs JUNI  - Check urine Cr, Na, osm  - Trend BMP  - Continue home calcium acetate, sodium bicarbonate tabs  - Nephrology following

## 2022-08-10 NOTE — ED ADULT TRIAGE NOTE - CHIEF COMPLAINT QUOTE
Pt states last week Wednesday with episode of urinary incontinence.  pt co dizziness. Pt sent in by his PMD for evaluation. Pt denies chest pain.  FIS 92. PT is NICHO.

## 2022-08-10 NOTE — ED ADULT NURSE NOTE - OBJECTIVE STATEMENT
70y/o male A&ox4, ambulatory received in trA. pt c/o syncopal episode last wednesday. c/o sob/dizziness with ambulation. ckd, htn, hld, dm on insulin. denies cp, sob at rest, dizziness, lightheadedness, n/v, head trauma, AC use. pt able to follow commands, responding to questions appropriately. respirations even and unlabored, completing full sentences. strength/sensation equal b/l upper and lower extremities. sinus mary with PVCs on cardiac monitor. vs as noted in flowsheet. 18g iv placed in LAC, labs drawn and sent. md at bedside for eval. bed in lowest position, side rails up, call bell in hand, safety maintained. awaiting further orders. will continue to monitor.

## 2022-08-10 NOTE — CONSULT NOTE ADULT - SUBJECTIVE AND OBJECTIVE BOX
[facility]   MD VELASQUEZ PERDOMO MD KRISTINE SOLTANPOUR, DO ANGELA WONG, PA        TEL:  OFFICE: [phone]  From 5pm-7am answering service [phone]    --- INITIAL RENAL CONSULT NOTE ---date of service 08-10-22 @ 15:10    HPI:  71M w/hx CKD, HTN, HLD, DM, referred to the ED by his PCP (Dr. Steffen Rene) for recurrent syncope over the last week. He reports multiple episodes of feeling lightheaded with vision darkening that then resolves spontaneously. He does not fully recall the events surrounding the episodes but endorses chest pressure during the episodes. He denies any falls, head injuries, accidents or other traumas, nausea, vomiting, fevers, or chills.   pt follows up with dr grace. last visit in  noted with worsen renal function. baseline ~ 2.4, now 2.8      Allergies:  No Known Allergies      PAST MEDICAL & SURGICAL HISTORY:  DM (diabetes mellitus)      HTN (hypertension)      No significant past surgical history          Home Medications Reviewed    Hospital Medications:   MEDICATIONS  (STANDING):      SOCIAL HISTORY:  Denies ETOh, Smoking,     FAMILY HISTORY:  No pertinent family history in first degree relatives        REVIEW OF SYSTEMS:  CONSTITUTIONAL: No weakness, fevers or chills  EYES/ENT: No visual changes;  No vertigo or throat pain   NECK: No pain or stiffness  RESPIRATORY: No cough, wheezing, hemoptysis; No shortness of breath  CARDIOVASCULAR: No chest pain or palpitations.  GASTROINTESTINAL: No abdominal or epigastric pain. No nausea, vomiting, or hematemesis; No diarrhea or constipation. No melena or hematochezia.  GENITOURINARY: No dysuria, frequency, foamy urine, urinary urgency, incontinence or hematuria  NEUROLOGICAL: see hpi  SKIN: No itching, burning, rashes, or lesions   VASCULAR: No bilateral lower extremity edema.   All other review of systems is negative unless indicated above.    VITALS:  T(F): 98.2 (08-10-22 @ 09:00), Max: 98.2 (08-10-22 @ 09:00)  HR: 55 (08-10-22 @ 10:12)  BP: 144/85 (08-10-22 @ 10:12)  RR: 18 (08-10-22 @ 10:12)  SpO2: 100% (08-10-22 @ 10:12)  Wt(kg): --    Height (cm): 162.6 (08-10 @ 09:00)    PHYSICAL EXAM:  General: NAD  HEENT: anicteric sclera, oropharynx clear, MMM  Neck: No JVD  Respiratory: CTAB, no wheezes, rales or rhonchi  Cardiovascular: S1, S2, RRR  Gastrointestinal: BS+, soft, NT/ND  Extremities: No cyanosis or clubbing. No peripheral edema  Neurological: A/O x 3, no focal deficits  Psychiatric: Normal mood, normal affect  : No CVA tenderness. No lemus.   Skin: No rashes      LABS:  08-10    141  |  106  |  36<H>  ----------------------------<  94  3.4<L>   |  23  |  2.80<H>    Ca    9.7      10 Aug 2022 09:55  Mg     2.10     08-10    TPro  6.9  /  Alb  3.8  /  TBili  0.4  /  DBili      /  AST  22  /  ALT  15  /  AlkPhos  82  08-10    Creatinine Trend: 2.80 <--                        11.5   8.00  )-----------( 188      ( 10 Aug 2022 09:55 )             35.6     Urine Studies:  Urinalysis Basic - ( 10 Aug 2022 14:10 )    Color: Light Yellow / Appearance: Clear / S.015 / pH:   Gluc:  / Ketone: Negative  / Bili: Negative / Urobili: <2 mg/dL   Blood:  / Protein: 100 mg/dL / Nitrite: Negative   Leuk Esterase: Negative / RBC: 0 /HPF / WBC 0 /HPF   Sq Epi:  / Non Sq Epi: 0 /HPF / Bacteria: Negative          RADIOLOGY & ADDITIONAL STUDIES:                 [facility]   MD VELASQUEZ PERDOMO MD KRISTINE SOLTANPOUR, DO ANGELA WONG, PA        TEL:  OFFICE: [phone]  From 5pm-7am answering service [phone]    --- INITIAL RENAL CONSULT NOTE ---date of service 08-10-22 @ 15:10    HPI:  71M w/hx CKD, HTN, HLD, DM, referred to the ED by his PCP (Dr. Steffen Rene) for recurrent syncope over the last week. He reports multiple episodes of feeling lightheaded with vision darkening that then resolves spontaneously. He does not fully recall the events surrounding the episodes but endorses chest pressure during the episodes. He denies any falls, head injuries, accidents or other traumas, nausea, vomiting, fevers, or chills. Pt follows up with Dr. Virgen his nephrologist. Last visit was in  noted with worsen renal function. Baseline Scr was ~ 2.4, now 2.8, Nephrology consulted for JUNI.       Allergies:  No Known Allergies      PAST MEDICAL & SURGICAL HISTORY:  DM (diabetes mellitus)  HTN (hypertension)  No significant past surgical history      Home Medications:  amLODIPine 5 mg oral tablet: 1 tab(s) orally 2 times a day (10 Aug 2022 16:22)  aspirin 81 mg oral tablet: 1 tab(s) orally once a day (05 Oct 2017 23:04)  atorvastatin 20 mg oral tablet: 1 tab(s) orally once a day (10 Aug 2022 16:22)  calcium acetate 667 mg oral capsule: 1 cap(s) orally 3 times a day (with meals) (10 Aug 2022 16:23)  famotidine 40 mg oral tablet: 1 tab(s) orally once a day (at bedtime) (10 Aug 2022 16:23)  insulin detemir 100 units/mL subcutaneous solution: 15 unit(s) subcutaneous once a day (10 Aug 2022 16:22)  metoprolol succinate 25 mg oral tablet, extended release: 1 tab(s) orally once a day (10 Aug 2022 16:21)  tamsulosin 0.4 mg oral capsule: 1 cap(s) orally once a day (05 Oct 2017 23:04)  Vitamin D3: 1 tab(s) orally once a day (05 Oct 2017 23:04)    Home Medications Reviewed    Hospital Medications:   MEDICATIONS  (STANDING):      SOCIAL HISTORY:  Denies ETOh, Smoking,     FAMILY HISTORY:  No pertinent family history in first degree relatives        REVIEW OF SYSTEMS:  CONSTITUTIONAL: No weakness, fevers or chills  EYES/ENT: No visual changes;  No vertigo or throat pain   NECK: No pain or stiffness  RESPIRATORY: No cough, wheezing, hemoptysis; No shortness of breath  CARDIOVASCULAR: No chest pain or palpitations.  GASTROINTESTINAL: No abdominal or epigastric pain. No nausea, vomiting, or hematemesis; No diarrhea or constipation. No melena or hematochezia.  GENITOURINARY: No dysuria, frequency, foamy urine, urinary urgency, incontinence or hematuria  NEUROLOGICAL: see hpi  SKIN: No itching, burning, rashes, or lesions   VASCULAR: No bilateral lower extremity edema.   All other review of systems is negative unless indicated above.    VITALS:  T(F): 98.2 (08-10-22 @ 09:00), Max: 98.2 (08-10-22 @ 09:00)  HR: 55 (08-10-22 @ 10:12)  BP: 144/85 (08-10-22 @ 10:12)  RR: 18 (08-10-22 @ 10:12)  SpO2: 100% (08-10-22 @ 10:12)  Wt(kg): --    Height (cm): 162.6 (08-10 @ 09:00)    PHYSICAL EXAM:  General: NAD  HEENT: anicteric sclera, oropharynx clear, MMM  Neck: No JVD  Respiratory: CTAB, no wheezes, rales or rhonchi  Cardiovascular: S1, S2, RRR  Gastrointestinal: BS+, soft, NT/ND  Extremities: No cyanosis or clubbing. No peripheral edema  Neurological: A/O x 3, no focal deficits  Psychiatric: Normal mood, normal affect  : No CVA tenderness. No lemus.   Skin: No rashes      LABS:  08-10    141  |  106  |  36<H>  ----------------------------<  94  3.4<L>   |  23  |  2.80<H>    Ca    9.7      10 Aug 2022 09:55  Mg     2.10     08-10    TPro  6.9  /  Alb  3.8  /  TBili  0.4  /  DBili      /  AST  22  /  ALT  15  /  AlkPhos  82  08-10    Creatinine Trend: 2.80 <--                        11.5   8.00  )-----------( 188      ( 10 Aug 2022 09:55 )             35.6     Urine Studies:  Urinalysis Basic - ( 10 Aug 2022 14:10 )    Color: Light Yellow / Appearance: Clear / S.015 / pH:   Gluc:  / Ketone: Negative  / Bili: Negative / Urobili: <2 mg/dL   Blood:  / Protein: 100 mg/dL / Nitrite: Negative   Leuk Esterase: Negative / RBC: 0 /HPF / WBC 0 /HPF   Sq Epi:  / Non Sq Epi: 0 /HPF / Bacteria: Negative          RADIOLOGY & ADDITIONAL STUDIES:  ACC: [id number] EXAM:  XR CHEST PA LAT 2V                          PROCEDURE DATE:  08/10/2022          INTERPRETATION:  CLINICAL INFORMATION: Chest pain    TIME OF EXAMINATION: August 10, 2022 at 10:14 AM    EXAM: PA and lateral chest    FINDINGS:  The lungs are clear. The heart is not enlarged and there are no effusions   or pneumothorax.    Bones show no acute pathology.        COMPARISON: 2020        IMPRESSION: Clear lungs.    --- End of Report ---            KYRIE HOLLINGSWORTH MD; Attending Radiologist  This document has been electronically signed. Aug 10 2022  1:38PM

## 2022-08-10 NOTE — ED PROVIDER NOTE - CLINICAL SUMMARY MEDICAL DECISION MAKING FREE TEXT BOX
71M w/hx HTN, HLD, CKD, DM sent by PCP for evaluation of recurrent syncope in the home over the last week, without focal neuro deficits at this time. Ddx ACS, arrhythmia, electrolyte derangement.     Plan:   Labs  EKG  CXR  COVID-19 swab    Dispo:   Admit

## 2022-08-10 NOTE — H&P ADULT - NSICDXPASTMEDICALHX_GEN_ALL_CORE_FT
PAST MEDICAL HISTORY:  DM (diabetes mellitus)     HTN (hypertension)     Stage 4 chronic kidney disease

## 2022-08-10 NOTE — H&P ADULT - PROBLEM SELECTOR PLAN 1
- Patient with 1 episode of sudden onset lightheadedness and dizziness, with b/l UE shaking, and followed by period of confusion; associated with urinary incontinence  - Most likely seizure activity – first occurrence  - CT head unremarkable  - Telemetry monitoring  - Check troponin  - ECG: sinus bradycardia, TWIs II, III, aVF, V5-V6  - Obtain TTE to evaluate for structural causes  - Obtain MRI seizure protocol  - Obtain video EEG  - Neuro checks q4h  - Potentially due to hypoglycemia – f/s qachs – though patient did not check f/s at the time of the event  - PT/OT - Patient with 1 episode of sudden onset lightheadedness and dizziness, with b/l UE shaking, and followed by period of confusion; associated with urinary incontinence  - Most likely seizure activity – first occurrence  - CT head unremarkable  - Telemetry monitoring  - Check troponin  - ECG: sinus bradycardia, TWIs II, III, aVF, V5-V6  - Obtain TTE to evaluate for structural causes  - Obtain MRI seizure protocol  - Obtain video EEG  - Neuro checks q4h  - Potentially due to hypoglycemia – f/s qachs – though patient did not check f/s at the time of the event  - PT/OT  - if work-up nonrevealing, neurology consult

## 2022-08-10 NOTE — CONSULT NOTE ADULT - NS ATTEND AMEND GEN_ALL_CORE FT
UA just shows proteinuria. Vasculitis workup done by Dr. Virgen was negative. Could be component of JUNI. Versus CKD.

## 2022-08-10 NOTE — H&P ADULT - NSHPREVIEWOFSYSTEMS_GEN_ALL_CORE
CONSTITUTIONAL: No fever, weight loss, or fatigue  EYES: No eye pain, visual disturbances, or discharge  ENMT:  No difficulty hearing, tinnitus, vertigo; No sinus or throat pain  NECK: No pain or stiffness  BREASTS: No pain, masses, or nipple discharge  RESPIRATORY: No cough, wheezing, chills or hemoptysis; No shortness of breath  CARDIOVASCULAR: No chest pain, palpitations, dizziness, or leg swelling  GASTROINTESTINAL: No abdominal or epigastric pain. No nausea, vomiting, or hematemesis; No diarrhea or constipation. No melena or hematochezia.  GENITOURINARY: No dysuria, frequency, hematuria; admitted to incontinence  NEUROLOGICAL: No headaches, memory loss, loss of strength, numbness; admitted to lightheadedness, syncope, bilateral upper extremity shaking, and period of confusion  SKIN: No itching, burning, rashes, or lesions   LYMPH NODES: No enlarged glands  ENDOCRINE: No heat or cold intolerance; No hair loss  MUSCULOSKELETAL: No joint pain or swelling; No muscle, back, or extremity pain  PSYCHIATRIC: No depression, anxiety, mood swings, or difficulty sleeping  HEME/LYMPH: No easy bruising, or bleeding gums  ALLERGY AND IMMUNOLOGIC: No hives or eczema

## 2022-08-10 NOTE — ED PROVIDER NOTE - PHYSICAL EXAMINATION
Gen: No acute distress  HEENT: NCAT, EOMI, PERRLA,  mucous membranes moist  CV: RRR, S1S2  Resp: CTAB  GI: Abdomen soft, NT, ND. No rebound, no guarding.  : No CVAT  Neuro: A&Ox3, no motor or sensory deficits, no focal deficits.   MSK: No gross abnormalities. No midline spinal tenderness.  Skin: Warm, dry intact. No rashes.

## 2022-08-10 NOTE — H&P ADULT - NSHPLABSRESULTS_GEN_ALL_CORE
[facility] Division of Hospital Medicine  Francis LeyvaDO  Available via MS Teams  In house pager [phone]    SUBJECTIVE / OVERNIGHT EVENTS:    ADDITIONAL REVIEW OF SYSTEMS:    MEDICATIONS  (STANDING):  dextrose 5%. 1000 milliLiter(s) (50 mL/Hr) IV Continuous <Continuous>  dextrose 5%. 1000 milliLiter(s) (100 mL/Hr) IV Continuous <Continuous>  dextrose 50% Injectable 25 Gram(s) IV Push once  dextrose 50% Injectable 12.5 Gram(s) IV Push once  dextrose 50% Injectable 25 Gram(s) IV Push once  enoxaparin Injectable 30 milliGRAM(s) SubCutaneous every 24 hours  glucagon  Injectable 1 milliGRAM(s) IntraMuscular once  insulin glargine Injectable (LANTUS) 9 Unit(s) SubCutaneous at bedtime  insulin lispro (ADMELOG) corrective regimen sliding scale   SubCutaneous three times a day before meals  insulin lispro Injectable (ADMELOG) 3 Unit(s) SubCutaneous three times a day before meals    MEDICATIONS  (PRN):  dextrose Oral Gel 15 Gram(s) Oral once PRN Blood Glucose LESS THAN 70 milliGRAM(s)/deciliter      I&O's Summary      PHYSICAL EXAM:  Vital Signs Last 24 Hrs  T(C): 36.6 (10 Aug 2022 16:03), Max: 36.8 (10 Aug 2022 09:00)  T(F): 97.9 (10 Aug 2022 16:03), Max: 98.2 (10 Aug 2022 09:00)  HR: 64 (10 Aug 2022 16:03) (55 - 65)  BP: 128/63 (10 Aug 2022 16:03) (128/63 - 144/85)  BP(mean): --  RR: 18 (10 Aug 2022 16:03) (18 - 18)  SpO2: 100% (10 Aug 2022 16:03) (99% - 100%)    Parameters below as of 10 Aug 2022 16:03  Patient On (Oxygen Delivery Method): room air      CONSTITUTIONAL: NAD, well-developed, well-groomed  EYES: PERRLA; conjunctiva and sclera clear  ENMT: Moist oral mucosa, no pharyngeal injection or exudates; normal dentition  NECK: Supple, no palpable masses; no thyromegaly  RESPIRATORY: Normal respiratory effort; lungs are clear to auscultation bilaterally  CARDIOVASCULAR: Regular rate and rhythm, normal S1 and S2, no murmur/rub/gallop; No lower extremity edema; Peripheral pulses are 2+ bilaterally  ABDOMEN: Nontender to palpation, normoactive bowel sounds, no rebound/guarding; No hepatosplenomegaly  MUSCULOSKELETAL:  Normal gait; no clubbing or cyanosis of digits; no joint swelling or tenderness to palpation  PSYCH: A+O to person, place, and time; affect appropriate  NEUROLOGY: CN 2-12 are intact and symmetric; no gross sensory deficits   SKIN: No rashes; no palpable lesions    LABS:                        11.5   8.00  )-----------( 188      ( 10 Aug 2022 09:55 )             35.6     08-10    141  |  106  |  36<H>  ----------------------------<  94  3.4<L>   |  23  |  2.80<H>    Ca    9.7      10 Aug 2022 09:55  Mg     2.10     08-10    TPro  6.9  /  Alb  3.8  /  TBili  0.4  /  DBili  x   /  AST  22  /  ALT  15  /  AlkPhos  82  08-10          Urinalysis Basic - ( 10 Aug 2022 14:10 )    Color: Light Yellow / Appearance: Clear / S.015 / pH: x  Gluc: x / Ketone: Negative  / Bili: Negative / Urobili: <2 mg/dL   Blood: x / Protein: 100 mg/dL / Nitrite: Negative   Leuk Esterase: Negative / RBC: 0 /HPF / WBC 0 /HPF   Sq Epi: x / Non Sq Epi: 0 /HPF / Bacteria: Negative            RADIOLOGY & ADDITIONAL TESTS:  New Results Reviewed Today:   New Imaging Personally Reviewed Today:  New Electrocardiogram Personally Reviewed Today:  Prior or Outpatient Records Reviewed Today:    COMMUNICATION:  Care Discussed with Consultants/Other Providers and Details of Discussion:  Discussions with Patient/Family: discussed plan of care for MRI, EEG with patient

## 2022-08-10 NOTE — H&P ADULT - PROBLEM SELECTOR PLAN 3
- Hb 11.5 on presentation – check ferritin, iron level, iron saturation, transferrin, TIBC  - Trend CBC for now  - Likely as a result of CKD

## 2022-08-10 NOTE — ED PROVIDER NOTE - OBJECTIVE STATEMENT
71M w/hx CKD, HTN, HLD, DM, referred to the ED by his PCP (Dr. Steffen Rene) for recurrent syncope over the last week. He reports multiple episodes of feeling lightheaded with vision darkening that then resolves spontaneously. He does not fully recall the events surrounding the episodes but endorses chest pressure during the episodes. He denies any falls, head injuries, accidents or other traumas, nausea, vomiting, fevers, or chills. He reports occasional urinary incontinence, without temporal relation to the episodes.

## 2022-08-10 NOTE — ED PROVIDER NOTE - ATTENDING CONTRIBUTION TO CARE
Dr. Monroy, Attending Physician-  I performed a face to face bedside interview with patient regarding history of present illness, review of symptoms and past medical history. I completed an independent physical exam.  I have discussed patient's plan of care with the fellow.      71M, HTN, HLD, CKD, who is sent in by his PMD for recurrent syncope. Has had multiple episodes of syncope/presyncope over the past week - episodes consist of lightheadedness and dizziness (not room spinning) and a clouding of his revision. Denies head or other bodily trauma. On ROS, reports SOB with prolonged ambulation. No headaches, fevers, chills, cough, sputum, belly pain, nvd, dysuria, hematuria, recent sick contacts, travel, trauma. Physical: afebrile, well appearing, bradycardic, ctabl, abdomen soft, no le edema. Plan: labs, trop, ekg, admit.

## 2022-08-10 NOTE — CONSULT NOTE ADULT - ASSESSMENT
71M w/hx CKD, HTN, HLD, DM, referred to the ED by his PCP (Dr. Steffen Rene) for recurrent syncope over the last week. nephrology consulted for acute on ckd    Acute on ckd stage 4 vs ckd stage 4  baseline ~ 2.4-2.5 follows up with dr grace in office  last visit in 7/13 showed scr worsen to 2.88  scr 2.8 on this admission  possible progression of ckd vs ankur  check urine cr, na  monitor bmp    proteinuria  known  vasculitis work up neg done in office  check urine p/c raito  off ACEI and ARB     htn  controlled  monitor    hypokalemia  supplement  monitor    syncope  work up per team 71M w/hx CKD, HTN, HLD, DM, referred to the ED by his PCP (Dr. Steffen Rene) for recurrent syncope over the last week. Nephrology consulted for JUNI on CKD stage 4.     JUNI on CKD stage 4 vs ckd stage 4  baseline ~ 2.4-2.5 follows up with dr grace in office  last visit in 7/13 showed scr worsen to 2.88  scr 2.8 on this admission  possible progression of ckd vs juni  check urine cr, na  monitor bmp    Proteinuria  known  vasculitis work up neg done in office  check urine p/c raito  off ACEI and ARB     HTN  controlled  monitor    Hypokalemia  supplement  monitor    syncope  work up per team

## 2022-08-10 NOTE — H&P ADULT - HISTORY OF PRESENT ILLNESS
Patient is a 72yo M with PMH of DM, HTN, HLD, and CKD who presented after 1 episode of syncope 1 day prior to presentation. He stated that he had walked down a flight of stairs and out of a building when he felt a sudden lightheadedness lasting maybe 30 seconds, associated with a trembling sensation in his upper extremities bilaterally, and fainted. His wife witnessed the event. He denied any trauma. EMS arrived and he was taken into an ambulance, during which he recovered consciousness. He admitted to a sense of confusion following the event. He also reported urinary incontinence. He denied fevers, chills, cough, sore throat, chest pain, palpitations, nausea/vomiting/diarrhea, constipation, abdominal pain, hearing changes, or visual changes. He reported a distant event of loss of consciousness in [facility], but stated it was different as he felt generalized weakness at that time.

## 2022-08-11 LAB
ALBUMIN SERPL ELPH-MCNC: 3.7 G/DL — SIGNIFICANT CHANGE UP (ref 3.3–5)
ALP SERPL-CCNC: 81 U/L — SIGNIFICANT CHANGE UP (ref 40–120)
ALT FLD-CCNC: 17 U/L — SIGNIFICANT CHANGE UP (ref 4–41)
ANION GAP SERPL CALC-SCNC: 14 MMOL/L — SIGNIFICANT CHANGE UP (ref 7–14)
AST SERPL-CCNC: 20 U/L — SIGNIFICANT CHANGE UP (ref 4–40)
BASOPHILS # BLD AUTO: 0.04 K/UL — SIGNIFICANT CHANGE UP (ref 0–0.2)
BASOPHILS NFR BLD AUTO: 0.5 % — SIGNIFICANT CHANGE UP (ref 0–2)
BILIRUB SERPL-MCNC: 0.5 MG/DL — SIGNIFICANT CHANGE UP (ref 0.2–1.2)
BUN SERPL-MCNC: 47 MG/DL — HIGH (ref 7–23)
CALCIUM SERPL-MCNC: 9.7 MG/DL — SIGNIFICANT CHANGE UP (ref 8.4–10.5)
CHLORIDE SERPL-SCNC: 104 MMOL/L — SIGNIFICANT CHANGE UP (ref 98–107)
CO2 SERPL-SCNC: 21 MMOL/L — LOW (ref 22–31)
CREAT SERPL-MCNC: 2.68 MG/DL — HIGH (ref 0.5–1.3)
CULTURE RESULTS: SIGNIFICANT CHANGE UP
EGFR: 24 ML/MIN/1.73M2 — LOW
EOSINOPHIL # BLD AUTO: 0.25 K/UL — SIGNIFICANT CHANGE UP (ref 0–0.5)
EOSINOPHIL NFR BLD AUTO: 3.1 % — SIGNIFICANT CHANGE UP (ref 0–6)
GLUCOSE BLDC GLUCOMTR-MCNC: 109 MG/DL — HIGH (ref 70–99)
GLUCOSE BLDC GLUCOMTR-MCNC: 113 MG/DL — HIGH (ref 70–99)
GLUCOSE BLDC GLUCOMTR-MCNC: 121 MG/DL — HIGH (ref 70–99)
GLUCOSE BLDC GLUCOMTR-MCNC: 148 MG/DL — HIGH (ref 70–99)
GLUCOSE SERPL-MCNC: 74 MG/DL — SIGNIFICANT CHANGE UP (ref 70–99)
HCT VFR BLD CALC: 36.3 % — LOW (ref 39–50)
HCV AB S/CO SERPL IA: 0.1 S/CO — SIGNIFICANT CHANGE UP (ref 0–0.99)
HCV AB SERPL-IMP: SIGNIFICANT CHANGE UP
HGB BLD-MCNC: 11.6 G/DL — LOW (ref 13–17)
IANC: 4.36 K/UL — SIGNIFICANT CHANGE UP (ref 1.8–7.4)
IMM GRANULOCYTES NFR BLD AUTO: 0.3 % — SIGNIFICANT CHANGE UP (ref 0–1.5)
LYMPHOCYTES # BLD AUTO: 2.42 K/UL — SIGNIFICANT CHANGE UP (ref 1–3.3)
LYMPHOCYTES # BLD AUTO: 30.4 % — SIGNIFICANT CHANGE UP (ref 13–44)
MCHC RBC-ENTMCNC: 27.9 PG — SIGNIFICANT CHANGE UP (ref 27–34)
MCHC RBC-ENTMCNC: 32 GM/DL — SIGNIFICANT CHANGE UP (ref 32–36)
MCV RBC AUTO: 87.3 FL — SIGNIFICANT CHANGE UP (ref 80–100)
MONOCYTES # BLD AUTO: 0.86 K/UL — SIGNIFICANT CHANGE UP (ref 0–0.9)
MONOCYTES NFR BLD AUTO: 10.8 % — SIGNIFICANT CHANGE UP (ref 2–14)
NEUTROPHILS # BLD AUTO: 4.36 K/UL — SIGNIFICANT CHANGE UP (ref 1.8–7.4)
NEUTROPHILS NFR BLD AUTO: 54.9 % — SIGNIFICANT CHANGE UP (ref 43–77)
NRBC # BLD: 0 /100 WBCS — SIGNIFICANT CHANGE UP
NRBC # FLD: 0 K/UL — SIGNIFICANT CHANGE UP
PLATELET # BLD AUTO: 183 K/UL — SIGNIFICANT CHANGE UP (ref 150–400)
POTASSIUM SERPL-MCNC: 3.5 MMOL/L — SIGNIFICANT CHANGE UP (ref 3.5–5.3)
POTASSIUM SERPL-SCNC: 3.5 MMOL/L — SIGNIFICANT CHANGE UP (ref 3.5–5.3)
PROT SERPL-MCNC: 7 G/DL — SIGNIFICANT CHANGE UP (ref 6–8.3)
RBC # BLD: 4.16 M/UL — LOW (ref 4.2–5.8)
RBC # FLD: 14.1 % — SIGNIFICANT CHANGE UP (ref 10.3–14.5)
SODIUM SERPL-SCNC: 139 MMOL/L — SIGNIFICANT CHANGE UP (ref 135–145)
SPECIMEN SOURCE: SIGNIFICANT CHANGE UP
WBC # BLD: 7.95 K/UL — SIGNIFICANT CHANGE UP (ref 3.8–10.5)
WBC # FLD AUTO: 7.95 K/UL — SIGNIFICANT CHANGE UP (ref 3.8–10.5)

## 2022-08-11 PROCEDURE — 93306 TTE W/DOPPLER COMPLETE: CPT | Mod: 26

## 2022-08-11 RX ADMIN — Medication 650 MILLIGRAM(S): at 14:05

## 2022-08-11 RX ADMIN — HEPARIN SODIUM 5000 UNIT(S): 5000 INJECTION INTRAVENOUS; SUBCUTANEOUS at 18:30

## 2022-08-11 RX ADMIN — Medication 667 MILLIGRAM(S): at 10:04

## 2022-08-11 RX ADMIN — Medication 650 MILLIGRAM(S): at 22:00

## 2022-08-11 RX ADMIN — Medication 667 MILLIGRAM(S): at 14:05

## 2022-08-11 RX ADMIN — INSULIN GLARGINE 9 UNIT(S): 100 INJECTION, SOLUTION SUBCUTANEOUS at 21:59

## 2022-08-11 RX ADMIN — Medication 667 MILLIGRAM(S): at 18:31

## 2022-08-11 RX ADMIN — TAMSULOSIN HYDROCHLORIDE 0.4 MILLIGRAM(S): 0.4 CAPSULE ORAL at 22:00

## 2022-08-11 RX ADMIN — Medication 81 MILLIGRAM(S): at 11:39

## 2022-08-11 RX ADMIN — Medication 12.5 MILLIGRAM(S): at 18:31

## 2022-08-11 RX ADMIN — Medication 3 UNIT(S): at 18:30

## 2022-08-11 RX ADMIN — Medication 3 UNIT(S): at 10:03

## 2022-08-11 RX ADMIN — ATORVASTATIN CALCIUM 20 MILLIGRAM(S): 80 TABLET, FILM COATED ORAL at 22:00

## 2022-08-11 RX ADMIN — Medication 12.5 MILLIGRAM(S): at 05:52

## 2022-08-11 RX ADMIN — Medication 650 MILLIGRAM(S): at 05:43

## 2022-08-11 RX ADMIN — AMLODIPINE BESYLATE 10 MILLIGRAM(S): 2.5 TABLET ORAL at 05:52

## 2022-08-11 RX ADMIN — HEPARIN SODIUM 5000 UNIT(S): 5000 INJECTION INTRAVENOUS; SUBCUTANEOUS at 05:52

## 2022-08-11 RX ADMIN — Medication 3 UNIT(S): at 12:41

## 2022-08-11 NOTE — PROGRESS NOTE ADULT - SUBJECTIVE AND OBJECTIVE BOX
[facility] [place] [facility]   MD VELASQUEZ PERDOMO MD KRISTINE SOLTANPOUR, DO ANGELA WONG, PA    TEL:  OFFICE: [phone]  From 5pm-7am Answering Service [phone]    -- RENAL FOLLOW UP NOTE ---Date of Service 08-11-22 @ 11:18    Patient is a 71y old  Male who presents with a chief complaint of syncope (10 Aug 2022 16:09)      Patient seen and examined at bedside. No chest pain/sob    VITALS:  T(F): 98.2 (08-11-22 @ 05:52), Max: 98.3 (08-10-22 @ 22:14)  HR: 58 (08-11-22 @ 05:52)  BP: 141/64 (08-11-22 @ 05:52)  RR: 17 (08-11-22 @ 05:52)  SpO2: 100% (08-11-22 @ 05:52)  Wt(kg): --        PHYSICAL EXAM:  General: NAD  Neck: No JVD  Respiratory: CTAB, no wheezes, rales or rhonchi  Cardiovascular: S1, S2, RRR  Gastrointestinal: BS+, soft, NT/ND  Extremities: No peripheral edema    Hospital Medications:   MEDICATIONS  (STANDING):  amLODIPine   Tablet 10 milliGRAM(s) Oral daily  aspirin  chewable 81 milliGRAM(s) Oral daily  atorvastatin 20 milliGRAM(s) Oral at bedtime  calcium acetate 667 milliGRAM(s) Oral three times a day with meals  dextrose 5%. 1000 milliLiter(s) (100 mL/Hr) IV Continuous <Continuous>  dextrose 5%. 1000 milliLiter(s) (50 mL/Hr) IV Continuous <Continuous>  dextrose 50% Injectable 25 Gram(s) IV Push once  dextrose 50% Injectable 12.5 Gram(s) IV Push once  dextrose 50% Injectable 25 Gram(s) IV Push once  glucagon  Injectable 1 milliGRAM(s) IntraMuscular once  heparin   Injectable 5000 Unit(s) SubCutaneous every 12 hours  insulin glargine Injectable (LANTUS) 9 Unit(s) SubCutaneous at bedtime  insulin lispro (ADMELOG) corrective regimen sliding scale   SubCutaneous three times a day before meals  insulin lispro Injectable (ADMELOG) 3 Unit(s) SubCutaneous three times a day before meals  metoprolol tartrate 12.5 milliGRAM(s) Oral two times a day  sodium bicarbonate 650 milliGRAM(s) Oral three times a day  tamsulosin 0.4 milliGRAM(s) Oral at bedtime      LABS:  08-11    139  |  104  |  47<H>  ----------------------------<  74  3.5   |  21<L>  |  2.68<H>    Ca    9.7      11 Aug 2022 07:06  Phos  3.7     08-10  Mg     2.20     08-10    TPro  7.0  /  Alb  3.7  /  TBili  0.5  /  DBili      /  AST  20  /  ALT  17  /  AlkPhos  81  08-11    Creatinine Trend: 2.68 <--, 2.95 <--, 2.80 <--    Albumin, Serum: 3.7 g/dL (08-11 @ 07:06)  Phosphorus Level, Serum: 3.7 mg/dL (08-10 @ 21:26)                              11.6   7.95  )-----------( 183      ( 11 Aug 2022 07:06 )             36.3     Urine Studies:  Urinalysis - [08-10-22 @ 14:10]      Color Light Yellow / Appearance Clear / SG 1.015 / pH 6.0      Gluc >= 1000 mg/dL / Ketone Negative  / Bili Negative / Urobili <2 mg/dL       Blood Negative / Protein 100 mg/dL / Leuk Est Negative / Nitrite Negative      RBC 0 / WBC 0 / Hyaline 2 / Gran  / Sq Epi  / Non Sq Epi 0 / Bacteria Negative      HbA1c 7.3      [10-06-17 @ 05:30]        RADIOLOGY & ADDITIONAL STUDIES:

## 2022-08-11 NOTE — PROGRESS NOTE ADULT - ASSESSMENT
Problem/Plan - 1:  ·  Problem: Syncope and collapse.   ·  Plan: - Patient with 1 episode of sudden onset lightheadedness and dizziness, with b/l UE shaking, and followed by period of confusion; associated with urinary incontinence  - Most likely seizure activity – first occurrence  - CT head unremarkable  - Telemetry monitoring  - Check troponin  - ECG: sinus bradycardia, TWIs II, III, aVF, V5-V6  - Obtain TTE to evaluate for structural causes  - Obtain MRI seizure protocol  - Obtain video EEG  - Neuro checks q4h  - Potentially due to hypoglycemia – f/s qachs – though patient did not check f/s at the time of the event  - PT/OT  - if work-up nonrevealing, neurology consult.    Problem/Plan - 2:  ·  Problem: T2DM (type 2 diabetes mellitus).   ·  Plan: - Home regimen: insulin detemir 15U subq before lunch once daily; farxiga  - fingersticks qachs - goal 140-180  - start weight-based insulin with basal glargine qhs and pre-prandial lispro qac TID plus correction dose sliding scale  - advised patient to not take his own medications.    Problem/Plan - 3:  ·  Problem: Normocytic anemia.   ·  Plan: - Hb 11.5 on presentation – check ferritin, iron level, iron saturation, transferrin, TIBC  - Trend CBC for now  - Likely as a result of CKD.  Problem/Plan - 4:  ·  Problem: Hypokalemia.   ·  Plan: - K 3.4 on presentation, replenish as warranted.    Problem/Plan - 5:  ·  Problem: Stage 4 chronic kidney disease.   ·  Plan: - Cr 2.8 on presentation – prior known baseline 2.4-2.5 outpatient  - Progression vs JUNI  - Check urine Cr, Na, osm  - Trend BMP  - Continue home calcium acetate, sodium bicarbonate tabs  - Nephrology following.    Problem/Plan - 6:  ·  Problem: Elevated brain natriuretic peptide (BNP) level.   ·  Plan: - proBNP 715 on presentation – no known baseline  - CXR unremarkable.    Problem/Plan - 7:  ·  Problem: HTN (hypertension).   ·  Plan: - Goal normotension – resume home meds.    Problem/Plan - 8:  ·  Problem: Prophylactic measure.   ·  Plan: - lovenox.

## 2022-08-11 NOTE — OCCUPATIONAL THERAPY INITIAL EVALUATION ADULT - GENERAL OBSERVATIONS, REHAB EVAL
Pt. received standing in room. No acute distress. Patient agreed to evaluation from Occupational Therapist.

## 2022-08-11 NOTE — PATIENT PROFILE ADULT - FUNCTIONAL ASSESSMENT - BASIC MOBILITY 3.
D: Received referral from [facility] to DDP or ADT. Pt was in DDP for one day in October. He did not return and reported it was 'to far away' and he was not interested in Tx.. Prior to admission to [facility], pt reports using cannabis daily and not taking his medications.    I: Review of EMR. LM with Polly MELENDEZ to discuss referral.    A: Pending    P: Plan to consult CTC and set up a time to meet with pt on unit to discuss his interest in Tx and/or identify any barriers.    Tami Reddy, SUZANNE, [facility]  11/15/2017           3 = A little assistance

## 2022-08-11 NOTE — OCCUPATIONAL THERAPY INITIAL EVALUATION ADULT - ASR WT BEARING STATUS EVAL
confirmed with Don from [facility] (Pager [phone])/no weight-bearing restrictions

## 2022-08-11 NOTE — OCCUPATIONAL THERAPY INITIAL EVALUATION ADULT - LIVES WITH, PROFILE
Pt. reports he lives with his wife, son, and daughter-in-law in an apartment building with no steps to enter. Once inside, pt. reports he has an elevator available to reach apartment.

## 2022-08-11 NOTE — OCCUPATIONAL THERAPY INITIAL EVALUATION ADULT - PERTINENT HX OF CURRENT PROBLEM, REHAB EVAL
Pt is a 71 year old male with hx of DM, HTN, HLD, and CKD, who presented to [facility] on 8/10/22 after 1 episode of syncope 1 day prior to presentation. CT Head No Contrast on 8/10/22 displayed no evidence of acute hemorrhage, mass or mass effect.

## 2022-08-11 NOTE — CONSULT NOTE ADULT - ASSESSMENT
72yo [place] M with DM, HTN, HLD, and CKD who presented after 1 episode of syncope 1 day prior to presentation. He stated that he had walked down a flight of stairs and out of a building when he felt a sudden lightheadedness lasting maybe 30 seconds, associated with a trembling sensation in his upper extremities bilaterally, and fainted. no head trauma. + LOC, + incontinence, no tongue bite, a bit confused shortly after, now baseline. states dizzy when stands  CTH unremarkable     Impression: Syncope more likely than seizure.   - check orthostatics   - cardiac workup, TTE, tele  - c/w asa and statin therapy   - MRI brain seizure protocol. will also add MRA H/N  - vEEG pending    - Hemoglobin A1c and lipid panel  - PT/OT  - check FS, glucose control <180  - GI/DVT ppx  - Counseling on diet, exercise, and medication adherence was done  - Counseling on smoking cessation and alcohol consumption offered when appropriate.  - Pain assessed and judicious use of narcotics when appropriate was discussed.    - Stroke education given when appropriate.  - Importance of fall prevention discussed.   - Differential diagnosis and plan of care discussed with patient and/or family and primary team  - Thank you for allowing me to participate in the care of this patient. Call with questions.   Jason Rosales MD  Vascular Neurology  Office: [phone]

## 2022-08-11 NOTE — CONSULT NOTE ADULT - SUBJECTIVE AND OBJECTIVE BOX
Cardiovascular Disease Initial Evaluation    CHIEF COMPLAINT: Passing out    HISTORY OF PRESENT ILLNESS:    This is a 71 year old man with IDDM, HTN, HLD, and CKD IV who presented to [facility] on 8/10/2022 after 1 episode of syncope 1 day prior to presentation. He stated that he had walked down a flight of stairs and out of a building when he felt a sudden lightheadedness lasting maybe 30 seconds, associated with a trembling sensation in his upper extremities bilaterally, and fainted. His wife witnessed the event. He denied any trauma. EMS arrived and he was taken into an ambulance, during which he recovered consciousness. He admitted to a sense of confusion following the event. He also reported urinary incontinence. He denied fevers, chills, cough, sore throat, chest pain, palpitations, nausea/vomiting/diarrhea, constipation, abdominal pain, hearing changes, or visual changes. He reported a distant event of loss of consciousness in [facility], but stated it was different as he felt generalized weakness at that time.    Allergies    No Known Allergies      MEDICATIONS:  amLODIPine   Tablet 10 milliGRAM(s) Oral daily  aspirin  chewable 81 milliGRAM(s) Oral daily  heparin   Injectable 5000 Unit(s) SubCutaneous every 12 hours  metoprolol tartrate 12.5 milliGRAM(s) Oral two times a day  tamsulosin 0.4 milliGRAM(s) Oral at bedtime            atorvastatin 20 milliGRAM(s) Oral at bedtime  dextrose 50% Injectable 25 Gram(s) IV Push once  dextrose 50% Injectable 12.5 Gram(s) IV Push once  dextrose 50% Injectable 25 Gram(s) IV Push once  dextrose Oral Gel 15 Gram(s) Oral once PRN  glucagon  Injectable 1 milliGRAM(s) IntraMuscular once  insulin glargine Injectable (LANTUS) 9 Unit(s) SubCutaneous at bedtime  insulin lispro (ADMELOG) corrective regimen sliding scale   SubCutaneous three times a day before meals  insulin lispro Injectable (ADMELOG) 3 Unit(s) SubCutaneous three times a day before meals    calcium acetate 667 milliGRAM(s) Oral three times a day with meals  dextrose 5%. 1000 milliLiter(s) IV Continuous <Continuous>  dextrose 5%. 1000 milliLiter(s) IV Continuous <Continuous>  sodium bicarbonate 650 milliGRAM(s) Oral three times a day      PAST MEDICAL & SURGICAL HISTORY:  DM (diabetes mellitus)      HTN (hypertension)      Stage 4 chronic kidney disease      No significant past surgical history          FAMILY HISTORY:  No pertinent family history in first degree relatives        SOCIAL HISTORY:    The patient is a nonsmoker       REVIEW OF SYSTEMS:  See HPI, otherwise complete 14 point review of systems negative      PHYSICAL EXAM:  T(C): 36.8 (08-11-22 @ 05:52), Max: 36.8 (08-10-22 @ 22:14)  HR: 58 (08-11-22 @ 05:52) (58 - 67)  BP: 141/64 (08-11-22 @ 05:52) (128/63 - 141/64)  RR: 17 (08-11-22 @ 05:52) (16 - 18)  SpO2: 100% (08-11-22 @ 05:52) (100% - 100%)  Wt(kg): --  I&O's Summary      Appearance: No Acute Distress; resting comfortably  HEENT:  Normal oral mucosa, PERRL, EOMI	  Cardiovascular: Normal S1 S2, No JVD, No murmurs/rubs/gallops  Respiratory: Normal respiratory effort; Lungs clear to auscultation bilaterally  Gastrointestinal:  Soft, Non-tender, + BS	  Skin: No rashes, No ecchymoses, No cyanosis	  Neurologic: Non-focal; no weakness  Extremities: No clubbing, cyanosis or edema  Vascular: Peripheral pulses palpable 2+ bilaterally  Psychiatry: A & O x 3, Mood & affect appropriate    Laboratory Data:	 	    CBC Full  -  ( 11 Aug 2022 07:06 )  WBC Count : 7.95 K/uL  Hemoglobin : 11.6 g/dL  Hematocrit : 36.3 %  Platelet Count - Automated : 183 K/uL  Mean Cell Volume : 87.3 fL  Mean Cell Hemoglobin : 27.9 pg  Mean Cell Hemoglobin Concentration : 32.0 gm/dL  Auto Neutrophil # : 4.36 K/uL  Auto Lymphocyte # : 2.42 K/uL  Auto Monocyte # : 0.86 K/uL  Auto Eosinophil # : 0.25 K/uL  Auto Basophil # : 0.04 K/uL  Auto Neutrophil % : 54.9 %  Auto Lymphocyte % : 30.4 %  Auto Monocyte % : 10.8 %  Auto Eosinophil % : 3.1 %  Auto Basophil % : 0.5 %    08-11    139  |  104  |  47<H>  ----------------------------<  74  3.5   |  21<L>  |  2.68<H>  08-10    141  |  104  |  46<H>  ----------------------------<  133<H>  3.8   |  26  |  2.95<H>    Ca    9.7      11 Aug 2022 07:06  Ca    9.9      10 Aug 2022 21:26  Phos  3.7     08-10  Mg     2.20     08-10  Mg     2.10     08-10    TPro  7.0  /  Alb  3.7  /  TBili  0.5  /  DBili  x   /  AST  20  /  ALT  17  /  AlkPhos  81  08-11  TPro  6.9  /  Alb  3.8  /  TBili  0.4  /  DBili  x   /  AST  22  /  ALT  15  /  AlkPhos  82  08-10      Interpretation of Telemetry: Sinus	    ECG:  Sinus; LVH with repolarization changes    Assessment: 71 year old man with IDDM, HTN, HLD, and CKD IV presents with syncope.     Plan of Care:    #Syncope-  Likely vasovagal etiology given the associated prodrome prior to passing out.  EKG is sinus and no ectopy noted on telemetry thus far.  Obtain echocardiogram for further evaluation.    #HTN-  BP is currently acceptable.    #CKD IV-  Renal input noted.     #ACP (advance care planning)-  Advanced care planning was discussed with the patient.    Cardiac findings were discussed in detail and all questions were answered.       52 minutes spent on total encounter; more than 50% of the visit was spent counseling and/or coordinating care by the attending physician.   	  Bc Kang MD [facility]  Cardiovascular Diseases  [phone]

## 2022-08-11 NOTE — PROGRESS NOTE ADULT - SUBJECTIVE AND OBJECTIVE BOX
Patient is a 71y old  Male who presents with a chief complaint of syncope (11 Aug 2022 11:23)    Date of servie : 22 @ 15:15  INTERVAL HPI/OVERNIGHT EVENTS:  T(C): 36.3 (22 @ 12:15), Max: 36.8 (08-10-22 @ 22:14)  HR: 60 (22 @ 12:15) (58 - 68)  BP: 128/61 (22 @ 12:15) (128/61 - 141/64)  RR: 18 (22 @ 12:15) (16 - 18)  SpO2: 99% (22 @ 12:15) (99% - 100%)  Wt(kg): --  I&O's Summary      LABS:                        11.6   7.95  )-----------( 183      ( 11 Aug 2022 07:06 )             36.3     08-11    139  |  104  |  47<H>  ----------------------------<  74  3.5   |  21<L>  |  2.68<H>    Ca    9.7      11 Aug 2022 07:06  Phos  3.7     08-10  Mg     2.20     08-10    TPro  7.0  /  Alb  3.7  /  TBili  0.5  /  DBili  x   /  AST  20  /  ALT  17  /  AlkPhos  81  08-11      Urinalysis Basic - ( 10 Aug 2022 14:10 )    Color: Light Yellow / Appearance: Clear / S.015 / pH: x  Gluc: x / Ketone: Negative  / Bili: Negative / Urobili: <2 mg/dL   Blood: x / Protein: 100 mg/dL / Nitrite: Negative   Leuk Esterase: Negative / RBC: 0 /HPF / WBC 0 /HPF   Sq Epi: x / Non Sq Epi: 0 /HPF / Bacteria: Negative      CAPILLARY BLOOD GLUCOSE      POCT Blood Glucose.: 113 mg/dL (11 Aug 2022 12:19)  POCT Blood Glucose.: 121 mg/dL (11 Aug 2022 08:59)  POCT Blood Glucose.: 119 mg/dL (10 Aug 2022 21:36)  POCT Blood Glucose.: 120 mg/dL (10 Aug 2022 15:25)        Urinalysis Basic - ( 10 Aug 2022 14:10 )    Color: Light Yellow / Appearance: Clear / S.015 / pH: x  Gluc: x / Ketone: Negative  / Bili: Negative / Urobili: <2 mg/dL   Blood: x / Protein: 100 mg/dL / Nitrite: Negative   Leuk Esterase: Negative / RBC: 0 /HPF / WBC 0 /HPF   Sq Epi: x / Non Sq Epi: 0 /HPF / Bacteria: Negative        MEDICATIONS  (STANDING):  amLODIPine   Tablet 10 milliGRAM(s) Oral daily  aspirin  chewable 81 milliGRAM(s) Oral daily  atorvastatin 20 milliGRAM(s) Oral at bedtime  calcium acetate 667 milliGRAM(s) Oral three times a day with meals  dextrose 5%. 1000 milliLiter(s) (100 mL/Hr) IV Continuous <Continuous>  dextrose 5%. 1000 milliLiter(s) (50 mL/Hr) IV Continuous <Continuous>  dextrose 50% Injectable 25 Gram(s) IV Push once  dextrose 50% Injectable 12.5 Gram(s) IV Push once  dextrose 50% Injectable 25 Gram(s) IV Push once  glucagon  Injectable 1 milliGRAM(s) IntraMuscular once  heparin   Injectable 5000 Unit(s) SubCutaneous every 12 hours  insulin glargine Injectable (LANTUS) 9 Unit(s) SubCutaneous at bedtime  insulin lispro (ADMELOG) corrective regimen sliding scale   SubCutaneous three times a day before meals  insulin lispro Injectable (ADMELOG) 3 Unit(s) SubCutaneous three times a day before meals  metoprolol tartrate 12.5 milliGRAM(s) Oral two times a day  sodium bicarbonate 650 milliGRAM(s) Oral three times a day  tamsulosin 0.4 milliGRAM(s) Oral at bedtime    MEDICATIONS  (PRN):  dextrose Oral Gel 15 Gram(s) Oral once PRN Blood Glucose LESS THAN 70 milliGRAM(s)/deciliter          PHYSICAL EXAM:  GENERAL: NAD, well-groomed, well-developed  HEAD:  Atraumatic, Normocephalic  CHEST/LUNG: Clear to percussion bilaterally; No rales, rhonchi, wheezing, or rubs  HEART: Regular rate and rhythm; No murmurs, rubs, or gallops  ABDOMEN: Soft, Nontender, Nondistended; Bowel sounds present  EXTREMITIES:  2+ Peripheral Pulses, No clubbing, cyanosis, or edema  LYMPH: No lymphadenopathy noted  SKIN: No rashes or lesions    Care Discussed with Consultants/Other Providers [ ] YES  [ ] NO

## 2022-08-11 NOTE — PATIENT PROFILE ADULT - FALL HARM RISK - HARM RISK INTERVENTIONS

## 2022-08-11 NOTE — CONSULT NOTE ADULT - SUBJECTIVE AND OBJECTIVE BOX
Neurology Consult    Reason for Consult: Patient is a 71y old  Male who presents with a chief complaint of syncope (11 Aug 2022 11:17)      HPI:  Patient is a 72yo M with PMH of DM, HTN, HLD, and CKD who presented after 1 episode of syncope 1 day prior to presentation. He stated that he had walked down a flight of stairs and out of a building when he felt a sudden lightheadedness lasting maybe 30 seconds, associated with a trembling sensation in his upper extremities bilaterally, and fainted. His wife witnessed the event. He denied any trauma. EMS arrived and he was taken into an ambulance, during which he recovered consciousness. He admitted to a sense of confusion following the event. He also reported urinary incontinence. He denied fevers, chills, cough, sore throat, chest pain, palpitations, nausea/vomiting/diarrhea, constipation, abdominal pain, hearing changes, or visual changes. He reported a distant event of loss of consciousness in [facility], but stated it was different as he felt generalized weakness at that time.   (10 Aug 2022 16:09)       PAST MEDICAL & SURGICAL HISTORY:  DM (diabetes mellitus)      HTN (hypertension)      Stage 4 chronic kidney disease      No significant past surgical history          Allergies: Allergies    No Known Allergies    Intolerances        Social History: Denies toxic habits including tobacco, ETOH or illicit drugs.    Family History: FAMILY HISTORY:  No pertinent family history in first degree relatives    . No family history of strokes    Medications: MEDICATIONS  (STANDING):  amLODIPine   Tablet 10 milliGRAM(s) Oral daily  aspirin  chewable 81 milliGRAM(s) Oral daily  atorvastatin 20 milliGRAM(s) Oral at bedtime  calcium acetate 667 milliGRAM(s) Oral three times a day with meals  dextrose 5%. 1000 milliLiter(s) (100 mL/Hr) IV Continuous <Continuous>  dextrose 5%. 1000 milliLiter(s) (50 mL/Hr) IV Continuous <Continuous>  dextrose 50% Injectable 25 Gram(s) IV Push once  dextrose 50% Injectable 12.5 Gram(s) IV Push once  dextrose 50% Injectable 25 Gram(s) IV Push once  glucagon  Injectable 1 milliGRAM(s) IntraMuscular once  heparin   Injectable 5000 Unit(s) SubCutaneous every 12 hours  insulin glargine Injectable (LANTUS) 9 Unit(s) SubCutaneous at bedtime  insulin lispro (ADMELOG) corrective regimen sliding scale   SubCutaneous three times a day before meals  insulin lispro Injectable (ADMELOG) 3 Unit(s) SubCutaneous three times a day before meals  metoprolol tartrate 12.5 milliGRAM(s) Oral two times a day  sodium bicarbonate 650 milliGRAM(s) Oral three times a day  tamsulosin 0.4 milliGRAM(s) Oral at bedtime    MEDICATIONS  (PRN):  dextrose Oral Gel 15 Gram(s) Oral once PRN Blood Glucose LESS THAN 70 milliGRAM(s)/deciliter      Review of Systems:  CONSTITUTIONAL:  No weight loss, fever, chills, weakness or fatigue.  HEENT:  Eyes:  No visual loss, blurred vision, double vision or yellow sclera. Ears, Nose, Throat:  No hearing loss, sneezing, congestion, runny nose or sore throat.  SKIN:  No rash or itching.  CARDIOVASCULAR:  No chest pain, chest pressure or chest discomfort. No palpitations or edema.  RESPIRATORY:  No shortness of breath, cough or sputum.  GASTROINTESTINAL:  No anorexia, nausea, vomiting or diarrhea. No abdominal pain or blood.  GENITOURINARY:  No burning on urination or incontinence   NEUROLOGICAL:  No headache, dizziness, +syncope, no paralysis, ataxia, numbness or tingling in the extremities. No change in bowel or bladder control. no limb weakness. no vision changes.   MUSCULOSKELETAL:  No muscle, back pain, joint pain or stiffness.  HEMATOLOGIC:  No anemia, bleeding or bruising.  LYMPHATICS:  No enlarged nodes. No history of splenectomy.  PSYCHIATRIC:  No history of depression or anxiety.  ENDOCRINOLOGIC:  No reports of sweating, cold or heat intolerance. No polyuria or polydipsia.      Vitals:  Vital Signs Last 24 Hrs  T(C): 36.8 (11 Aug 2022 05:52), Max: 36.8 (10 Aug 2022 22:14)  T(F): 98.2 (11 Aug 2022 05:52), Max: 98.3 (10 Aug 2022 22:14)  HR: 58 (11 Aug 2022 05:52) (58 - 67)  BP: 141/64 (11 Aug 2022 05:52) (128/63 - 141/64)  BP(mean): --  RR: 17 (11 Aug 2022 05:52) (16 - 18)  SpO2: 100% (11 Aug 2022 05:52) (100% - 100%)    Parameters below as of 11 Aug 2022 05:52  Patient On (Oxygen Delivery Method): room air    Orthostatic VS      General Exam:   General Appearance: Appropriately dressed and in no acute distress       Head: Normocephalic, atraumatic and no dysmorphic features  Ear, Nose, and Throat: Moist mucous membranes  CVS: S1S2+  Resp: No SOB, no wheeze or rhonchi  GI: soft NT/ND  Extremities: No edema or cyanosis  Skin: No bruises or rashes     Neurological Exam:  Mental Status: Awake, alert and oriented x 3.  Able to follow simple and complex verbal commands. Able to name and repeat. fluent speech. No obvious aphasia or dysarthria noted.   Cranial Nerves: PERRL, EOMI, VFFC, sensation V1-V3 intact,  no obvious facial asymmetry, equal elevation of palate, scm/trap 5/5, tongue is midline on protrusion. no obvious papilledema on fundoscopic exam. hearing is grossly intact.   Motor: Normal bulk, tone and strength throughout. Fine finger movements were intact and symmetric. no tremors or drift noted.    Sensation: Intact to light touch and pinprick throughout. no right/left confusion. no extinction to tactile on DSS.   Reflexes: 1+ throughout at biceps, brachioradialis, triceps, patellars and ankles bilaterally and equal. No clonus. R toe and L toe were both downgoing.  Coordination: No dysmetria on FNF or HKS  Gait:deferred  in ED     Data/Labs/Imaging which I personally reviewed.     Labs:     CBC Full  -  ( 11 Aug 2022 07:06 )  WBC Count : 7.95 K/uL  RBC Count : 4.16 M/uL  Hemoglobin : 11.6 g/dL  Hematocrit : 36.3 %  Platelet Count - Automated : 183 K/uL  Mean Cell Volume : 87.3 fL  Mean Cell Hemoglobin : 27.9 pg  Mean Cell Hemoglobin Concentration : 32.0 gm/dL  Auto Neutrophil # : 4.36 K/uL  Auto Lymphocyte # : 2.42 K/uL  Auto Monocyte # : 0.86 K/uL  Auto Eosinophil # : 0.25 K/uL  Auto Basophil # : 0.04 K/uL  Auto Neutrophil % : 54.9 %  Auto Lymphocyte % : 30.4 %  Auto Monocyte % : 10.8 %  Auto Eosinophil % : 3.1 %  Auto Basophil % : 0.5 %        139  |  104  |  47<H>  ----------------------------<  74  3.5   |  21<L>  |  2.68<H>    Ca    9.7      11 Aug 2022 07:06  Phos  3.7     08-10  Mg     2.20     08-10    TPro  7.0  /  Alb  3.7  /  TBili  0.5  /  DBili  x   /  AST  20  /  ALT  17  /  AlkPhos  81  08-11    LIVER FUNCTIONS - ( 11 Aug 2022 07:06 )  Alb: 3.7 g/dL / Pro: 7.0 g/dL / ALK PHOS: 81 U/L / ALT: 17 U/L / AST: 20 U/L / GGT: x             Urinalysis Basic - ( 10 Aug 2022 14:10 )    Color: Light Yellow / Appearance: Clear / S.015 / pH: x  Gluc: x / Ketone: Negative  / Bili: Negative / Urobili: <2 mg/dL   Blood: x / Protein: 100 mg/dL / Nitrite: Negative   Leuk Esterase: Negative / RBC: 0 /HPF / WBC 0 /HPF   Sq Epi: x / Non Sq Epi: 0 /HPF / Bacteria: Negative        < from: CT Head No Cont (08.10.22 @ 14:29) >    ACC: [id number] EXAM:  CT BRAIN                          PROCEDURE DATE:  08/10/2022          INTERPRETATION:  Clinical indication: Syncope.    Multiple axial sections were performed from base of skull to vertex   without contrast enhancement coronaland sagittal reconstructions were   performed as well.    Parenchymal volume loss and chronic microvascular ischemic changes are   identified    There is no acute hemorrhage mass or mass effect seen.    Evaluation of the osseous structures with the appropriate window appears   unremarkable    The visualized paranasal sinuses mastoid and middle ear regions appear   clear.    IMPRESSION: No evidence of acute hemorrhage, mass or mass effect.    --- End of Report ---            BRITNI COLLADO MD; Attending Radiologist  This document has been electronically signed. Aug 10 2022  2:27PM    < end of copied text >

## 2022-08-11 NOTE — PHYSICAL THERAPY INITIAL EVALUATION ADULT - PERTINENT HX OF CURRENT PROBLEM, REHAB EVAL
Patient presented with increased complaints of dizziness and headaches, as well as generalized weakness

## 2022-08-11 NOTE — PROGRESS NOTE ADULT - ASSESSMENT
71M w/hx CKD, HTN, HLD, DM, referred to the ED by his PCP (Dr. Steffen Rene) for recurrent syncope over the last week. Nephrology consulted for JUNI on CKD stage 4.     JUNI on CKD stage 4 vs ckd stage 4  baseline ~ 2.4-2.5 follows up with dr grace in office  last visit in 7/13 showed scr worsen to 2.88  scr 2.8 on this admission slightly better today  possible progression of ckd vs juni  check urine cr, na  monitor bmp    Proteinuria  known  vasculitis work up neg done in office  check urine p/c raito  off ACEI and ARB     HTN  controlled  monitor    Hypokalemia  supplement  monitor    syncope  work up per team

## 2022-08-12 LAB
ANION GAP SERPL CALC-SCNC: 15 MMOL/L — HIGH (ref 7–14)
BASOPHILS # BLD AUTO: 0.04 K/UL — SIGNIFICANT CHANGE UP (ref 0–0.2)
BASOPHILS NFR BLD AUTO: 0.5 % — SIGNIFICANT CHANGE UP (ref 0–2)
BUN SERPL-MCNC: 48 MG/DL — HIGH (ref 7–23)
CALCIUM SERPL-MCNC: 9.4 MG/DL — SIGNIFICANT CHANGE UP (ref 8.4–10.5)
CHLORIDE SERPL-SCNC: 104 MMOL/L — SIGNIFICANT CHANGE UP (ref 98–107)
CO2 SERPL-SCNC: 21 MMOL/L — LOW (ref 22–31)
CREAT SERPL-MCNC: 2.79 MG/DL — HIGH (ref 0.5–1.3)
EGFR: 23 ML/MIN/1.73M2 — LOW
EOSINOPHIL # BLD AUTO: 0.29 K/UL — SIGNIFICANT CHANGE UP (ref 0–0.5)
EOSINOPHIL NFR BLD AUTO: 3.7 % — SIGNIFICANT CHANGE UP (ref 0–6)
GLUCOSE BLDC GLUCOMTR-MCNC: 112 MG/DL — HIGH (ref 70–99)
GLUCOSE BLDC GLUCOMTR-MCNC: 134 MG/DL — HIGH (ref 70–99)
GLUCOSE BLDC GLUCOMTR-MCNC: 144 MG/DL — HIGH (ref 70–99)
GLUCOSE BLDC GLUCOMTR-MCNC: 78 MG/DL — SIGNIFICANT CHANGE UP (ref 70–99)
GLUCOSE SERPL-MCNC: 77 MG/DL — SIGNIFICANT CHANGE UP (ref 70–99)
HCT VFR BLD CALC: 34.1 % — LOW (ref 39–50)
HGB BLD-MCNC: 11.2 G/DL — LOW (ref 13–17)
IANC: 4.35 K/UL — SIGNIFICANT CHANGE UP (ref 1.8–7.4)
IMM GRANULOCYTES NFR BLD AUTO: 0.3 % — SIGNIFICANT CHANGE UP (ref 0–1.5)
LYMPHOCYTES # BLD AUTO: 2.38 K/UL — SIGNIFICANT CHANGE UP (ref 1–3.3)
LYMPHOCYTES # BLD AUTO: 30.2 % — SIGNIFICANT CHANGE UP (ref 13–44)
MAGNESIUM SERPL-MCNC: 2.1 MG/DL — SIGNIFICANT CHANGE UP (ref 1.6–2.6)
MCHC RBC-ENTMCNC: 28.2 PG — SIGNIFICANT CHANGE UP (ref 27–34)
MCHC RBC-ENTMCNC: 32.8 GM/DL — SIGNIFICANT CHANGE UP (ref 32–36)
MCV RBC AUTO: 85.9 FL — SIGNIFICANT CHANGE UP (ref 80–100)
MONOCYTES # BLD AUTO: 0.79 K/UL — SIGNIFICANT CHANGE UP (ref 0–0.9)
MONOCYTES NFR BLD AUTO: 10 % — SIGNIFICANT CHANGE UP (ref 2–14)
NEUTROPHILS # BLD AUTO: 4.35 K/UL — SIGNIFICANT CHANGE UP (ref 1.8–7.4)
NEUTROPHILS NFR BLD AUTO: 55.3 % — SIGNIFICANT CHANGE UP (ref 43–77)
NRBC # BLD: 0 /100 WBCS — SIGNIFICANT CHANGE UP
NRBC # FLD: 0 K/UL — SIGNIFICANT CHANGE UP
PHOSPHATE SERPL-MCNC: 3.7 MG/DL — SIGNIFICANT CHANGE UP (ref 2.5–4.5)
PLATELET # BLD AUTO: 183 K/UL — SIGNIFICANT CHANGE UP (ref 150–400)
POTASSIUM SERPL-MCNC: 3.3 MMOL/L — LOW (ref 3.5–5.3)
POTASSIUM SERPL-SCNC: 3.3 MMOL/L — LOW (ref 3.5–5.3)
RBC # BLD: 3.97 M/UL — LOW (ref 4.2–5.8)
RBC # FLD: 14.1 % — SIGNIFICANT CHANGE UP (ref 10.3–14.5)
SODIUM SERPL-SCNC: 140 MMOL/L — SIGNIFICANT CHANGE UP (ref 135–145)
WBC # BLD: 7.87 K/UL — SIGNIFICANT CHANGE UP (ref 3.8–10.5)
WBC # FLD AUTO: 7.87 K/UL — SIGNIFICANT CHANGE UP (ref 3.8–10.5)

## 2022-08-12 RX ORDER — POTASSIUM CHLORIDE 20 MEQ
20 PACKET (EA) ORAL ONCE
Refills: 0 | Status: COMPLETED | OUTPATIENT
Start: 2022-08-12 | End: 2022-08-12

## 2022-08-12 RX ADMIN — AMLODIPINE BESYLATE 10 MILLIGRAM(S): 2.5 TABLET ORAL at 05:30

## 2022-08-12 RX ADMIN — Medication 667 MILLIGRAM(S): at 08:59

## 2022-08-12 RX ADMIN — HEPARIN SODIUM 5000 UNIT(S): 5000 INJECTION INTRAVENOUS; SUBCUTANEOUS at 05:30

## 2022-08-12 RX ADMIN — Medication 650 MILLIGRAM(S): at 13:11

## 2022-08-12 RX ADMIN — Medication 12.5 MILLIGRAM(S): at 05:30

## 2022-08-12 RX ADMIN — Medication 12.5 MILLIGRAM(S): at 18:11

## 2022-08-12 RX ADMIN — Medication 667 MILLIGRAM(S): at 13:11

## 2022-08-12 RX ADMIN — Medication 3 UNIT(S): at 12:47

## 2022-08-12 RX ADMIN — Medication 3 UNIT(S): at 17:59

## 2022-08-12 RX ADMIN — Medication 650 MILLIGRAM(S): at 05:30

## 2022-08-12 RX ADMIN — TAMSULOSIN HYDROCHLORIDE 0.4 MILLIGRAM(S): 0.4 CAPSULE ORAL at 21:17

## 2022-08-12 RX ADMIN — Medication 81 MILLIGRAM(S): at 13:10

## 2022-08-12 RX ADMIN — HEPARIN SODIUM 5000 UNIT(S): 5000 INJECTION INTRAVENOUS; SUBCUTANEOUS at 18:10

## 2022-08-12 RX ADMIN — INSULIN GLARGINE 9 UNIT(S): 100 INJECTION, SOLUTION SUBCUTANEOUS at 21:17

## 2022-08-12 RX ADMIN — ATORVASTATIN CALCIUM 20 MILLIGRAM(S): 80 TABLET, FILM COATED ORAL at 21:17

## 2022-08-12 RX ADMIN — Medication 650 MILLIGRAM(S): at 21:17

## 2022-08-12 RX ADMIN — Medication 667 MILLIGRAM(S): at 18:09

## 2022-08-12 RX ADMIN — Medication 20 MILLIEQUIVALENT(S): at 08:59

## 2022-08-12 NOTE — PROGRESS NOTE ADULT - SUBJECTIVE AND OBJECTIVE BOX
Patient is a 71y old  Male who presents with a chief complaint of syncope (12 Aug 2022 12:02)    Date of servie : 08-12-22 @ 15:36  INTERVAL HPI/OVERNIGHT EVENTS:  T(C): 36.8 (08-12-22 @ 13:10), Max: 36.8 (08-12-22 @ 13:10)  HR: 65 (08-12-22 @ 13:10) (62 - 76)  BP: 114/62 (08-12-22 @ 13:10) (114/62 - 140/61)  RR: 18 (08-12-22 @ 13:10) (16 - 18)  SpO2: 98% (08-12-22 @ 13:10) (98% - 99%)  Wt(kg): --  I&O's Summary      LABS:                        11.2   7.87  )-----------( 183      ( 12 Aug 2022 06:11 )             34.1     08-12    140  |  104  |  48<H>  ----------------------------<  77  3.3<L>   |  21<L>  |  2.79<H>    Ca    9.4      12 Aug 2022 06:11  Phos  3.7     08-12  Mg     2.10     08-12    TPro  7.0  /  Alb  3.7  /  TBili  0.5  /  DBili  x   /  AST  20  /  ALT  17  /  AlkPhos  81  08-11        CAPILLARY BLOOD GLUCOSE      POCT Blood Glucose.: 144 mg/dL (12 Aug 2022 12:19)  POCT Blood Glucose.: 78 mg/dL (12 Aug 2022 08:43)  POCT Blood Glucose.: 148 mg/dL (11 Aug 2022 21:20)  POCT Blood Glucose.: 109 mg/dL (11 Aug 2022 17:47)            MEDICATIONS  (STANDING):  amLODIPine   Tablet 10 milliGRAM(s) Oral daily  aspirin  chewable 81 milliGRAM(s) Oral daily  atorvastatin 20 milliGRAM(s) Oral at bedtime  calcium acetate 667 milliGRAM(s) Oral three times a day with meals  dextrose 5%. 1000 milliLiter(s) (100 mL/Hr) IV Continuous <Continuous>  dextrose 5%. 1000 milliLiter(s) (50 mL/Hr) IV Continuous <Continuous>  dextrose 50% Injectable 25 Gram(s) IV Push once  dextrose 50% Injectable 12.5 Gram(s) IV Push once  dextrose 50% Injectable 25 Gram(s) IV Push once  glucagon  Injectable 1 milliGRAM(s) IntraMuscular once  heparin   Injectable 5000 Unit(s) SubCutaneous every 12 hours  insulin glargine Injectable (LANTUS) 9 Unit(s) SubCutaneous at bedtime  insulin lispro (ADMELOG) corrective regimen sliding scale   SubCutaneous three times a day before meals  insulin lispro Injectable (ADMELOG) 3 Unit(s) SubCutaneous three times a day before meals  metoprolol tartrate 12.5 milliGRAM(s) Oral two times a day  sodium bicarbonate 650 milliGRAM(s) Oral three times a day  tamsulosin 0.4 milliGRAM(s) Oral at bedtime    MEDICATIONS  (PRN):  dextrose Oral Gel 15 Gram(s) Oral once PRN Blood Glucose LESS THAN 70 milliGRAM(s)/deciliter          PHYSICAL EXAM:  GENERAL: NAD, well-groomed, well-developed  HEAD:  Atraumatic, Normocephalic  CHEST/LUNG: Clear to percussion bilaterally; No rales, rhonchi, wheezing, or rubs  HEART: Regular rate and rhythm; No murmurs, rubs, or gallops  ABDOMEN: Soft, Nontender, Nondistended; Bowel sounds present  EXTREMITIES:  2+ Peripheral Pulses, No clubbing, cyanosis, or edema  LYMPH: No lymphadenopathy noted  SKIN: No rashes or lesions    Care Discussed with Consultants/Other Providers [ ] YES  [ ] NO

## 2022-08-12 NOTE — PROGRESS NOTE ADULT - ASSESSMENT
70yo Citizen of [place] M with DM, HTN, HLD, and CKD who presented after 1 episode of syncope 1 day prior to presentation. He stated that he had walked down a flight of stairs and out of a building when he felt a sudden lightheadedness lasting maybe 30 seconds, associated with a trembling sensation in his upper extremities bilaterally, and fainted. no head trauma. + LOC, + incontinence, no tongue bite, a bit confused shortly after, now baseline. states dizzy when stands  CTH unremarkable   TTE as above  Impression: Syncope more likely than seizure.   - check orthostatics   - cardiac workup, TTE, tele  - c/w asa and statin therapy   - MRI brain seizure protocol. will also add MRA H/N  - vEEG pending  would get MRI first ;  can likely do EEG outpatient in ambulatory setting   - Hemoglobin A1c and lipid panel  - PT/OT--> no needs   - check FS, glucose control <180  - GI/DVT ppx  - Thank you for allowing me to participate in the care of this patient. Call with questions.   - spoke with ACP   Jason Rosales MD  Vascular Neurology  Office: [phone]

## 2022-08-12 NOTE — PROGRESS NOTE ADULT - SUBJECTIVE AND OBJECTIVE BOX
[facility]   MD VELASQUEZ PERDOMO MD KRISTINE SOLTANPOUR, DO ANGELA WONG, PA    TEL:  OFFICE: [phone]  From 5pm-7am Answering Service [phone]    -- RENAL FOLLOW UP NOTE ---Date of Service 08-12-22 @ 12:03    Patient is a 71y old  Male who presents with a chief complaint of syncope (12 Aug 2022 10:28)      Patient seen and examined at bedside. No chest pain/sob    VITALS:  T(F): 97.5 (08-12-22 @ 05:30), Max: 98.1 (08-11-22 @ 22:00)  HR: 76 (08-12-22 @ 05:30)  BP: 114/73 (08-12-22 @ 05:30)  RR: 16 (08-12-22 @ 05:30)  SpO2: 98% (08-12-22 @ 05:30)  Wt(kg): --      Weight (kg): 66.2 (08-11 @ 12:15)    PHYSICAL EXAM:  General: NAD  Neck: No JVD  Respiratory: CTAB, no wheezes, rales or rhonchi  Cardiovascular: S1, S2, RRR  Gastrointestinal: BS+, soft, NT/ND  Extremities: No peripheral edema    Hospital Medications:   MEDICATIONS  (STANDING):  amLODIPine   Tablet 10 milliGRAM(s) Oral daily  aspirin  chewable 81 milliGRAM(s) Oral daily  atorvastatin 20 milliGRAM(s) Oral at bedtime  calcium acetate 667 milliGRAM(s) Oral three times a day with meals  dextrose 5%. 1000 milliLiter(s) (100 mL/Hr) IV Continuous <Continuous>  dextrose 5%. 1000 milliLiter(s) (50 mL/Hr) IV Continuous <Continuous>  dextrose 50% Injectable 25 Gram(s) IV Push once  dextrose 50% Injectable 12.5 Gram(s) IV Push once  dextrose 50% Injectable 25 Gram(s) IV Push once  glucagon  Injectable 1 milliGRAM(s) IntraMuscular once  heparin   Injectable 5000 Unit(s) SubCutaneous every 12 hours  insulin glargine Injectable (LANTUS) 9 Unit(s) SubCutaneous at bedtime  insulin lispro (ADMELOG) corrective regimen sliding scale   SubCutaneous three times a day before meals  insulin lispro Injectable (ADMELOG) 3 Unit(s) SubCutaneous three times a day before meals  metoprolol tartrate 12.5 milliGRAM(s) Oral two times a day  sodium bicarbonate 650 milliGRAM(s) Oral three times a day  tamsulosin 0.4 milliGRAM(s) Oral at bedtime      LABS:  08-12    140  |  104  |  48<H>  ----------------------------<  77  3.3<L>   |  21<L>  |  2.79<H>    Ca    9.4      12 Aug 2022 06:11  Phos  3.7     08-12  Mg     2.10     08-12    TPro  7.0  /  Alb  3.7  /  TBili  0.5  /  DBili      /  AST  20  /  ALT  17  /  AlkPhos  81  08-11    Creatinine Trend: 2.79 <--, 2.68 <--, 2.95 <--, 2.80 <--    Phosphorus Level, Serum: 3.7 mg/dL (08-12 @ 06:11)                              11.2   7.87  )-----------( 183      ( 12 Aug 2022 06:11 )             34.1     Urine Studies:  Urinalysis - [08-10-22 @ 14:10]      Color Light Yellow / Appearance Clear / SG 1.015 / pH 6.0      Gluc >= 1000 mg/dL / Ketone Negative  / Bili Negative / Urobili <2 mg/dL       Blood Negative / Protein 100 mg/dL / Leuk Est Negative / Nitrite Negative      RBC 0 / WBC 0 / Hyaline 2 / Gran  / Sq Epi  / Non Sq Epi 0 / Bacteria Negative      HbA1c 7.3      [10-06-17 @ 05:30]    HCV 0.10, Nonreact      [08-11-22 @ 07:06]      RADIOLOGY & ADDITIONAL STUDIES:

## 2022-08-12 NOTE — PROGRESS NOTE ADULT - ASSESSMENT
71M w/hx CKD, HTN, HLD, DM, referred to the ED by his PCP (Dr. Steffen Rene) for recurrent syncope over the last week. Nephrology consulted for JUNI on CKD stage 4.     JUNI on CKD stage 4 vs ckd stage 4  baseline ~ 2.4-2.5 follows up with dr grace in office  last visit in 7/13 showed scr worsen to 2.88  scr fluctuating possible CKD  avoid nephrotoxic agents  monitor bmp    Proteinuria  known  vasculitis work up neg done in office  check urine p/c raito  off ACEI and ARB     HTN  controlled  monitor    Hypokalemia  supplemented  monitor    syncope  work up per team

## 2022-08-12 NOTE — PROGRESS NOTE ADULT - SUBJECTIVE AND OBJECTIVE BOX
Cardiovascular Disease Progress Note    Overnight events: No acute events overnight.   Patient denies chest pain or SOB.    Otherwise review of systems negative    Objective Findings:  T(C): 36.4 (08-12-22 @ 05:30), Max: 36.7 (08-11-22 @ 22:00)  HR: 76 (08-12-22 @ 05:30) (60 - 76)  BP: 114/73 (08-12-22 @ 05:30) (114/73 - 140/61)  RR: 16 (08-12-22 @ 05:30) (16 - 18)  SpO2: 98% (08-12-22 @ 05:30) (98% - 100%)  Wt(kg): --  Daily     Daily       Physical Exam:  Gen: NAD; Patient resting comfortably  HEENT: EOMI, Normocephalic/ atraumatic  CV: RRR, normal S1 + S2, no m/r/g  Lungs:  Normal respiratory effort; clear to auscultation bilaterally  Abd: soft, non-tender; bowel sounds present  Ext: No edema; warm and well perfused    Telemetry: Sinus; no ectopy    Laboratory Data:                        11.2   7.87  )-----------( 183      ( 12 Aug 2022 06:11 )             34.1     08-12    140  |  104  |  48<H>  ----------------------------<  77  3.3<L>   |  21<L>  |  2.79<H>    Ca    9.4      12 Aug 2022 06:11  Phos  3.7     08-12  Mg     2.10     08-12    TPro  7.0  /  Alb  3.7  /  TBili  0.5  /  DBili  x   /  AST  20  /  ALT  17  /  AlkPhos  81  08-11      CARDIAC MARKERS ( 10 Aug 2022 21:26 )  x     / x     / 62 U/L / x     / 1.5 ng/mL          Inpatient Medications:  MEDICATIONS  (STANDING):  amLODIPine   Tablet 10 milliGRAM(s) Oral daily  aspirin  chewable 81 milliGRAM(s) Oral daily  atorvastatin 20 milliGRAM(s) Oral at bedtime  calcium acetate 667 milliGRAM(s) Oral three times a day with meals  dextrose 5%. 1000 milliLiter(s) (100 mL/Hr) IV Continuous <Continuous>  dextrose 5%. 1000 milliLiter(s) (50 mL/Hr) IV Continuous <Continuous>  dextrose 50% Injectable 25 Gram(s) IV Push once  dextrose 50% Injectable 12.5 Gram(s) IV Push once  dextrose 50% Injectable 25 Gram(s) IV Push once  glucagon  Injectable 1 milliGRAM(s) IntraMuscular once  heparin   Injectable 5000 Unit(s) SubCutaneous every 12 hours  insulin glargine Injectable (LANTUS) 9 Unit(s) SubCutaneous at bedtime  insulin lispro (ADMELOG) corrective regimen sliding scale   SubCutaneous three times a day before meals  insulin lispro Injectable (ADMELOG) 3 Unit(s) SubCutaneous three times a day before meals  metoprolol tartrate 12.5 milliGRAM(s) Oral two times a day  sodium bicarbonate 650 milliGRAM(s) Oral three times a day  tamsulosin 0.4 milliGRAM(s) Oral at bedtime      Assessment: 71 year old man with IDDM, HTN, HLD, and CKD IV presents with syncope.     Plan of Care:    #Syncope-  Likely vasovagal etiology given the associated prodrome prior to passing out.  EKG is sinus and no ectopy noted on telemetry thus far.  Echocardiogram reviewed- mild LV dysfunction.  I would hold off on ischemic evaluation at this time given advanced renal disease not on HD.     #HTN-  BP is currently acceptable.    #CKD IV-  Renal input noted.         Over 25 minutes spent on total encounter; more than 50% of the visit was spent counseling and/or coordinating care by the attending physician.      Bc Kang MD [facility]  Cardiovascular Disease  [phone]

## 2022-08-12 NOTE — PROGRESS NOTE ADULT - ASSESSMENT
Problem/Plan - 1:  ·  Problem: Syncope and collapse.   ·  Plan: - Patient with 1 episode of sudden onset lightheadedness and dizziness, with b/l UE shaking, and followed by period of confusion; associated with urinary incontinence  - Most likely seizure activity – first occurrence  - CT head unremarkable  - Telemetry monitoring  - Check troponin  - ECG: sinus bradycardia, TWIs II, III, aVF, V5-V6  - Obtain TTE to evaluate for structural causes  - Obtain MRI seizure protocol  - Obtain video EEG  - Neuro checks q4h  - Potentially due to hypoglycemia – f/s qachs – though patient did not check f/s at the time of the event  - PT/OT  - if work-up nonrevealing, neurology consult.    Problem/Plan - 2:·  Problem: T2DM (type 2 diabetes mellitus).   ·  Plan: - Home regimen: insulin detemir 15U subq before lunch once daily; farxiga  - fingersticks qachs - goal 140-180  - start weight-based insulin with basal glargine qhs and pre-prandial lispro qac TID plus correction dose sliding scale  - advised patient to not take his own medications.    Problem/Plan - 3:  ·  Problem: Normocytic anemia.   ·  Plan: - Hb 11.5 on presentation – check ferritin, iron level, iron saturation, transferrin, TIBC  - Trend CBC for now  - Likely as a result of CKD.  Problem/Plan - 4:  ·  Problem: Hypokalemia.   ·  Plan: - K 3.4 on presentation, replenish as warranted.    Problem/Plan - 5:  ·  Problem: Stage 4 chronic kidney disease.   ·  Plan: - Cr 2.8 on presentation – prior known baseline 2.4-2.5 outpatient  - Progression vs JUNI  - Check urine Cr, Na, osm  - Trend BMP  - Continue home calcium acetate, sodium bicarbonate tabs  - Nephrology following.    Problem/Plan - 6:  ·  Problem: Elevated brain natriuretic peptide (BNP) level.   ·  Plan: - proBNP 715 on presentation – no known baseline  - CXR unremarkable.    Problem/Plan - 7:  ·  Problem: HTN (hypertension).   ·  Plan: - Goal normotension – resume home meds.  Problem/Plan - 8:  ·  Problem: Prophylactic measure.   ·  Plan: - lovenox.

## 2022-08-12 NOTE — PROGRESS NOTE ADULT - SUBJECTIVE AND OBJECTIVE BOX
Neurology Progress Note    S: Patient seen and examined. No new events overnight. patient denied CP, SOB, HA or pain. states mildly unsteady but better     Medication:  amLODIPine   Tablet 10 milliGRAM(s) Oral daily  aspirin  chewable 81 milliGRAM(s) Oral daily  atorvastatin 20 milliGRAM(s) Oral at bedtime  calcium acetate 667 milliGRAM(s) Oral three times a day with meals  dextrose 5%. 1000 milliLiter(s) IV Continuous <Continuous>  dextrose 5%. 1000 milliLiter(s) IV Continuous <Continuous>  dextrose 50% Injectable 25 Gram(s) IV Push once  dextrose 50% Injectable 12.5 Gram(s) IV Push once  dextrose 50% Injectable 25 Gram(s) IV Push once  dextrose Oral Gel 15 Gram(s) Oral once PRN  glucagon  Injectable 1 milliGRAM(s) IntraMuscular once  heparin   Injectable 5000 Unit(s) SubCutaneous every 12 hours  insulin glargine Injectable (LANTUS) 9 Unit(s) SubCutaneous at bedtime  insulin lispro (ADMELOG) corrective regimen sliding scale   SubCutaneous three times a day before meals  insulin lispro Injectable (ADMELOG) 3 Unit(s) SubCutaneous three times a day before meals  metoprolol tartrate 12.5 milliGRAM(s) Oral two times a day  sodium bicarbonate 650 milliGRAM(s) Oral three times a day  tamsulosin 0.4 milliGRAM(s) Oral at bedtime      Vitals:  Vital Signs Last 24 Hrs  T(C): 36.8 (12 Aug 2022 13:10), Max: 36.8 (12 Aug 2022 13:10)  T(F): 98.2 (12 Aug 2022 13:10), Max: 98.2 (12 Aug 2022 13:10)  HR: 65 (12 Aug 2022 13:10) (62 - 76)  BP: 114/62 (12 Aug 2022 13:10) (114/62 - 140/61)  BP(mean): --  RR: 18 (12 Aug 2022 13:10) (16 - 18)  SpO2: 98% (12 Aug 2022 13:10) (98% - 99%)    Parameters below as of 12 Aug 2022 13:10  Patient On (Oxygen Delivery Method): room air        General Exam:   General Appearance: Appropriately dressed and in no acute distress       Head: Normocephalic, atraumatic and no dysmorphic features  Ear, Nose, and Throat: Moist mucous membranes  CVS: S1S2+  Resp: No SOB, no wheeze or rhonchi  Abd: soft NTND  Extremities: No edema, no cyanosis  Skin: No bruises, no rashes     Neurological Exam:  Mental Status: Awake, alert and oriented x 3.  Able to follow simple and complex verbal commands. Able to name and repeat. fluent speech. No obvious aphasia or dysarthria noted.   Cranial Nerves: PERRL, EOMI, VFFC, sensation V1-V3 intact,  no obvious facial asymmetry, equal elevation of palate, scm/trap 5/5, tongue is midline on protrusion. no obvious papilledema on fundoscopic exam. hearing is grossly intact.   Motor: Normal bulk, tone and strength throughout. Fine finger movements were intact and symmetric. no tremors or drift noted.    Sensation: Intact to light touch and pinprick throughout. no right/left confusion. no extinction to tactile on DSS.   Reflexes: 1+ throughout at biceps, brachioradialis, triceps, patellars and ankles bilaterally and equal. No clonus. R toe and L toe were both downgoing.  Coordination: No dysmetria on FNF or HKS  Gait:deferred     I personally reviewed the below data/images/labs:      CBC Full  -  ( 12 Aug 2022 06:11 )  WBC Count : 7.87 K/uL  RBC Count : 3.97 M/uL  Hemoglobin : 11.2 g/dL  Hematocrit : 34.1 %  Platelet Count - Automated : 183 K/uL  Mean Cell Volume : 85.9 fL  Mean Cell Hemoglobin : 28.2 pg  Mean Cell Hemoglobin Concentration : 32.8 gm/dL  Auto Neutrophil # : 4.35 K/uL  Auto Lymphocyte # : 2.38 K/uL  Auto Monocyte # : 0.79 K/uL  Auto Eosinophil # : 0.29 K/uL  Auto Basophil # : 0.04 K/uL  Auto Neutrophil % : 55.3 %  Auto Lymphocyte % : 30.2 %  Auto Monocyte % : 10.0 %  Auto Eosinophil % : 3.7 %  Auto Basophil % : 0.5 %    08-12    140  |  104  |  48<H>  ----------------------------<  77  3.3<L>   |  21<L>  |  2.79<H>    Ca    9.4      12 Aug 2022 06:11  Phos  3.7     08-12  Mg     2.10     08-12    TPro  7.0  /  Alb  3.7  /  TBili  0.5  /  DBili  x   /  AST  20  /  ALT  17  /  AlkPhos  81  08-11    LIVER FUNCTIONS - ( 11 Aug 2022 07:06 )  Alb: 3.7 g/dL / Pro: 7.0 g/dL / ALK PHOS: 81 U/L / ALT: 17 U/L / AST: 20 U/L / GGT: x               < from: CT Head No Cont (08.10.22 @ 14:29) >    ACC: [id number] EXAM:  CT BRAIN                          PROCEDURE DATE:  08/10/2022          INTERPRETATION:  Clinical indication: Syncope.    Multiple axial sections were performed from base of skull to vertex   without contrast enhancement coronaland sagittal reconstructions were   performed as well.    Parenchymal volume loss and chronic microvascular ischemic changes are   identified    There is no acute hemorrhage mass or mass effect seen.    Evaluation of the osseous structures with the appropriate window appears   unremarkable    The visualized paranasal sinuses mastoid and middle ear regions appear   clear.    IMPRESSION: No evidence of acute hemorrhage, mass or mass effect.    --- End of Report ---      Orthostatic VS  < from: Transthoracic Echocardiogram (08.11.22 @ 15:27) >    Patient name: RAJEEV WEISS  YOB: 1950   Age: 71 (M)   MR#: [id number]  Study Date: 8/11/2022  Location: [address] [place] [address] off tele Sonographer: CELESTE Ortega  Study quality: Technically good  Referring Physician: Em Hernandez MD  Blood Pressure: 150/75 mmHg  Height: 162 cm  Weight: 62 kg  BSA: 1.7 m2  ------------------------------------------------------------------------  PROCEDURE: Transthoracic echocardiogram with 2-D, M-Mode  and complete spectral and color flow Doppler.  INDICATION: Syncope and collapse (R55)  ------------------------------------------------------------------------  DIMENSIONS:  Dimensions:     Normal Values:  LA:     3.5 cm    2.0 - 4.0 cm  Ao:     3.5 cm    2.0 - 3.8 cm  SEPTUM: 0.7 cm    0.6 - 1.2 cm  PWT:    0.7 cm    0.6 - 1.1 cm  LVIDd:  4.8 cm    3.0 - 5.6 cm  LVIDs:  3.3 cm    1.8 - 4.0 cm  Derived Variables:  LVMI: 64 g/m2  RWT: 0.29  Fractional short: 31 %  Ejection Fraction (Modified Garcia Rule): 50 %  ------------------------------------------------------------------------  OBSERVATIONS:  Mitral Valve: Mitral annular calcification, otherwise  normal mitral valve. Mild mitral regurgitation.  Aortic Root: Normal aortic root.  Aortic Valve: Calcified trileaflet aortic valve with normal  opening.  Left Atrium: Mildly dilated left atrium.  LA volume index =  35 cc/m2.  Left Ventricle: Mild global left ventricular systolic  dysfunction. Normal left ventricular internal dimensions  and wall thicknesses. Mild diastolic dysfunction (Stage I).  Right Heart: Normal right atrium. Normal right ventricular  size and function. Normal tricuspid valve.  Minimal  tricuspid regurgitation. Normal pulmonic valve.  Pericardium/PleuraNormal pericardium with no pericardial  effusion.  ------------------------------------------------------------------------  CONCLUSIONS:  1. Mitral annular calcification, otherwise normal mitral  valve. Mild mitral regurgitation.  2. Mildly dilated left atrium.  LA volume index = 35 cc/m2.  3. Normal left ventricular internal dimensions and wall  thicknesses.  4. Mild global left ventricular systolic dysfunction.  5. Mild diastolic dysfunction (Stage I).  6. Normal right ventricular size and function.  ------------------------------------------------------------------------  Confirmed on  8/11/2022 - 16:52:40 by Edgard French M.D.,  [facility], [facility]  ------------------------------------------------------------------------    < end of copied text >

## 2022-08-13 LAB
ANION GAP SERPL CALC-SCNC: 15 MMOL/L — HIGH (ref 7–14)
BUN SERPL-MCNC: 50 MG/DL — HIGH (ref 7–23)
CALCIUM SERPL-MCNC: 9.1 MG/DL — SIGNIFICANT CHANGE UP (ref 8.4–10.5)
CHLORIDE SERPL-SCNC: 106 MMOL/L — SIGNIFICANT CHANGE UP (ref 98–107)
CO2 SERPL-SCNC: 19 MMOL/L — LOW (ref 22–31)
CREAT SERPL-MCNC: 2.8 MG/DL — HIGH (ref 0.5–1.3)
EGFR: 23 ML/MIN/1.73M2 — LOW
GLUCOSE BLDC GLUCOMTR-MCNC: 123 MG/DL — HIGH (ref 70–99)
GLUCOSE BLDC GLUCOMTR-MCNC: 82 MG/DL — SIGNIFICANT CHANGE UP (ref 70–99)
GLUCOSE BLDC GLUCOMTR-MCNC: 93 MG/DL — SIGNIFICANT CHANGE UP (ref 70–99)
GLUCOSE BLDC GLUCOMTR-MCNC: 94 MG/DL — SIGNIFICANT CHANGE UP (ref 70–99)
GLUCOSE SERPL-MCNC: 81 MG/DL — SIGNIFICANT CHANGE UP (ref 70–99)
HCT VFR BLD CALC: 30.5 % — LOW (ref 39–50)
HGB BLD-MCNC: 10.1 G/DL — LOW (ref 13–17)
MAGNESIUM SERPL-MCNC: 2.2 MG/DL — SIGNIFICANT CHANGE UP (ref 1.6–2.6)
MCHC RBC-ENTMCNC: 28 PG — SIGNIFICANT CHANGE UP (ref 27–34)
MCHC RBC-ENTMCNC: 33.1 GM/DL — SIGNIFICANT CHANGE UP (ref 32–36)
MCV RBC AUTO: 84.5 FL — SIGNIFICANT CHANGE UP (ref 80–100)
NRBC # BLD: 0 /100 WBCS — SIGNIFICANT CHANGE UP
NRBC # FLD: 0 K/UL — SIGNIFICANT CHANGE UP
PHOSPHATE SERPL-MCNC: 4 MG/DL — SIGNIFICANT CHANGE UP (ref 2.5–4.5)
PLATELET # BLD AUTO: 172 K/UL — SIGNIFICANT CHANGE UP (ref 150–400)
POTASSIUM SERPL-MCNC: 3.5 MMOL/L — SIGNIFICANT CHANGE UP (ref 3.5–5.3)
POTASSIUM SERPL-SCNC: 3.5 MMOL/L — SIGNIFICANT CHANGE UP (ref 3.5–5.3)
RBC # BLD: 3.61 M/UL — LOW (ref 4.2–5.8)
RBC # FLD: 14.1 % — SIGNIFICANT CHANGE UP (ref 10.3–14.5)
SODIUM SERPL-SCNC: 140 MMOL/L — SIGNIFICANT CHANGE UP (ref 135–145)
WBC # BLD: 6.66 K/UL — SIGNIFICANT CHANGE UP (ref 3.8–10.5)
WBC # FLD AUTO: 6.66 K/UL — SIGNIFICANT CHANGE UP (ref 3.8–10.5)

## 2022-08-13 RX ADMIN — AMLODIPINE BESYLATE 10 MILLIGRAM(S): 2.5 TABLET ORAL at 05:21

## 2022-08-13 RX ADMIN — Medication 667 MILLIGRAM(S): at 17:28

## 2022-08-13 RX ADMIN — Medication 650 MILLIGRAM(S): at 13:24

## 2022-08-13 RX ADMIN — HEPARIN SODIUM 5000 UNIT(S): 5000 INJECTION INTRAVENOUS; SUBCUTANEOUS at 17:25

## 2022-08-13 RX ADMIN — Medication 3 UNIT(S): at 13:19

## 2022-08-13 RX ADMIN — Medication 3 UNIT(S): at 09:25

## 2022-08-13 RX ADMIN — INSULIN GLARGINE 9 UNIT(S): 100 INJECTION, SOLUTION SUBCUTANEOUS at 21:20

## 2022-08-13 RX ADMIN — HEPARIN SODIUM 5000 UNIT(S): 5000 INJECTION INTRAVENOUS; SUBCUTANEOUS at 05:21

## 2022-08-13 RX ADMIN — Medication 650 MILLIGRAM(S): at 05:21

## 2022-08-13 RX ADMIN — Medication 12.5 MILLIGRAM(S): at 17:28

## 2022-08-13 RX ADMIN — Medication 650 MILLIGRAM(S): at 21:22

## 2022-08-13 RX ADMIN — Medication 667 MILLIGRAM(S): at 13:24

## 2022-08-13 RX ADMIN — Medication 12.5 MILLIGRAM(S): at 05:21

## 2022-08-13 RX ADMIN — Medication 667 MILLIGRAM(S): at 09:24

## 2022-08-13 RX ADMIN — TAMSULOSIN HYDROCHLORIDE 0.4 MILLIGRAM(S): 0.4 CAPSULE ORAL at 21:22

## 2022-08-13 RX ADMIN — Medication 3 UNIT(S): at 18:04

## 2022-08-13 RX ADMIN — Medication 81 MILLIGRAM(S): at 13:22

## 2022-08-13 RX ADMIN — ATORVASTATIN CALCIUM 20 MILLIGRAM(S): 80 TABLET, FILM COATED ORAL at 21:22

## 2022-08-13 NOTE — PROGRESS NOTE ADULT - SUBJECTIVE AND OBJECTIVE BOX
Cardiovascular Disease Progress Note    Overnight events: No acute events overnight.  Patient denies chest pain or SOB.     Otherwise review of systems negative    Objective Findings:  T(C): 36.7 (08-13-22 @ 05:15), Max: 36.9 (08-12-22 @ 18:00)  HR: 70 (08-13-22 @ 05:15) (60 - 70)  BP: 140/71 (08-13-22 @ 05:15) (114/62 - 140/71)  RR: 16 (08-13-22 @ 05:15) (16 - 18)  SpO2: 98% (08-13-22 @ 05:15) (98% - 99%)  Wt(kg): --  Daily     Daily       Physical Exam:  Gen: NAD; Patient resting comfortably  HEENT: EOMI, Normocephalic/ atraumatic  CV: RRR, normal S1 + S2, no m/r/g  Lungs:  Normal respiratory effort; clear to auscultation bilaterally  Abd: soft, non-tender; bowel sounds present  Ext: No edema; warm and well perfused    Telemetry: Sinus; no ectopy    Laboratory Data:                        10.1   6.66  )-----------( 172      ( 13 Aug 2022 07:16 )             30.5     08-12    140  |  104  |  48<H>  ----------------------------<  77  3.3<L>   |  21<L>  |  2.79<H>    Ca    9.4      12 Aug 2022 06:11  Phos  3.7     08-12  Mg     2.10     08-12                Inpatient Medications:  MEDICATIONS  (STANDING):  amLODIPine   Tablet 10 milliGRAM(s) Oral daily  aspirin  chewable 81 milliGRAM(s) Oral daily  atorvastatin 20 milliGRAM(s) Oral at bedtime  calcium acetate 667 milliGRAM(s) Oral three times a day with meals  dextrose 5%. 1000 milliLiter(s) (100 mL/Hr) IV Continuous <Continuous>  dextrose 5%. 1000 milliLiter(s) (50 mL/Hr) IV Continuous <Continuous>  dextrose 50% Injectable 25 Gram(s) IV Push once  dextrose 50% Injectable 12.5 Gram(s) IV Push once  dextrose 50% Injectable 25 Gram(s) IV Push once  glucagon  Injectable 1 milliGRAM(s) IntraMuscular once  heparin   Injectable 5000 Unit(s) SubCutaneous every 12 hours  insulin glargine Injectable (LANTUS) 9 Unit(s) SubCutaneous at bedtime  insulin lispro (ADMELOG) corrective regimen sliding scale   SubCutaneous three times a day before meals  insulin lispro Injectable (ADMELOG) 3 Unit(s) SubCutaneous three times a day before meals  metoprolol tartrate 12.5 milliGRAM(s) Oral two times a day  sodium bicarbonate 650 milliGRAM(s) Oral three times a day  tamsulosin 0.4 milliGRAM(s) Oral at bedtime      Assessment: 71 year old man with IDDM, HTN, HLD, and CKD IV presents with syncope.     Plan of Care:    #Syncope-  Likely vasovagal etiology given the associated prodrome prior to passing out.  EKG is sinus and no ectopy noted on telemetry thus far.  Echocardiogram reviewed- mild LV dysfunction.  I would hold off on ischemic evaluation at this time given advanced renal disease not on HD.     #HTN-  BP is currently acceptable.    #CKD IV-  Renal input noted.       Over 25 minutes spent on total encounter; more than 50% of the visit was spent counseling and/or coordinating care by the attending physician.      Bc Kang MD [facility]  Cardiovascular Disease  [phone]

## 2022-08-13 NOTE — PROGRESS NOTE ADULT - SUBJECTIVE AND OBJECTIVE BOX
[facility] [place] [facility]   MD VELASQUEZ PERDOMO MD KRISTINE SOLTANPOUR, DO ANGELA WONG, PA    TEL:  OFFICE: [phone]  From 5pm-7am Answering Service [phone]    -- RENAL FOLLOW UP NOTE ---Date of Service 08-13-22 @ 12:27    Patient is a 71y old  Male who presents with a chief complaint of syncope (13 Aug 2022 08:21)      Patient seen and examined at bedside. No chest pain/sob    VITALS:  T(F): 98 (08-13-22 @ 05:15), Max: 98.4 (08-12-22 @ 18:00)  HR: 70 (08-13-22 @ 05:15)  BP: 140/71 (08-13-22 @ 05:15)  RR: 16 (08-13-22 @ 05:15)  SpO2: 98% (08-13-22 @ 05:15)  Wt(kg): --        PHYSICAL EXAM:  General: NAD  Neck: No JVD  Respiratory: CTAB, no wheezes, rales or rhonchi  Cardiovascular: S1, S2, RRR  Gastrointestinal: BS+, soft, NT/ND  Extremities: No peripheral edema    Hospital Medications:   MEDICATIONS  (STANDING):  amLODIPine   Tablet 10 milliGRAM(s) Oral daily  aspirin  chewable 81 milliGRAM(s) Oral daily  atorvastatin 20 milliGRAM(s) Oral at bedtime  calcium acetate 667 milliGRAM(s) Oral three times a day with meals  dextrose 5%. 1000 milliLiter(s) (100 mL/Hr) IV Continuous <Continuous>  dextrose 5%. 1000 milliLiter(s) (50 mL/Hr) IV Continuous <Continuous>  dextrose 50% Injectable 25 Gram(s) IV Push once  dextrose 50% Injectable 12.5 Gram(s) IV Push once  dextrose 50% Injectable 25 Gram(s) IV Push once  glucagon  Injectable 1 milliGRAM(s) IntraMuscular once  heparin   Injectable 5000 Unit(s) SubCutaneous every 12 hours  insulin glargine Injectable (LANTUS) 9 Unit(s) SubCutaneous at bedtime  insulin lispro (ADMELOG) corrective regimen sliding scale   SubCutaneous three times a day before meals  insulin lispro Injectable (ADMELOG) 3 Unit(s) SubCutaneous three times a day before meals  metoprolol tartrate 12.5 milliGRAM(s) Oral two times a day  sodium bicarbonate 650 milliGRAM(s) Oral three times a day  tamsulosin 0.4 milliGRAM(s) Oral at bedtime      LABS:  08-13    140  |  106  |  50<H>  ----------------------------<  81  3.5   |  19<L>  |  2.80<H>    Ca    9.1      13 Aug 2022 07:16  Phos  4.0     08-13  Mg     2.20     08-13      Creatinine Trend: 2.80 <--, 2.79 <--, 2.68 <--, 2.95 <--, 2.80 <--    Phosphorus Level, Serum: 4.0 mg/dL (08-13 @ 07:16)                              10.1   6.66  )-----------( 172      ( 13 Aug 2022 07:16 )             30.5     Urine Studies:  Urinalysis - [08-10-22 @ 14:10]      Color Light Yellow / Appearance Clear / SG 1.015 / pH 6.0      Gluc >= 1000 mg/dL / Ketone Negative  / Bili Negative / Urobili <2 mg/dL       Blood Negative / Protein 100 mg/dL / Leuk Est Negative / Nitrite Negative      RBC 0 / WBC 0 / Hyaline 2 / Gran  / Sq Epi  / Non Sq Epi 0 / Bacteria Negative      HbA1c 7.3      [10-06-17 @ 05:30]    HCV 0.10, Nonreact      [08-11-22 @ 07:06]      RADIOLOGY & ADDITIONAL STUDIES:

## 2022-08-13 NOTE — PROGRESS NOTE ADULT - ASSESSMENT
71M w/hx CKD, HTN, HLD, DM, referred to the ED by his PCP (Dr. Steffen Rene) for recurrent syncope over the last week. Nephrology consulted for UJNI on CKD stage 4.     JUNI on CKD stage 4 vs ckd stage 4  baseline ~ 2.4-2.5 follows up with dr grace in office  last visit in 7/13 showed scr worsen to 2.88  scr fluctuating likely CKD  avoid nephrotoxic agents  monitor bmp    Proteinuria  known  vasculitis work up neg done in office  check urine p/c raito  off ACEI and ARB     HTN  controlled  monitor    Hypokalemia  supplemented  monitor    syncope  work up per team

## 2022-08-13 NOTE — PROGRESS NOTE ADULT - SUBJECTIVE AND OBJECTIVE BOX
Patient is a 71y old  Male who presents with a chief complaint of syncope (13 Aug 2022 12:27)    Date of servie : 08-13-22 @ 18:58  INTERVAL HPI/OVERNIGHT EVENTS:  T(C): 36.9 (08-13-22 @ 17:28), Max: 36.9 (08-13-22 @ 13:15)  HR: 66 (08-13-22 @ 17:28) (60 - 70)  BP: 123/58 (08-13-22 @ 17:28) (123/58 - 140/71)  RR: 18 (08-13-22 @ 17:28) (16 - 18)  SpO2: 99% (08-13-22 @ 17:28) (98% - 99%)  Wt(kg): --  I&O's Summary      LABS:                        10.1   6.66  )-----------( 172      ( 13 Aug 2022 07:16 )             30.5     08-13    140  |  106  |  50<H>  ----------------------------<  81  3.5   |  19<L>  |  2.80<H>    Ca    9.1      13 Aug 2022 07:16  Phos  4.0     08-13  Mg     2.20     08-13          CAPILLARY BLOOD GLUCOSE      POCT Blood Glucose.: 94 mg/dL (13 Aug 2022 17:38)  POCT Blood Glucose.: 93 mg/dL (13 Aug 2022 13:07)  POCT Blood Glucose.: 82 mg/dL (13 Aug 2022 08:40)  POCT Blood Glucose.: 134 mg/dL (12 Aug 2022 21:31)            MEDICATIONS  (STANDING):  amLODIPine   Tablet 10 milliGRAM(s) Oral daily  aspirin  chewable 81 milliGRAM(s) Oral daily  atorvastatin 20 milliGRAM(s) Oral at bedtime  calcium acetate 667 milliGRAM(s) Oral three times a day with meals  dextrose 5%. 1000 milliLiter(s) (100 mL/Hr) IV Continuous <Continuous>  dextrose 5%. 1000 milliLiter(s) (50 mL/Hr) IV Continuous <Continuous>  dextrose 50% Injectable 25 Gram(s) IV Push once  dextrose 50% Injectable 12.5 Gram(s) IV Push once  dextrose 50% Injectable 25 Gram(s) IV Push once  glucagon  Injectable 1 milliGRAM(s) IntraMuscular once  heparin   Injectable 5000 Unit(s) SubCutaneous every 12 hours  insulin glargine Injectable (LANTUS) 9 Unit(s) SubCutaneous at bedtime  insulin lispro (ADMELOG) corrective regimen sliding scale   SubCutaneous three times a day before meals  insulin lispro Injectable (ADMELOG) 3 Unit(s) SubCutaneous three times a day before meals  metoprolol tartrate 12.5 milliGRAM(s) Oral two times a day  sodium bicarbonate 650 milliGRAM(s) Oral three times a day  tamsulosin 0.4 milliGRAM(s) Oral at bedtime    MEDICATIONS  (PRN):  dextrose Oral Gel 15 Gram(s) Oral once PRN Blood Glucose LESS THAN 70 milliGRAM(s)/deciliter          PHYSICAL EXAM:  GENERAL: NAD, well-groomed, well-developed  HEAD:  Atraumatic, Normocephalic  CHEST/LUNG: Clear to percussion bilaterally; No rales, rhonchi, wheezing, or rubs  HEART: Regular rate and rhythm; No murmurs, rubs, or gallops  ABDOMEN: Soft, Nontender, Nondistended; Bowel sounds present  EXTREMITIES:  2+ Peripheral Pulses, No clubbing, cyanosis, or edema  LYMPH: No lymphadenopathy noted  SKIN: No rashes or lesions    Care Discussed with Consultants/Other Providers [ ] YES  [ ] NO

## 2022-08-13 NOTE — PROGRESS NOTE ADULT - ASSESSMENT
Problem/Plan - 1:  ·  Problem: Syncope and collapse.   ·  Plan: - Patient with 1 episode of sudden onset lightheadedness and dizziness, with b/l UE shaking, and followed by period of confusion; associated with urinary incontinence  - Most likely seizure activity   – first occurrence  - Obtain video EEG  - Neuro checks q4h  - neuro fu     Problem/Plan - 2:·  Problem: T2DM (type 2 diabetes mellitus).   ·  Plan: - Home regimen: insulin detemir 15U subq before lunch once daily; farxiga  - fingersticks qachs - goal 140-180  - start weight-based insulin with basal glargine qhs and pre-prandial lispro qac TID plus correction dose sliding scale  Problem/Plan - 3:  ·  Problem: Normocytic anemia.   ·  Plan: - Hb 11.5 on presentation – check ferritin, iron level, iron saturation, transferrin, TIBC  - Trend CBC for now  - Likely as a result of CKD.  Problem/Plan - 4:  ·  Problem: Hypokalemia.   ·  Plan: - K 3.4 on presentation, replenish as warranted.    Problem/Plan - 5:  ·  Problem: Stage 4 chronic kidney disease.   ·  Plan: - Cr 2.8 on presentation – prior known baseline 2.4-2.5 outpatient  - Progression vs JUNI  - Continue home calcium acetate, sodium bicarbonate tabs  - Nephrology following.    Problem/Plan - 6:  ·  Problem: Elevated brain natriuretic peptide (BNP) level.   ·  Plan: - proBNP 715 on presentation – no known baseline  - CXR unremarkable.    Problem/Plan - 7:  ·  Problem: HTN (hypertension).   ·  Plan: - Goal normotension – resume home meds.  Problem/Plan - 8:  ·  Problem: Prophylactic measure.   ·  Plan: - lovenox.

## 2022-08-14 LAB
ANION GAP SERPL CALC-SCNC: 13 MMOL/L — SIGNIFICANT CHANGE UP (ref 7–14)
BUN SERPL-MCNC: 44 MG/DL — HIGH (ref 7–23)
CALCIUM SERPL-MCNC: 9.2 MG/DL — SIGNIFICANT CHANGE UP (ref 8.4–10.5)
CHLORIDE SERPL-SCNC: 104 MMOL/L — SIGNIFICANT CHANGE UP (ref 98–107)
CO2 SERPL-SCNC: 21 MMOL/L — LOW (ref 22–31)
CREAT SERPL-MCNC: 2.74 MG/DL — HIGH (ref 0.5–1.3)
EGFR: 24 ML/MIN/1.73M2 — LOW
GLUCOSE BLDC GLUCOMTR-MCNC: 100 MG/DL — HIGH (ref 70–99)
GLUCOSE BLDC GLUCOMTR-MCNC: 143 MG/DL — HIGH (ref 70–99)
GLUCOSE BLDC GLUCOMTR-MCNC: 168 MG/DL — HIGH (ref 70–99)
GLUCOSE BLDC GLUCOMTR-MCNC: 71 MG/DL — SIGNIFICANT CHANGE UP (ref 70–99)
GLUCOSE BLDC GLUCOMTR-MCNC: 92 MG/DL — SIGNIFICANT CHANGE UP (ref 70–99)
GLUCOSE SERPL-MCNC: 75 MG/DL — SIGNIFICANT CHANGE UP (ref 70–99)
HCT VFR BLD CALC: 31.7 % — LOW (ref 39–50)
HGB BLD-MCNC: 10.5 G/DL — LOW (ref 13–17)
MAGNESIUM SERPL-MCNC: 2.3 MG/DL — SIGNIFICANT CHANGE UP (ref 1.6–2.6)
MCHC RBC-ENTMCNC: 28.1 PG — SIGNIFICANT CHANGE UP (ref 27–34)
MCHC RBC-ENTMCNC: 33.1 GM/DL — SIGNIFICANT CHANGE UP (ref 32–36)
MCV RBC AUTO: 84.8 FL — SIGNIFICANT CHANGE UP (ref 80–100)
NRBC # BLD: 0 /100 WBCS — SIGNIFICANT CHANGE UP
NRBC # FLD: 0 K/UL — SIGNIFICANT CHANGE UP
PHOSPHATE SERPL-MCNC: 3.2 MG/DL — SIGNIFICANT CHANGE UP (ref 2.5–4.5)
PLATELET # BLD AUTO: 178 K/UL — SIGNIFICANT CHANGE UP (ref 150–400)
POTASSIUM SERPL-MCNC: 3.6 MMOL/L — SIGNIFICANT CHANGE UP (ref 3.5–5.3)
POTASSIUM SERPL-SCNC: 3.6 MMOL/L — SIGNIFICANT CHANGE UP (ref 3.5–5.3)
RBC # BLD: 3.74 M/UL — LOW (ref 4.2–5.8)
RBC # FLD: 14.1 % — SIGNIFICANT CHANGE UP (ref 10.3–14.5)
SODIUM SERPL-SCNC: 138 MMOL/L — SIGNIFICANT CHANGE UP (ref 135–145)
WBC # BLD: 7.06 K/UL — SIGNIFICANT CHANGE UP (ref 3.8–10.5)
WBC # FLD AUTO: 7.06 K/UL — SIGNIFICANT CHANGE UP (ref 3.8–10.5)

## 2022-08-14 RX ADMIN — Medication 3 UNIT(S): at 12:50

## 2022-08-14 RX ADMIN — INSULIN GLARGINE 9 UNIT(S): 100 INJECTION, SOLUTION SUBCUTANEOUS at 21:13

## 2022-08-14 RX ADMIN — Medication 3 UNIT(S): at 18:00

## 2022-08-14 RX ADMIN — Medication 12.5 MILLIGRAM(S): at 18:01

## 2022-08-14 RX ADMIN — Medication 12.5 MILLIGRAM(S): at 05:19

## 2022-08-14 RX ADMIN — HEPARIN SODIUM 5000 UNIT(S): 5000 INJECTION INTRAVENOUS; SUBCUTANEOUS at 05:18

## 2022-08-14 RX ADMIN — Medication 667 MILLIGRAM(S): at 09:57

## 2022-08-14 RX ADMIN — Medication 650 MILLIGRAM(S): at 05:19

## 2022-08-14 RX ADMIN — Medication 650 MILLIGRAM(S): at 21:14

## 2022-08-14 RX ADMIN — Medication 667 MILLIGRAM(S): at 18:01

## 2022-08-14 RX ADMIN — Medication 650 MILLIGRAM(S): at 14:20

## 2022-08-14 RX ADMIN — AMLODIPINE BESYLATE 10 MILLIGRAM(S): 2.5 TABLET ORAL at 05:19

## 2022-08-14 RX ADMIN — TAMSULOSIN HYDROCHLORIDE 0.4 MILLIGRAM(S): 0.4 CAPSULE ORAL at 21:14

## 2022-08-14 RX ADMIN — Medication 667 MILLIGRAM(S): at 12:51

## 2022-08-14 RX ADMIN — ATORVASTATIN CALCIUM 20 MILLIGRAM(S): 80 TABLET, FILM COATED ORAL at 21:14

## 2022-08-14 RX ADMIN — HEPARIN SODIUM 5000 UNIT(S): 5000 INJECTION INTRAVENOUS; SUBCUTANEOUS at 18:01

## 2022-08-14 RX ADMIN — Medication 81 MILLIGRAM(S): at 12:51

## 2022-08-14 NOTE — PROGRESS NOTE ADULT - SUBJECTIVE AND OBJECTIVE BOX
Patient is a 71y old  Male who presents with a chief complaint of syncope (14 Aug 2022 08:48)    Date of servie : 08-14-22 @ 14:48  INTERVAL HPI/OVERNIGHT EVENTS:  T(C): 36.6 (08-14-22 @ 13:15), Max: 36.9 (08-13-22 @ 17:28)  HR: 77 (08-14-22 @ 13:15) (66 - 77)  BP: 129/70 (08-14-22 @ 13:15) (123/58 - 150/71)  RR: 18 (08-14-22 @ 13:15) (15 - 18)  SpO2: 99% (08-14-22 @ 13:15) (99% - 99%)  Wt(kg): --  I&O's Summary      LABS:                        10.5   7.06  )-----------( 178      ( 14 Aug 2022 05:40 )             31.7     08-14    138  |  104  |  44<H>  ----------------------------<  75  3.6   |  21<L>  |  2.74<H>    Ca    9.2      14 Aug 2022 05:40  Phos  3.2     08-14  Mg     2.30     08-14          CAPILLARY BLOOD GLUCOSE      POCT Blood Glucose.: 100 mg/dL (14 Aug 2022 12:09)  POCT Blood Glucose.: 92 mg/dL (14 Aug 2022 08:51)  POCT Blood Glucose.: 71 mg/dL (14 Aug 2022 08:29)  POCT Blood Glucose.: 123 mg/dL (13 Aug 2022 22:15)  POCT Blood Glucose.: 94 mg/dL (13 Aug 2022 17:38)            MEDICATIONS  (STANDING):  amLODIPine   Tablet 10 milliGRAM(s) Oral daily  aspirin  chewable 81 milliGRAM(s) Oral daily  atorvastatin 20 milliGRAM(s) Oral at bedtime  calcium acetate 667 milliGRAM(s) Oral three times a day with meals  dextrose 5%. 1000 milliLiter(s) (100 mL/Hr) IV Continuous <Continuous>  dextrose 5%. 1000 milliLiter(s) (50 mL/Hr) IV Continuous <Continuous>  dextrose 50% Injectable 25 Gram(s) IV Push once  dextrose 50% Injectable 12.5 Gram(s) IV Push once  dextrose 50% Injectable 25 Gram(s) IV Push once  glucagon  Injectable 1 milliGRAM(s) IntraMuscular once  heparin   Injectable 5000 Unit(s) SubCutaneous every 12 hours  insulin glargine Injectable (LANTUS) 9 Unit(s) SubCutaneous at bedtime  insulin lispro (ADMELOG) corrective regimen sliding scale   SubCutaneous three times a day before meals  insulin lispro Injectable (ADMELOG) 3 Unit(s) SubCutaneous three times a day before meals  metoprolol tartrate 12.5 milliGRAM(s) Oral two times a day  sodium bicarbonate 650 milliGRAM(s) Oral three times a day  tamsulosin 0.4 milliGRAM(s) Oral at bedtime    MEDICATIONS  (PRN):  dextrose Oral Gel 15 Gram(s) Oral once PRN Blood Glucose LESS THAN 70 milliGRAM(s)/deciliter          PHYSICAL EXAM:  GENERAL: NAD, well-groomed, well-developed  HEAD:  Atraumatic, Normocephalic  CHEST/LUNG: Clear to percussion bilaterally; No rales, rhonchi, wheezing, or rubs  HEART: Regular rate and rhythm; No murmurs, rubs, or gallops  ABDOMEN: Soft, Nontender, Nondistended; Bowel sounds present  EXTREMITIES:  2+ Peripheral Pulses, No clubbing, cyanosis, or edema  LYMPH: No lymphadenopathy noted  SKIN: No rashes or lesions    Care Discussed with Consultants/Other Providers [ ] YES  [ ] NO

## 2022-08-14 NOTE — PROGRESS NOTE ADULT - SUBJECTIVE AND OBJECTIVE BOX
Cardiovascular Disease Progress Note    Overnight events: No acute events overnight.    Patient denies chest pain or SOB.   Otherwise review of systems negative    Objective Findings:  T(C): 36.6 (08-14-22 @ 05:15), Max: 36.9 (08-13-22 @ 13:15)  HR: 71 (08-14-22 @ 05:15) (65 - 71)  BP: 150/71 (08-14-22 @ 05:15) (123/58 - 150/71)  RR: 15 (08-14-22 @ 05:15) (15 - 18)  SpO2: 99% (08-14-22 @ 05:15) (99% - 99%)  Wt(kg): --  Daily     Daily       Physical Exam:  Gen: NAD; Patient resting comfortably  HEENT: EOMI, Normocephalic/ atraumatic  CV: RRR, normal S1 + S2, no m/r/g  Lungs:  Normal respiratory effort; clear to auscultation bilaterally  Abd: soft, non-tender; bowel sounds present  Ext: No edema; warm and well perfused    Telemetry: Sinus; no ectopy    Laboratory Data:                        10.5   7.06  )-----------( 178      ( 14 Aug 2022 05:40 )             31.7     08-14    138  |  104  |  44<H>  ----------------------------<  75  3.6   |  21<L>  |  2.74<H>    Ca    9.2      14 Aug 2022 05:40  Phos  3.2     08-14  Mg     2.30     08-14                Inpatient Medications:  MEDICATIONS  (STANDING):  amLODIPine   Tablet 10 milliGRAM(s) Oral daily  aspirin  chewable 81 milliGRAM(s) Oral daily  atorvastatin 20 milliGRAM(s) Oral at bedtime  calcium acetate 667 milliGRAM(s) Oral three times a day with meals  dextrose 5%. 1000 milliLiter(s) (100 mL/Hr) IV Continuous <Continuous>  dextrose 5%. 1000 milliLiter(s) (50 mL/Hr) IV Continuous <Continuous>  dextrose 50% Injectable 25 Gram(s) IV Push once  dextrose 50% Injectable 12.5 Gram(s) IV Push once  dextrose 50% Injectable 25 Gram(s) IV Push once  glucagon  Injectable 1 milliGRAM(s) IntraMuscular once  heparin   Injectable 5000 Unit(s) SubCutaneous every 12 hours  insulin glargine Injectable (LANTUS) 9 Unit(s) SubCutaneous at bedtime  insulin lispro (ADMELOG) corrective regimen sliding scale   SubCutaneous three times a day before meals  insulin lispro Injectable (ADMELOG) 3 Unit(s) SubCutaneous three times a day before meals  metoprolol tartrate 12.5 milliGRAM(s) Oral two times a day  sodium bicarbonate 650 milliGRAM(s) Oral three times a day  tamsulosin 0.4 milliGRAM(s) Oral at bedtime      Assessment: 71 year old man with IDDM, HTN, HLD, and CKD IV presents with syncope.     Plan of Care:    #Syncope-  Likely vasovagal etiology given the associated prodrome prior to passing out.  EKG is sinus and no ectopy noted on telemetry thus far.  Echocardiogram reviewed- mild LV dysfunction.  I would hold off on ischemic evaluation at this time given advanced renal disease not on HD.  Awaiting MRI, as ordered by the primary team.      #HTN-  BP is currently acceptable.    #CKD IV-  Renal input noted.     #ACP (advance care planning)-  Advanced care planning was discussed with the patient.   Telemetry and echo findings were discussed in detail and all questions were answered.     Over 25 minutes spent on total encounter; more than 50% of the visit was spent counseling and/or coordinating care by the attending physician.      Bc Kang MD [facility]  Cardiovascular Disease  [phone]

## 2022-08-14 NOTE — PROGRESS NOTE ADULT - ASSESSMENT
71M w/hx CKD, HTN, HLD, DM, referred to the ED by his PCP (Dr. Steffen Rene) for recurrent syncope over the last week. Nephrology consulted for JUNI on CKD stage 4.     JUNI on CKD stage 4 vs ckd stage 4  baseline ~ 2.4-2.5 follows up with dr grace in office  last visit in 7/13 showed scr worsen to 2.88  scr fluctuating likely CKD  Renal function stable today  avoid nephrotoxic agents  monitor bmp    Proteinuria  known  vasculitis work up neg done in office  check urine p/c raito  off ACEI and ARB     HTN  controlled  monitor    Hypokalemia  Improved  supplement as needed  monitor    syncope  work up per team 71M w/hx CKD, HTN, HLD, DM, referred to the ED by his PCP (Dr. Steffen Rene) for recurrent syncope over the last week. Nephrology consulted for JUNI on CKD stage 4.     JUNI on CKD stage 4 vs ckd stage 4  baseline ~ 2.4-2.5 follows up with dr grace in office  last visit in 7/13 showed scr worsen to 2.88  scr slightly fluctuating, likely this is new baseline  Renal function stable today  avoid nephrotoxic agents  monitor bmp    Proteinuria  known  vasculitis work up neg done in office  check urine p/c raito  off ACEI and ARB     HTN  controlled  monitor    Hypokalemia  Improved  supplement as needed  monitor    syncope  work up per team

## 2022-08-14 NOTE — PROGRESS NOTE ADULT - SUBJECTIVE AND OBJECTIVE BOX
[facility] [place] [facility]   MD VELASQUEZ PERDOMO MD RUORU WONG, PA KRISTINE SOLTANPOUR, DO INJUNG KO, NP    TEL:  OFFICE: [phone]    RENAL FOLLOW UP NOTE-- Date of Service ;; 08-14-22 @ 08:49  --------------------------------------------------------------------------------  HPI:  Pt seen and examined at bedside  Denies SOB, chest pain.         PAST HISTORY  --------------------------------------------------------------------------------  No significant changes to PMH, PSH, FHx, SHx, unless otherwise noted    ALLERGIES & MEDICATIONS  --------------------------------------------------------------------------------  Allergies    No Known Allergies    Intolerances      Standing Inpatient Medications  amLODIPine   Tablet 10 milliGRAM(s) Oral daily  aspirin  chewable 81 milliGRAM(s) Oral daily  atorvastatin 20 milliGRAM(s) Oral at bedtime  calcium acetate 667 milliGRAM(s) Oral three times a day with meals  dextrose 5%. 1000 milliLiter(s) IV Continuous <Continuous>  dextrose 5%. 1000 milliLiter(s) IV Continuous <Continuous>  dextrose 50% Injectable 25 Gram(s) IV Push once  dextrose 50% Injectable 12.5 Gram(s) IV Push once  dextrose 50% Injectable 25 Gram(s) IV Push once  glucagon  Injectable 1 milliGRAM(s) IntraMuscular once  heparin   Injectable 5000 Unit(s) SubCutaneous every 12 hours  insulin glargine Injectable (LANTUS) 9 Unit(s) SubCutaneous at bedtime  insulin lispro (ADMELOG) corrective regimen sliding scale   SubCutaneous three times a day before meals  insulin lispro Injectable (ADMELOG) 3 Unit(s) SubCutaneous three times a day before meals  metoprolol tartrate 12.5 milliGRAM(s) Oral two times a day  sodium bicarbonate 650 milliGRAM(s) Oral three times a day  tamsulosin 0.4 milliGRAM(s) Oral at bedtime    PRN Inpatient Medications  dextrose Oral Gel 15 Gram(s) Oral once PRN      REVIEW OF SYSTEMS  --------------------------------------------------------------------------------  General: no fever  CVS: no chest pain  RESP: no sob, no cough  ABD: no abdominal pain  : no dysuria,  MSK: no edema     VITALS/PHYSICAL EXAM  --------------------------------------------------------------------------------  T(C): 36.6 (08-14-22 @ 05:15), Max: 36.9 (08-13-22 @ 13:15)  HR: 71 (08-14-22 @ 05:15) (65 - 71)  BP: 150/71 (08-14-22 @ 05:15) (123/58 - 150/71)  RR: 15 (08-14-22 @ 05:15) (15 - 18)  SpO2: 99% (08-14-22 @ 05:15) (99% - 99%)  Wt(kg): --        Physical Exam:  	Gen: NAD  	HEENT: MMM  	Pulm: CTA B/L  	CV: S1S2  	Abd: Soft, +BS  	Ext: No LE edema B/L                      Neuro: Awake , alert  	Skin: Warm and Dry   	Vascular access: None             LABS/STUDIES  --------------------------------------------------------------------------------              10.5   7.06  >-----------<  178      [08-14-22 @ 05:40]              31.7     138  |  104  |  44  ----------------------------<  75      [08-14-22 @ 05:40]  3.6   |  21  |  2.74        Ca     9.2     [08-14-22 @ 05:40]      Mg     2.30     [08-14-22 @ 05:40]      Phos  3.2     [08-14-22 @ 05:40]            Creatinine Trend:  SCr 2.74 [08-14 @ 05:40]  SCr 2.80 [08-13 @ 07:16]  SCr 2.79 [08-12 @ 06:11]  SCr 2.68 [08-11 @ 07:06]  SCr 2.95 [08-10 @ 21:26]    Urinalysis - [08-10-22 @ 14:10]      Color Light Yellow / Appearance Clear / SG 1.015 / pH 6.0      Gluc >= 1000 mg/dL / Ketone Negative  / Bili Negative / Urobili <2 mg/dL       Blood Negative / Protein 100 mg/dL / Leuk Est Negative / Nitrite Negative      RBC 0 / WBC 0 / Hyaline 2 / Gran  / Sq Epi  / Non Sq Epi 0 / Bacteria Negative      HbA1c 7.3      [10-06-17 @ 05:30]    HCV 0.10, Nonreact      [08-11-22 @ 07:06]

## 2022-08-15 LAB
ANION GAP SERPL CALC-SCNC: 11 MMOL/L — SIGNIFICANT CHANGE UP (ref 7–14)
BUN SERPL-MCNC: 41 MG/DL — HIGH (ref 7–23)
CALCIUM SERPL-MCNC: 9.5 MG/DL — SIGNIFICANT CHANGE UP (ref 8.4–10.5)
CHLORIDE SERPL-SCNC: 107 MMOL/L — SIGNIFICANT CHANGE UP (ref 98–107)
CO2 SERPL-SCNC: 23 MMOL/L — SIGNIFICANT CHANGE UP (ref 22–31)
CREAT ?TM UR-MCNC: 41 MG/DL — SIGNIFICANT CHANGE UP
CREAT SERPL-MCNC: 2.84 MG/DL — HIGH (ref 0.5–1.3)
EGFR: 23 ML/MIN/1.73M2 — LOW
GLUCOSE BLDC GLUCOMTR-MCNC: 139 MG/DL — HIGH (ref 70–99)
GLUCOSE BLDC GLUCOMTR-MCNC: 147 MG/DL — HIGH (ref 70–99)
GLUCOSE BLDC GLUCOMTR-MCNC: 160 MG/DL — HIGH (ref 70–99)
GLUCOSE BLDC GLUCOMTR-MCNC: 84 MG/DL — SIGNIFICANT CHANGE UP (ref 70–99)
GLUCOSE SERPL-MCNC: 74 MG/DL — SIGNIFICANT CHANGE UP (ref 70–99)
HCT VFR BLD CALC: 33.3 % — LOW (ref 39–50)
HGB BLD-MCNC: 10.9 G/DL — LOW (ref 13–17)
MAGNESIUM SERPL-MCNC: 2.4 MG/DL — SIGNIFICANT CHANGE UP (ref 1.6–2.6)
MCHC RBC-ENTMCNC: 27.7 PG — SIGNIFICANT CHANGE UP (ref 27–34)
MCHC RBC-ENTMCNC: 32.7 GM/DL — SIGNIFICANT CHANGE UP (ref 32–36)
MCV RBC AUTO: 84.7 FL — SIGNIFICANT CHANGE UP (ref 80–100)
NRBC # BLD: 0 /100 WBCS — SIGNIFICANT CHANGE UP
NRBC # FLD: 0 K/UL — SIGNIFICANT CHANGE UP
PHOSPHATE SERPL-MCNC: 3.7 MG/DL — SIGNIFICANT CHANGE UP (ref 2.5–4.5)
PLATELET # BLD AUTO: 188 K/UL — SIGNIFICANT CHANGE UP (ref 150–400)
POTASSIUM SERPL-MCNC: 3.8 MMOL/L — SIGNIFICANT CHANGE UP (ref 3.5–5.3)
POTASSIUM SERPL-SCNC: 3.8 MMOL/L — SIGNIFICANT CHANGE UP (ref 3.5–5.3)
PROT ?TM UR-MCNC: 46 MG/DL — SIGNIFICANT CHANGE UP
PROT/CREAT UR-RTO: 1.1 RATIO — HIGH (ref 0–0.2)
RBC # BLD: 3.93 M/UL — LOW (ref 4.2–5.8)
RBC # FLD: 14.2 % — SIGNIFICANT CHANGE UP (ref 10.3–14.5)
SODIUM SERPL-SCNC: 141 MMOL/L — SIGNIFICANT CHANGE UP (ref 135–145)
WBC # BLD: 7.29 K/UL — SIGNIFICANT CHANGE UP (ref 3.8–10.5)
WBC # FLD AUTO: 7.29 K/UL — SIGNIFICANT CHANGE UP (ref 3.8–10.5)

## 2022-08-15 PROCEDURE — 70551 MRI BRAIN STEM W/O DYE: CPT | Mod: 26

## 2022-08-15 PROCEDURE — 70547 MR ANGIOGRAPHY NECK W/O DYE: CPT | Mod: 26

## 2022-08-15 PROCEDURE — 95720 EEG PHY/QHP EA INCR W/VEEG: CPT

## 2022-08-15 PROCEDURE — 70544 MR ANGIOGRAPHY HEAD W/O DYE: CPT | Mod: 26,59

## 2022-08-15 RX ADMIN — Medication 650 MILLIGRAM(S): at 21:15

## 2022-08-15 RX ADMIN — Medication 3 UNIT(S): at 18:10

## 2022-08-15 RX ADMIN — Medication 667 MILLIGRAM(S): at 18:09

## 2022-08-15 RX ADMIN — ATORVASTATIN CALCIUM 20 MILLIGRAM(S): 80 TABLET, FILM COATED ORAL at 21:16

## 2022-08-15 RX ADMIN — HEPARIN SODIUM 5000 UNIT(S): 5000 INJECTION INTRAVENOUS; SUBCUTANEOUS at 05:16

## 2022-08-15 RX ADMIN — Medication 81 MILLIGRAM(S): at 13:29

## 2022-08-15 RX ADMIN — Medication 12.5 MILLIGRAM(S): at 05:15

## 2022-08-15 RX ADMIN — Medication 650 MILLIGRAM(S): at 13:30

## 2022-08-15 RX ADMIN — TAMSULOSIN HYDROCHLORIDE 0.4 MILLIGRAM(S): 0.4 CAPSULE ORAL at 21:15

## 2022-08-15 RX ADMIN — Medication 667 MILLIGRAM(S): at 13:30

## 2022-08-15 RX ADMIN — Medication 667 MILLIGRAM(S): at 09:37

## 2022-08-15 RX ADMIN — Medication 3 UNIT(S): at 09:36

## 2022-08-15 RX ADMIN — HEPARIN SODIUM 5000 UNIT(S): 5000 INJECTION INTRAVENOUS; SUBCUTANEOUS at 18:09

## 2022-08-15 RX ADMIN — Medication 12.5 MILLIGRAM(S): at 18:09

## 2022-08-15 RX ADMIN — Medication 3 UNIT(S): at 13:29

## 2022-08-15 RX ADMIN — Medication 650 MILLIGRAM(S): at 05:16

## 2022-08-15 RX ADMIN — AMLODIPINE BESYLATE 10 MILLIGRAM(S): 2.5 TABLET ORAL at 05:15

## 2022-08-15 RX ADMIN — INSULIN GLARGINE 9 UNIT(S): 100 INJECTION, SOLUTION SUBCUTANEOUS at 22:18

## 2022-08-15 NOTE — PROGRESS NOTE ADULT - SUBJECTIVE AND OBJECTIVE BOX
Cardiovascular Disease Progress Note    Overnight events: No acute events overnight.  Patient is ambulating. No chest pain or SOB.       Objective Findings:  T(C): 36.7 (08-15-22 @ 05:14), Max: 37 (08-14-22 @ 18:00)  HR: 62 (08-15-22 @ 05:14) (62 - 77)  BP: 142/68 (08-15-22 @ 05:14) (129/70 - 142/68)  RR: 16 (08-15-22 @ 05:14) (16 - 18)  SpO2: 99% (08-15-22 @ 05:14) (99% - 99%)  Wt(kg): --  Daily     Daily       Physical Exam:  Gen: NAD; Patient resting comfortably  HEENT: EOMI, Normocephalic/ atraumatic  CV: RRR, normal S1 + S2, no m/r/g  Lungs:  Normal respiratory effort; clear to auscultation bilaterally  Abd: soft, non-tender; bowel sounds present  Ext: No edema; warm and well perfused    Telemetry: Sinus; no ectopy    Laboratory Data:                        10.9   7.29  )-----------( 188      ( 15 Aug 2022 05:20 )             33.3     08-15    141  |  107  |  41<H>  ----------------------------<  74  3.8   |  23  |  2.84<H>    Ca    9.5      15 Aug 2022 05:20  Phos  3.7     08-15  Mg     2.40     08-15                Inpatient Medications:  MEDICATIONS  (STANDING):  amLODIPine   Tablet 10 milliGRAM(s) Oral daily  aspirin  chewable 81 milliGRAM(s) Oral daily  atorvastatin 20 milliGRAM(s) Oral at bedtime  calcium acetate 667 milliGRAM(s) Oral three times a day with meals  dextrose 5%. 1000 milliLiter(s) (100 mL/Hr) IV Continuous <Continuous>  dextrose 5%. 1000 milliLiter(s) (50 mL/Hr) IV Continuous <Continuous>  dextrose 50% Injectable 25 Gram(s) IV Push once  dextrose 50% Injectable 12.5 Gram(s) IV Push once  dextrose 50% Injectable 25 Gram(s) IV Push once  glucagon  Injectable 1 milliGRAM(s) IntraMuscular once  heparin   Injectable 5000 Unit(s) SubCutaneous every 12 hours  insulin glargine Injectable (LANTUS) 9 Unit(s) SubCutaneous at bedtime  insulin lispro (ADMELOG) corrective regimen sliding scale   SubCutaneous three times a day before meals  insulin lispro Injectable (ADMELOG) 3 Unit(s) SubCutaneous three times a day before meals  metoprolol tartrate 12.5 milliGRAM(s) Oral two times a day  sodium bicarbonate 650 milliGRAM(s) Oral three times a day  tamsulosin 0.4 milliGRAM(s) Oral at bedtime      Assessment:  71 year old man with IDDM, HTN, HLD, and CKD IV presents with syncope.     Plan of Care:    #Syncope-  Likely vasovagal etiology given the associated prodrome prior to passing out.  EKG is sinus and no ectopy noted on telemetry thus far.  Echocardiogram reviewed- mild LV dysfunction.  I would hold off on ischemic evaluation at this time given advanced renal disease not on HD.  Awaiting MRI, as ordered by the primary team.      #HTN-  BP is currently acceptable.    #CKD IV-  Renal input noted.       Over 25 minutes spent on total encounter; more than 50% of the visit was spent counseling and/or coordinating care by the attending physician.      Bc Kang MD [facility]  Cardiovascular Disease  [phone]

## 2022-08-15 NOTE — PROGRESS NOTE ADULT - SUBJECTIVE AND OBJECTIVE BOX
Patient is a 71y old  Male who presents with a chief complaint of syncope (15 Aug 2022 13:08)    Date of servie : 08-15-22 @ 14:13  INTERVAL HPI/OVERNIGHT EVENTS:  T(C): 36.7 (08-15-22 @ 05:14), Max: 37 (08-14-22 @ 18:00)  HR: 62 (08-15-22 @ 05:14) (62 - 65)  BP: 142/68 (08-15-22 @ 05:14) (136/61 - 142/68)  RR: 16 (08-15-22 @ 05:14) (16 - 18)  SpO2: 99% (08-15-22 @ 05:14) (99% - 99%)  Wt(kg): --  I&O's Summary      LABS:                        10.9   7.29  )-----------( 188      ( 15 Aug 2022 05:20 )             33.3     08-15    141  |  107  |  41<H>  ----------------------------<  74  3.8   |  23  |  2.84<H>    Ca    9.5      15 Aug 2022 05:20  Phos  3.7     08-15  Mg     2.40     08-15          CAPILLARY BLOOD GLUCOSE      POCT Blood Glucose.: 139 mg/dL (15 Aug 2022 13:28)  POCT Blood Glucose.: 84 mg/dL (15 Aug 2022 08:45)  POCT Blood Glucose.: 168 mg/dL (14 Aug 2022 21:18)  POCT Blood Glucose.: 143 mg/dL (14 Aug 2022 17:46)            MEDICATIONS  (STANDING):  amLODIPine   Tablet 10 milliGRAM(s) Oral daily  aspirin  chewable 81 milliGRAM(s) Oral daily  atorvastatin 20 milliGRAM(s) Oral at bedtime  calcium acetate 667 milliGRAM(s) Oral three times a day with meals  dextrose 5%. 1000 milliLiter(s) (100 mL/Hr) IV Continuous <Continuous>  dextrose 5%. 1000 milliLiter(s) (50 mL/Hr) IV Continuous <Continuous>  dextrose 50% Injectable 25 Gram(s) IV Push once  dextrose 50% Injectable 12.5 Gram(s) IV Push once  dextrose 50% Injectable 25 Gram(s) IV Push once  glucagon  Injectable 1 milliGRAM(s) IntraMuscular once  heparin   Injectable 5000 Unit(s) SubCutaneous every 12 hours  insulin glargine Injectable (LANTUS) 9 Unit(s) SubCutaneous at bedtime  insulin lispro (ADMELOG) corrective regimen sliding scale   SubCutaneous three times a day before meals  insulin lispro Injectable (ADMELOG) 3 Unit(s) SubCutaneous three times a day before meals  metoprolol tartrate 12.5 milliGRAM(s) Oral two times a day  sodium bicarbonate 650 milliGRAM(s) Oral three times a day  tamsulosin 0.4 milliGRAM(s) Oral at bedtime    MEDICATIONS  (PRN):  dextrose Oral Gel 15 Gram(s) Oral once PRN Blood Glucose LESS THAN 70 milliGRAM(s)/deciliter          PHYSICAL EXAM:  GENERAL: NAD, well-groomed, well-developed  HEAD:  Atraumatic, Normocephalic  CHEST/LUNG: Clear to percussion bilaterally; No rales, rhonchi, wheezing, or rubs  HEART: Regular rate and rhythm; No murmurs, rubs, or gallops  ABDOMEN: Soft, Nontender, Nondistended; Bowel sounds present  EXTREMITIES:  2+ Peripheral Pulses, No clubbing, cyanosis, or edema  LYMPH: No lymphadenopathy noted  SKIN: No rashes or lesions    Care Discussed with Consultants/Other Providers [ ] YES  [ ] NO

## 2022-08-15 NOTE — PROGRESS NOTE ADULT - ASSESSMENT
Problem/Plan - 1:  ·  Problem: Syncope and collapse.   ·  Plan: - Patient with 1 episode of sudden onset lightheadedness and dizziness, with b/l UE shaking, and followed by period of confusion; associated with urinary incontinence  - Most likely seizure activity   – first occurrence  - Obtain video EEG  - neuro fu   - MRI neg     Problem/Plan - 2:·  Problem: T2DM (type 2 diabetes mellitus).   ·  Plan: - Home regimen: insulin detemir 15U subq before lunch once daily; farxiga  - fingersticks qachs     Problem/Plan - 3:  ·  Problem: Normocytic anemia.   ·  Plan: - Hb 11.5 on presentation – check ferritin, iron level, iron saturation, transferrin, TIBC  - Trend CBC for now  - Likely as a result of CKD.    Problem/Plan - 4:  ·  Problem: Hypokalemia.     ·  Plan: - K 3.4 on presentation, replenish as warranted.  Problem/Plan - 5:  ·  Problem: Stage 4 chronic kidney disease.   ·  Plan: - Cr 2.8 on presentation – prior known baseline 2.4-2.5 outpatient  - Progression vs JUNI  - Continue home calcium acetate, sodium bicarbonate tabs  - Nephrology following.    Problem/Plan - 6:  ·  Problem: Elevated brain natriuretic peptide (BNP) level.   ·  Plan: - proBNP 715 on presentation – no known baseline  - CXR unremarkable.    Problem/Plan - 7:  ·  Problem: HTN (hypertension).   ·  Plan: - Goal normotension – resume home meds.  Problem/Plan - 8:  ·  Problem: Prophylactic measure.

## 2022-08-15 NOTE — PROGRESS NOTE ADULT - SUBJECTIVE AND OBJECTIVE BOX
Neurology Progress Note    S: Patient seen and examined. No new events overnight. patient denied CP, SOB, HA or pain. states  better MRI and eeg pending     Medication:  MEDICATIONS  (STANDING):  amLODIPine   Tablet 10 milliGRAM(s) Oral daily  aspirin  chewable 81 milliGRAM(s) Oral daily  atorvastatin 20 milliGRAM(s) Oral at bedtime  calcium acetate 667 milliGRAM(s) Oral three times a day with meals  dextrose 5%. 1000 milliLiter(s) (100 mL/Hr) IV Continuous <Continuous>  dextrose 5%. 1000 milliLiter(s) (50 mL/Hr) IV Continuous <Continuous>  dextrose 50% Injectable 25 Gram(s) IV Push once  dextrose 50% Injectable 12.5 Gram(s) IV Push once  dextrose 50% Injectable 25 Gram(s) IV Push once  glucagon  Injectable 1 milliGRAM(s) IntraMuscular once  heparin   Injectable 5000 Unit(s) SubCutaneous every 12 hours  insulin glargine Injectable (LANTUS) 9 Unit(s) SubCutaneous at bedtime  insulin lispro (ADMELOG) corrective regimen sliding scale   SubCutaneous three times a day before meals  insulin lispro Injectable (ADMELOG) 3 Unit(s) SubCutaneous three times a day before meals  metoprolol tartrate 12.5 milliGRAM(s) Oral two times a day  sodium bicarbonate 650 milliGRAM(s) Oral three times a day  tamsulosin 0.4 milliGRAM(s) Oral at bedtime    MEDICATIONS  (PRN):  dextrose Oral Gel 15 Gram(s) Oral once PRN Blood Glucose LESS THAN 70 milliGRAM(s)/deciliter        Vitals:    Vital Signs Last 24 Hrs  T(C): 36.7 (08-15-22 @ 05:14), Max: 37 (08-14-22 @ 18:00)  T(F): 98 (08-15-22 @ 05:14), Max: 98.6 (08-14-22 @ 18:00)  HR: 62 (08-15-22 @ 05:14) (62 - 77)  BP: 142/68 (08-15-22 @ 05:14) (129/70 - 142/68)  BP(mean): --  RR: 16 (08-15-22 @ 05:14) (16 - 18)  SpO2: 99% (08-15-22 @ 05:14) (99% - 99%)          General Exam:   General Appearance: Appropriately dressed and in no acute distress       Head: Normocephalic, atraumatic and no dysmorphic features  Ear, Nose, and Throat: Moist mucous membranes  CVS: S1S2+  Resp: No SOB, no wheeze or rhonchi  Abd: soft NTND  Extremities: No edema, no cyanosis  Skin: No bruises, no rashes     Neurological Exam:  Mental Status: Awake, alert and oriented x 3.  Able to follow simple and complex verbal commands. Able to name and repeat. fluent speech. No obvious aphasia or dysarthria noted.   Cranial Nerves: PERRL, EOMI, VFFC, sensation V1-V3 intact,  no obvious facial asymmetry, equal elevation of palate, scm/trap 5/5, tongue is midline on protrusion. no obvious papilledema on fundoscopic exam. hearing is grossly intact.   Motor: Normal bulk, tone and strength throughout. Fine finger movements were intact and symmetric. no tremors or drift noted.    Sensation: Intact to light touch and pinprick throughout. no right/left confusion. no extinction to tactile on DSS.   Reflexes: 1+ throughout at biceps, brachioradialis, triceps, patellars and ankles bilaterally and equal. No clonus. R toe and L toe were both downgoing.  Coordination: No dysmetria on FNF or HKS  Gait:deferred     I personally reviewed the below data/images/labs:    CBC Full  -  ( 15 Aug 2022 05:20 )  WBC Count : 7.29 K/uL  RBC Count : 3.93 M/uL  Hemoglobin : 10.9 g/dL  Hematocrit : 33.3 %  Platelet Count - Automated : 188 K/uL  Mean Cell Volume : 84.7 fL  Mean Cell Hemoglobin : 27.7 pg  Mean Cell Hemoglobin Concentration : 32.7 gm/dL  Auto Neutrophil # : x  Auto Lymphocyte # : x  Auto Monocyte # : x  Auto Eosinophil # : x  Auto Basophil # : x  Auto Neutrophil % : x  Auto Lymphocyte % : x  Auto Monocyte % : x  Auto Eosinophil % : x  Auto Basophil % : x    08-15    141  |  107  |  41<H>  ----------------------------<  74  3.8   |  23  |  2.84<H>    Ca    9.5      15 Aug 2022 05:20  Phos  3.7     08-15  Mg     2.40     08-15      < from: CT Head No Cont (08.10.22 @ 14:29) >    ACC: [id number] EXAM:  CT BRAIN                          PROCEDURE DATE:  08/10/2022          INTERPRETATION:  Clinical indication: Syncope.    Multiple axial sections were performed from base of skull to vertex   without contrast enhancement coronaland sagittal reconstructions were   performed as well.    Parenchymal volume loss and chronic microvascular ischemic changes are   identified    There is no acute hemorrhage mass or mass effect seen.    Evaluation of the osseous structures with the appropriate window appears   unremarkable    The visualized paranasal sinuses mastoid and middle ear regions appear   clear.    IMPRESSION: No evidence of acute hemorrhage, mass or mass effect.    --- End of Report ---      Orthostatic VS  < from: Transthoracic Echocardiogram (08.11.22 @ 15:27) >    Patient name: RAJEEV EWISS  YOB: 1950   Age: 71 (M)   MR#: [id number]  Study Date: 8/11/2022  Location: [address] [place] [address] off tele Sonographer: CELESTE Ortega  Study quality: Technically good  Referring Physician: Em Hernandez MD  Blood Pressure: 150/75 mmHg  Height: 162 cm  Weight: 62 kg  BSA: 1.7 m2  ------------------------------------------------------------------------  PROCEDURE: Transthoracic echocardiogram with 2-D, M-Mode  and complete spectral and color flow Doppler.  INDICATION: Syncope and collapse (R55)  ------------------------------------------------------------------------  DIMENSIONS:  Dimensions:     Normal Values:  LA:     3.5 cm    2.0 - 4.0 cm  Ao:     3.5 cm    2.0 - 3.8 cm  SEPTUM: 0.7 cm    0.6 - 1.2 cm  PWT:    0.7 cm    0.6 - 1.1 cm  LVIDd:  4.8 cm    3.0 - 5.6 cm  LVIDs:  3.3 cm    1.8 - 4.0 cm  Derived Variables:  LVMI: 64 g/m2  RWT: 0.29  Fractional short: 31 %  Ejection Fraction (Modified Garcia Rule): 50 %  ------------------------------------------------------------------------  OBSERVATIONS:  Mitral Valve: Mitral annular calcification, otherwise  normal mitral valve. Mild mitral regurgitation.  Aortic Root: Normal aortic root.  Aortic Valve: Calcified trileaflet aortic valve with normal  opening.  Left Atrium: Mildly dilated left atrium.  LA volume index =  35 cc/m2.  Left Ventricle: Mild global left ventricular systolic  dysfunction. Normal left ventricular internal dimensions  and wall thicknesses. Mild diastolic dysfunction (Stage I).  Right Heart: Normal right atrium. Normal right ventricular  size and function. Normal tricuspid valve.  Minimal  tricuspid regurgitation. Normal pulmonic valve.  Pericardium/PleuraNormal pericardium with no pericardial  effusion.  ------------------------------------------------------------------------  CONCLUSIONS:  1. Mitral annular calcification, otherwise normal mitral  valve. Mild mitral regurgitation.  2. Mildly dilated left atrium.  LA volume index = 35 cc/m2.  3. Normal left ventricular internal dimensions and wall  thicknesses.  4. Mild global left ventricular systolic dysfunction.  5. Mild diastolic dysfunction (Stage I).  6. Normal right ventricular size and function.  ------------------------------------------------------------------------  Confirmed on  8/11/2022 - 16:52:40 by Edgard French M.D.,  [facility], [facility]  ------------------------------------------------------------------------    < end of copied text >      Orthostatic VS

## 2022-08-15 NOTE — PROGRESS NOTE ADULT - ASSESSMENT
70yo Citizen of [place] M with DM, HTN, HLD, and CKD who presented after 1 episode of syncope 1 day prior to presentation. He stated that he had walked down a flight of stairs and out of a building when he felt a sudden lightheadedness lasting maybe 30 seconds, associated with a trembling sensation in his upper extremities bilaterally, and fainted. no head trauma. + LOC, + incontinence, no tongue bite, a bit confused shortly after, now baseline. states dizzy when stands  CTH unremarkable   TTE as above  Impression: Syncope more likely than seizure.   - check orthostatics   - cardiac workup, TTE, tele  - c/w asa and statin therapy   - MRI brain seizure protocol. will also add MRA H/N  - vEEG pending  would get MRI first ;  can likely do EEG outpatient in ambulatory setting   - Hemoglobin A1c and lipid panel  - PT/OT--> no needs   - check FS, glucose control <180  - GI/DVT ppx  - Thank you for allowing me to participate in the care of this patient. Call with questions.   - spoke with ACP   Jason Rosales MD  Vascular Neurology  Office: [phone]

## 2022-08-15 NOTE — PROGRESS NOTE ADULT - SUBJECTIVE AND OBJECTIVE BOX
[facility] [place] [facility]   MD VELASQUEZ PERDOMO MD KRISTINE SOLTANPOUR, DO ANGELA WONG, PA    TEL:  OFFICE: [phone]  From 5pm-7am Answering Service [phone]    -- RENAL FOLLOW UP NOTE ---Date of Service 08-15-22 @ 13:08    Patient is a 71y old  Male who presents with a chief complaint of syncope (15 Aug 2022 10:07)      Patient seen and examined at bedside. No chest pain/sob    VITALS:  T(F): 98 (08-15-22 @ 05:14), Max: 98.6 (08-14-22 @ 18:00)  HR: 62 (08-15-22 @ 05:14)  BP: 142/68 (08-15-22 @ 05:14)  RR: 16 (08-15-22 @ 05:14)  SpO2: 99% (08-15-22 @ 05:14)  Wt(kg): --        PHYSICAL EXAM:  General: NAD  Neck: No JVD  Respiratory: CTAB, no wheezes, rales or rhonchi  Cardiovascular: S1, S2, RRR  Gastrointestinal: BS+, soft, NT/ND  Extremities: No peripheral edema    Hospital Medications:   MEDICATIONS  (STANDING):  amLODIPine   Tablet 10 milliGRAM(s) Oral daily  aspirin  chewable 81 milliGRAM(s) Oral daily  atorvastatin 20 milliGRAM(s) Oral at bedtime  calcium acetate 667 milliGRAM(s) Oral three times a day with meals  dextrose 5%. 1000 milliLiter(s) (100 mL/Hr) IV Continuous <Continuous>  dextrose 5%. 1000 milliLiter(s) (50 mL/Hr) IV Continuous <Continuous>  dextrose 50% Injectable 25 Gram(s) IV Push once  dextrose 50% Injectable 12.5 Gram(s) IV Push once  dextrose 50% Injectable 25 Gram(s) IV Push once  glucagon  Injectable 1 milliGRAM(s) IntraMuscular once  heparin   Injectable 5000 Unit(s) SubCutaneous every 12 hours  insulin glargine Injectable (LANTUS) 9 Unit(s) SubCutaneous at bedtime  insulin lispro (ADMELOG) corrective regimen sliding scale   SubCutaneous three times a day before meals  insulin lispro Injectable (ADMELOG) 3 Unit(s) SubCutaneous three times a day before meals  metoprolol tartrate 12.5 milliGRAM(s) Oral two times a day  sodium bicarbonate 650 milliGRAM(s) Oral three times a day  tamsulosin 0.4 milliGRAM(s) Oral at bedtime      LABS:  08-15    141  |  107  |  41<H>  ----------------------------<  74  3.8   |  23  |  2.84<H>    Ca    9.5      15 Aug 2022 05:20  Phos  3.7     08-15  Mg     2.40     08-15      Creatinine Trend: 2.84 <--, 2.74 <--, 2.80 <--, 2.79 <--, 2.68 <--, 2.95 <--, 2.80 <--    Phosphorus Level, Serum: 3.7 mg/dL (08-15 @ 05:20)                              10.9   7.29  )-----------( 188      ( 15 Aug 2022 05:20 )             33.3     Urine Studies:  Urinalysis - [08-10-22 @ 14:10]      Color Light Yellow / Appearance Clear / SG 1.015 / pH 6.0      Gluc >= 1000 mg/dL / Ketone Negative  / Bili Negative / Urobili <2 mg/dL       Blood Negative / Protein 100 mg/dL / Leuk Est Negative / Nitrite Negative      RBC 0 / WBC 0 / Hyaline 2 / Gran  / Sq Epi  / Non Sq Epi 0 / Bacteria Negative    Urine Creatinine 41      [08-15-22 @ 05:55]  Urine Protein 46      [08-15-22 @ 05:55]    HbA1c 7.3      [10-06-17 @ 05:30]    HCV 0.10, Nonreact      [08-11-22 @ 07:06]      RADIOLOGY & ADDITIONAL STUDIES:

## 2022-08-15 NOTE — PROGRESS NOTE ADULT - ASSESSMENT
71M w/hx CKD, HTN, HLD, DM, referred to the ED by his PCP (Dr. Steffen Rene) for recurrent syncope over the last week. Nephrology consulted for JUNI on CKD stage 4.     JUNI on CKD stage 4 vs ckd stage 4  baseline ~ 2.4-2.5 follows up with dr grace in office  last visit in 7/13 showed scr worsen to 2.88  scr slightly fluctuating, likely this is new baseline  Renal function stable today  avoid nephrotoxic agents  monitor bmp    Proteinuria  known  vasculitis work up neg done in office  check urine p/c raito  off ACEI and ARB     HTN  controlled  monitor    Hypokalemia  Improved  supplement as needed  monitor    syncope  work up per team

## 2022-08-16 LAB
ANION GAP SERPL CALC-SCNC: 13 MMOL/L — SIGNIFICANT CHANGE UP (ref 7–14)
BUN SERPL-MCNC: 43 MG/DL — HIGH (ref 7–23)
CALCIUM SERPL-MCNC: 9.4 MG/DL — SIGNIFICANT CHANGE UP (ref 8.4–10.5)
CHLORIDE SERPL-SCNC: 105 MMOL/L — SIGNIFICANT CHANGE UP (ref 98–107)
CO2 SERPL-SCNC: 22 MMOL/L — SIGNIFICANT CHANGE UP (ref 22–31)
CREAT SERPL-MCNC: 2.83 MG/DL — HIGH (ref 0.5–1.3)
EGFR: 23 ML/MIN/1.73M2 — LOW
GLUCOSE BLDC GLUCOMTR-MCNC: 122 MG/DL — HIGH (ref 70–99)
GLUCOSE BLDC GLUCOMTR-MCNC: 130 MG/DL — HIGH (ref 70–99)
GLUCOSE BLDC GLUCOMTR-MCNC: 81 MG/DL — SIGNIFICANT CHANGE UP (ref 70–99)
GLUCOSE BLDC GLUCOMTR-MCNC: 84 MG/DL — SIGNIFICANT CHANGE UP (ref 70–99)
GLUCOSE SERPL-MCNC: 83 MG/DL — SIGNIFICANT CHANGE UP (ref 70–99)
HCT VFR BLD CALC: 35.4 % — LOW (ref 39–50)
HGB BLD-MCNC: 11.4 G/DL — LOW (ref 13–17)
MAGNESIUM SERPL-MCNC: 2.4 MG/DL — SIGNIFICANT CHANGE UP (ref 1.6–2.6)
MCHC RBC-ENTMCNC: 27.5 PG — SIGNIFICANT CHANGE UP (ref 27–34)
MCHC RBC-ENTMCNC: 32.2 GM/DL — SIGNIFICANT CHANGE UP (ref 32–36)
MCV RBC AUTO: 85.5 FL — SIGNIFICANT CHANGE UP (ref 80–100)
NRBC # BLD: 0 /100 WBCS — SIGNIFICANT CHANGE UP
NRBC # FLD: 0 K/UL — SIGNIFICANT CHANGE UP
PHOSPHATE SERPL-MCNC: 3.7 MG/DL — SIGNIFICANT CHANGE UP (ref 2.5–4.5)
PLATELET # BLD AUTO: 207 K/UL — SIGNIFICANT CHANGE UP (ref 150–400)
POTASSIUM SERPL-MCNC: 4.1 MMOL/L — SIGNIFICANT CHANGE UP (ref 3.5–5.3)
POTASSIUM SERPL-SCNC: 4.1 MMOL/L — SIGNIFICANT CHANGE UP (ref 3.5–5.3)
RBC # BLD: 4.14 M/UL — LOW (ref 4.2–5.8)
RBC # FLD: 14.4 % — SIGNIFICANT CHANGE UP (ref 10.3–14.5)
SODIUM SERPL-SCNC: 140 MMOL/L — SIGNIFICANT CHANGE UP (ref 135–145)
WBC # BLD: 9.02 K/UL — SIGNIFICANT CHANGE UP (ref 3.8–10.5)
WBC # FLD AUTO: 9.02 K/UL — SIGNIFICANT CHANGE UP (ref 3.8–10.5)

## 2022-08-16 RX ORDER — INSULIN GLARGINE 100 [IU]/ML
6 INJECTION, SOLUTION SUBCUTANEOUS AT BEDTIME
Refills: 0 | Status: DISCONTINUED | OUTPATIENT
Start: 2022-08-16 | End: 2022-08-17

## 2022-08-16 RX ORDER — SODIUM CHLORIDE 9 MG/ML
1000 INJECTION INTRAMUSCULAR; INTRAVENOUS; SUBCUTANEOUS
Refills: 0 | Status: DISCONTINUED | OUTPATIENT
Start: 2022-08-16 | End: 2022-08-16

## 2022-08-16 RX ADMIN — Medication 12.5 MILLIGRAM(S): at 17:44

## 2022-08-16 RX ADMIN — Medication 3 UNIT(S): at 09:00

## 2022-08-16 RX ADMIN — SODIUM CHLORIDE 100 MILLILITER(S): 9 INJECTION INTRAMUSCULAR; INTRAVENOUS; SUBCUTANEOUS at 14:07

## 2022-08-16 RX ADMIN — ATORVASTATIN CALCIUM 20 MILLIGRAM(S): 80 TABLET, FILM COATED ORAL at 21:09

## 2022-08-16 RX ADMIN — Medication 3 UNIT(S): at 18:49

## 2022-08-16 RX ADMIN — Medication 650 MILLIGRAM(S): at 14:06

## 2022-08-16 RX ADMIN — Medication 3 UNIT(S): at 13:25

## 2022-08-16 RX ADMIN — Medication 650 MILLIGRAM(S): at 21:09

## 2022-08-16 RX ADMIN — Medication 667 MILLIGRAM(S): at 09:00

## 2022-08-16 RX ADMIN — AMLODIPINE BESYLATE 10 MILLIGRAM(S): 2.5 TABLET ORAL at 05:13

## 2022-08-16 RX ADMIN — HEPARIN SODIUM 5000 UNIT(S): 5000 INJECTION INTRAVENOUS; SUBCUTANEOUS at 05:13

## 2022-08-16 RX ADMIN — INSULIN GLARGINE 6 UNIT(S): 100 INJECTION, SOLUTION SUBCUTANEOUS at 22:41

## 2022-08-16 RX ADMIN — TAMSULOSIN HYDROCHLORIDE 0.4 MILLIGRAM(S): 0.4 CAPSULE ORAL at 21:09

## 2022-08-16 RX ADMIN — Medication 667 MILLIGRAM(S): at 17:45

## 2022-08-16 RX ADMIN — Medication 12.5 MILLIGRAM(S): at 05:12

## 2022-08-16 RX ADMIN — Medication 650 MILLIGRAM(S): at 05:13

## 2022-08-16 RX ADMIN — Medication 81 MILLIGRAM(S): at 14:06

## 2022-08-16 RX ADMIN — HEPARIN SODIUM 5000 UNIT(S): 5000 INJECTION INTRAVENOUS; SUBCUTANEOUS at 17:45

## 2022-08-16 RX ADMIN — Medication 667 MILLIGRAM(S): at 14:06

## 2022-08-16 NOTE — PROGRESS NOTE ADULT - SUBJECTIVE AND OBJECTIVE BOX
Cardiovascular Disease Progress Note    Overnight events: No acute events overnight.   Patient denies chest pain or SOB.    Otherwise review of systems negative    Objective Findings:  T(C): 36.8 (08-16-22 @ 05:10), Max: 37 (08-15-22 @ 13:15)  HR: 66 (08-16-22 @ 05:10) (56 - 69)  BP: 138/69 (08-16-22 @ 05:10) (127/70 - 141/61)  RR: 18 (08-16-22 @ 05:10) (18 - 18)  SpO2: 96% (08-16-22 @ 05:10) (96% - 99%)  Wt(kg): --  Daily     Daily       Physical Exam:  Gen: NAD; Patient resting comfortably  HEENT: EOMI, Normocephalic/ atraumatic  CV: RRR, normal S1 + S2, no m/r/g  Lungs:  Normal respiratory effort; clear to auscultation bilaterally  Abd: soft, non-tender; bowel sounds present  Ext: No edema; warm and well perfused    Telemetry: Sinus; no ectopy    Laboratory Data:                        10.9   7.29  )-----------( 188      ( 15 Aug 2022 05:20 )             33.3     08-15    141  |  107  |  41<H>  ----------------------------<  74  3.8   |  23  |  2.84<H>    Ca    9.5      15 Aug 2022 05:20  Phos  3.7     08-15  Mg     2.40     08-15                Inpatient Medications:  MEDICATIONS  (STANDING):  amLODIPine   Tablet 10 milliGRAM(s) Oral daily  aspirin  chewable 81 milliGRAM(s) Oral daily  atorvastatin 20 milliGRAM(s) Oral at bedtime  calcium acetate 667 milliGRAM(s) Oral three times a day with meals  dextrose 5%. 1000 milliLiter(s) (100 mL/Hr) IV Continuous <Continuous>  dextrose 5%. 1000 milliLiter(s) (50 mL/Hr) IV Continuous <Continuous>  dextrose 50% Injectable 25 Gram(s) IV Push once  dextrose 50% Injectable 12.5 Gram(s) IV Push once  dextrose 50% Injectable 25 Gram(s) IV Push once  glucagon  Injectable 1 milliGRAM(s) IntraMuscular once  heparin   Injectable 5000 Unit(s) SubCutaneous every 12 hours  insulin glargine Injectable (LANTUS) 9 Unit(s) SubCutaneous at bedtime  insulin lispro (ADMELOG) corrective regimen sliding scale   SubCutaneous three times a day before meals  insulin lispro Injectable (ADMELOG) 3 Unit(s) SubCutaneous three times a day before meals  metoprolol tartrate 12.5 milliGRAM(s) Oral two times a day  sodium bicarbonate 650 milliGRAM(s) Oral three times a day  tamsulosin 0.4 milliGRAM(s) Oral at bedtime      Assessment: 71 year old man with IDDM, HTN, HLD, and CKD IV presents with syncope.     Plan of Care:    #Syncope-  Likely vasovagal etiology given the associated prodrome prior to passing out.  EKG is sinus and no ectopy noted on telemetry thus far.  Echocardiogram reviewed- mild LV dysfunction.  I would hold off on ischemic evaluation at this time given advanced renal disease not on HD.    #HTN-  BP is currently acceptable.    #CKD IV-  Renal input noted.         Over 25 minutes spent on total encounter; more than 50% of the visit was spent counseling and/or coordinating care by the attending physician.      Bc Kang MD [facility]  Cardiovascular Disease  [phone]

## 2022-08-16 NOTE — PROGRESS NOTE ADULT - SUBJECTIVE AND OBJECTIVE BOX
Patient is a 71y old  Male who presents with a chief complaint of syncope (16 Aug 2022 11:00)    Date of servie : 08-16-22 @ 19:31  INTERVAL HPI/OVERNIGHT EVENTS:  T(C): 36.6 (08-16-22 @ 17:30), Max: 36.8 (08-16-22 @ 05:10)  HR: 72 (08-16-22 @ 17:30) (63 - 74)  BP: 135/65 (08-16-22 @ 17:30) (120/89 - 142/67)  RR: 15 (08-16-22 @ 17:30) (15 - 18)  SpO2: 100% (08-16-22 @ 17:30) (96% - 100%)  Wt(kg): --  I&O's Summary    15 Aug 2022 07:01  -  16 Aug 2022 07:00  --------------------------------------------------------  IN: 150 mL / OUT: 400 mL / NET: -250 mL        LABS:                        11.4   9.02  )-----------( 207      ( 16 Aug 2022 06:10 )             35.4     08-16    140  |  105  |  43<H>  ----------------------------<  83  4.1   |  22  |  2.83<H>    Ca    9.4      16 Aug 2022 06:10  Phos  3.7     08-16  Mg     2.40     08-16          CAPILLARY BLOOD GLUCOSE      POCT Blood Glucose.: 81 mg/dL (16 Aug 2022 17:52)  POCT Blood Glucose.: 130 mg/dL (16 Aug 2022 12:34)  POCT Blood Glucose.: 84 mg/dL (16 Aug 2022 08:32)  POCT Blood Glucose.: 160 mg/dL (15 Aug 2022 21:47)            MEDICATIONS  (STANDING):  amLODIPine   Tablet 10 milliGRAM(s) Oral daily  aspirin  chewable 81 milliGRAM(s) Oral daily  atorvastatin 20 milliGRAM(s) Oral at bedtime  calcium acetate 667 milliGRAM(s) Oral three times a day with meals  dextrose 5%. 1000 milliLiter(s) (100 mL/Hr) IV Continuous <Continuous>  dextrose 5%. 1000 milliLiter(s) (50 mL/Hr) IV Continuous <Continuous>  dextrose 50% Injectable 25 Gram(s) IV Push once  dextrose 50% Injectable 12.5 Gram(s) IV Push once  dextrose 50% Injectable 25 Gram(s) IV Push once  glucagon  Injectable 1 milliGRAM(s) IntraMuscular once  heparin   Injectable 5000 Unit(s) SubCutaneous every 12 hours  insulin glargine Injectable (LANTUS) 6 Unit(s) SubCutaneous at bedtime  insulin lispro (ADMELOG) corrective regimen sliding scale   SubCutaneous three times a day before meals  insulin lispro Injectable (ADMELOG) 3 Unit(s) SubCutaneous three times a day before meals  metoprolol tartrate 12.5 milliGRAM(s) Oral two times a day  sodium bicarbonate 650 milliGRAM(s) Oral three times a day  sodium chloride 0.9%. 1000 milliLiter(s) (100 mL/Hr) IV Continuous <Continuous>  tamsulosin 0.4 milliGRAM(s) Oral at bedtime    MEDICATIONS  (PRN):  dextrose Oral Gel 15 Gram(s) Oral once PRN Blood Glucose LESS THAN 70 milliGRAM(s)/deciliter          PHYSICAL EXAM:  GENERAL: NAD, well-groomed, well-developed  HEAD:  Atraumatic, Normocephalic  CHEST/LUNG: Clear to percussion bilaterally; No rales, rhonchi, wheezing, or rubs  HEART: Regular rate and rhythm; No murmurs, rubs, or gallops  ABDOMEN: Soft, Nontender, Nondistended; Bowel sounds present  EXTREMITIES:  2+ Peripheral Pulses, No clubbing, cyanosis, or edema  LYMPH: No lymphadenopathy noted  SKIN: No rashes or lesions    Care Discussed with Consultants/Other Providers [ ] YES  [ ] NO

## 2022-08-16 NOTE — PROGRESS NOTE ADULT - ASSESSMENT
71M w/hx CKD, HTN, HLD, DM, referred to the ED by his PCP (Dr. Steffen Rene) for recurrent syncope over the last week. Nephrology consulted for JUNI on CKD stage 4.     JUNI on CKD stage 4 vs ckd stage 4  baseline ~ 2.4-2.5 follows up with dr grace in office  last visit in 7/13 showed scr worsen to 2.88  scr slightly fluctuating, likely this is new baseline  Renal function stable today  avoid nephrotoxic agents  monitor bmp    Proteinuria  known  vasculitis work up neg done in office  check urine p/c raito  off ACEI and ARB     HTN  controlled  monitor    Hypokalemia  Improved  supplement as needed  monitor    syncope  work up per team 71M w/hx CKD, HTN, HLD, DM, referred to the ED by his PCP (Dr. Steffen Rene) for recurrent syncope over the last week. Nephrology consulted for JUNI on CKD stage 4.     ckd stage 4  baseline ~ 2.4-2.5 follows up with dr grace in office  last visit in 7/13 showed scr worsen to 2.88  scr slightly fluctuating, likely this is new baseline  Renal function stable today  avoid nephrotoxic agents  monitor bmp    Proteinuria  known  vasculitis work up neg done in office  check urine p/c raito  off ACEI and ARB     HTN  controlled  monitor    Hypokalemia  Improved  supplement as needed  monitor    syncope  work up per team

## 2022-08-16 NOTE — PROGRESS NOTE ADULT - SUBJECTIVE AND OBJECTIVE BOX
Neurology Progress Note    S: Patient seen and examined. No new events overnight. patient denied CP, SOB, HA or pain.  EEG prelim neg     Medication:  MEDICATIONS  (STANDING):  amLODIPine   Tablet 10 milliGRAM(s) Oral daily  aspirin  chewable 81 milliGRAM(s) Oral daily  atorvastatin 20 milliGRAM(s) Oral at bedtime  calcium acetate 667 milliGRAM(s) Oral three times a day with meals  dextrose 5%. 1000 milliLiter(s) (100 mL/Hr) IV Continuous <Continuous>  dextrose 5%. 1000 milliLiter(s) (50 mL/Hr) IV Continuous <Continuous>  dextrose 50% Injectable 25 Gram(s) IV Push once  dextrose 50% Injectable 12.5 Gram(s) IV Push once  dextrose 50% Injectable 25 Gram(s) IV Push once  glucagon  Injectable 1 milliGRAM(s) IntraMuscular once  heparin   Injectable 5000 Unit(s) SubCutaneous every 12 hours  insulin glargine Injectable (LANTUS) 9 Unit(s) SubCutaneous at bedtime  insulin lispro (ADMELOG) corrective regimen sliding scale   SubCutaneous three times a day before meals  insulin lispro Injectable (ADMELOG) 3 Unit(s) SubCutaneous three times a day before meals  metoprolol tartrate 12.5 milliGRAM(s) Oral two times a day  sodium bicarbonate 650 milliGRAM(s) Oral three times a day  tamsulosin 0.4 milliGRAM(s) Oral at bedtime    MEDICATIONS  (PRN):  dextrose Oral Gel 15 Gram(s) Oral once PRN Blood Glucose LESS THAN 70 milliGRAM(s)/deciliter        Vitals:    Vital Signs Last 24 Hrs    Vital Signs Last 24 Hrs  T(C): 36.8 (16 Aug 2022 05:10), Max: 37 (15 Aug 2022 13:15)  T(F): 98.2 (16 Aug 2022 05:10), Max: 98.6 (15 Aug 2022 13:15)  HR: 66 (16 Aug 2022 05:10) (56 - 69)  BP: 138/69 (16 Aug 2022 05:10) (127/70 - 141/61)  BP(mean): --  RR: 18 (16 Aug 2022 05:10) (18 - 18)  SpO2: 96% (16 Aug 2022 05:10) (96% - 99%)    Parameters below as of 16 Aug 2022 05:10  Patient On (Oxygen Delivery Method): room air          General Exam:   General Appearance: Appropriately dressed and in no acute distress       Head: Normocephalic, atraumatic and no dysmorphic features  Ear, Nose, and Throat: Moist mucous membranes  CVS: S1S2+  Resp: No SOB, no wheeze or rhonchi  Abd: soft NTND  Extremities: No edema, no cyanosis  Skin: No bruises, no rashes     Neurological Exam:  Mental Status: Awake, alert and oriented x 3.  Able to follow simple and complex verbal commands. Able to name and repeat. fluent speech. No obvious aphasia or dysarthria noted.   Cranial Nerves: PERRL, EOMI, VFFC, sensation V1-V3 intact,  no obvious facial asymmetry, equal elevation of palate, scm/trap 5/5, tongue is midline on protrusion. no obvious papilledema on fundoscopic exam. hearing is grossly intact.   Motor: Normal bulk, tone and strength throughout. Fine finger movements were intact and symmetric. no tremors or drift noted.    Sensation: Intact to light touch and pinprick throughout. no right/left confusion. no extinction to tactile on DSS.   Reflexes: 1+ throughout at biceps, brachioradialis, triceps, patellars and ankles bilaterally and equal. No clonus. R toe and L toe were both downgoing.  Coordination: No dysmetria on FNF or HKS  Gait:deferred     I personally reviewed the below data/images/labs:  CBC Full  -  ( 16 Aug 2022 06:10 )  WBC Count : 9.02 K/uL  RBC Count : 4.14 M/uL  Hemoglobin : 11.4 g/dL  Hematocrit : 35.4 %  Platelet Count - Automated : 207 K/uL  Mean Cell Volume : 85.5 fL  Mean Cell Hemoglobin : 27.5 pg  Mean Cell Hemoglobin Concentration : 32.2 gm/dL  Auto Neutrophil # : x  Auto Lymphocyte # : x  Auto Monocyte # : x  Auto Eosinophil # : x  Auto Basophil # : x  Auto Neutrophil % : x  Auto Lymphocyte % : x  Auto Monocyte % : x  Auto Eosinophil % : x  Auto Basophil % : x    08-16    140  |  105  |  43<H>  ----------------------------<  83  4.1   |  22  |  2.83<H>    Ca    9.4      16 Aug 2022 06:10  Phos  3.7     08-16  Mg     2.40     08-16        < from: CT Head No Cont (08.10.22 @ 14:29) >    ACC: [id number] EXAM:  CT BRAIN                          PROCEDURE DATE:  08/10/2022          INTERPRETATION:  Clinical indication: Syncope.    Multiple axial sections were performed from base of skull to vertex   without contrast enhancement coronaland sagittal reconstructions were   performed as well.    Parenchymal volume loss and chronic microvascular ischemic changes are   identified    There is no acute hemorrhage mass or mass effect seen.    Evaluation of the osseous structures with the appropriate window appears   unremarkable    The visualized paranasal sinuses mastoid and middle ear regions appear   clear.    IMPRESSION: No evidence of acute hemorrhage, mass or mass effect.    --- End of Report ---      Orthostatic VS  < from: Transthoracic Echocardiogram (08.11.22 @ 15:27) >    Patient name: RAJEEV WEISS  YOB: 1950   Age: 71 (M)   MR#: [id number]  Study Date: 8/11/2022  Location: [address] [place] [address] off tele Sonographer: CELESTE Ortega  Study quality: Technically good  Referring Physician: Em Hernandez MD  Blood Pressure: 150/75 mmHg  Height: 162 cm  Weight: 62 kg  BSA: 1.7 m2  ------------------------------------------------------------------------  PROCEDURE: Transthoracic echocardiogram with 2-D, M-Mode  and complete spectral and color flow Doppler.  INDICATION: Syncope and collapse (R55)  ------------------------------------------------------------------------  DIMENSIONS:  Dimensions:     Normal Values:  LA:     3.5 cm    2.0 - 4.0 cm  Ao:     3.5 cm    2.0 - 3.8 cm  SEPTUM: 0.7 cm    0.6 - 1.2 cm  PWT:    0.7 cm    0.6 - 1.1 cm  LVIDd:  4.8 cm    3.0 - 5.6 cm  LVIDs:  3.3 cm    1.8 - 4.0 cm  Derived Variables:  LVMI: 64 g/m2  RWT: 0.29  Fractional short: 31 %  Ejection Fraction (Modified Garcia Rule): 50 %  ------------------------------------------------------------------------  OBSERVATIONS:  Mitral Valve: Mitral annular calcification, otherwise  normal mitral valve. Mild mitral regurgitation.  Aortic Root: Normal aortic root.  Aortic Valve: Calcified trileaflet aortic valve with normal  opening.  Left Atrium: Mildly dilated left atrium.  LA volume index =  35 cc/m2.  Left Ventricle: Mild global left ventricular systolic  dysfunction. Normal left ventricular internal dimensions  and wall thicknesses. Mild diastolic dysfunction (Stage I).  Right Heart: Normal right atrium. Normal right ventricular  size and function. Normal tricuspid valve.  Minimal  tricuspid regurgitation. Normal pulmonic valve.  Pericardium/PleuraNormal pericardium with no pericardial  effusion.  ------------------------------------------------------------------------  CONCLUSIONS:  1. Mitral annular calcification, otherwise normal mitral  valve. Mild mitral regurgitation.  2. Mildly dilated left atrium.  LA volume index = 35 cc/m2.  3. Normal left ventricular internal dimensions and wall  thicknesses.  4. Mild global left ventricular systolic dysfunction.  5. Mild diastolic dysfunction (Stage I).  6. Normal right ventricular size and function.  ------------------------------------------------------------------------  Confirmed on  8/11/2022 - 16:52:40 by Edgard French M.D.,  [facility]  ------------------------------------------------------------------------    < end of copied text >      Orthostatic VS  < from: MR Angio Head No Cont (08.15.22 @ 11:46) >    ACC: [id number] EXAM:  MR ANGIO NECK                        ACC: [id number] EXAM:  MR ANGIO BRAIN                        ACC: [id number] EXAM:  MR BRAIN                          PROCEDURE DATE:  08/15/2022          INTERPRETATION:  Noncontrast MRI of thebrain, MRA of the brain, and MRA   of the neck.    CLINICAL INDICATION: Seizure like activity. Syncope.    TECHNIQUE: Multiplanar, multisequence MR images of the brain, 3-D   time-of-flight images of the brain, and 2-D and 3-D time of flight images   of the neck were obtained without the intravenous administration of   contrast. Special attention was paid to the temporal lobes.    Time-of-flight images were reformatted using MIP protocol targeted over   the head and neck.    COMPARISON: CT brain 8/10/2022.    FINDINGS:    MRI BRAIN:    Study is limited by motion.    No hydrocephalus, midline shift, mass effect, vasogenic edema, or acute   intracranial hemorrhage.  Diffusion weighted images demonstrate no acute   territorial infarct.    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 Mild white matter   microvascular ischemic disease.    Hippocampal gyri are grossly symmetric in size and signal characteristics.    Flow voids are seen within the major intracranial vessels consistentwith   their patency.    Left greater than right maxillary, right sphenoid, and scattered ethmoid   sinus mucosal thickening. Remaining visualized paranasal sinuses and   mastoid air cells are clear.    Orbits, sellar and suprasellar structures, and craniocervical junction   are unremarkable.    MRA BRAIN:    Normal flow-related signal within the skull base/intracranial internal   carotid arteries.    Normal flow-related signal within the bilateral anterior and middle   cerebral arteries. Anterior circulation demonstrates a trifurcate   appearance.    Multifocal short segment stenoses involving the bilateral posterior   cerebral arteries, without occlusion.    Normal flow-related signal within the intradural vertebral arteries and   the basilar artery.    No vascular aneurysm.    MRA NECK:    Normal flow-related signal within the bilateral common and internal   carotid arteries.    Normal flow-related signal within the bilateral vertebral arteries.    No flow-limiting stenosis or vascularaneurysm.  No evidence for arterial   dissection.    IMPRESSION:    MRI BRAIN:  Limited by motion    No hydrocephalus, mass effect, acute intracranial hemorrhage, vasogenic   edema, or acute territorial infarct.    No gross evidence for mesial temporal sclerosis, gray matter heterotopia,   or cortical lesion.    MRA BRAIN:  Multifocal short segment stenoses involving the bilateral posterior   cerebral arteries, without occlusion.    Otherwise no flow-limiting stenosis.  No vascular aneurysm.    MRA NECK:  No flow-limiting stenosis or vascular aneurysm.  No evidence for arterial dissection.                --- End of Report ---        Orthostatic VS    08-15-22 @ 21:12  Lying BP: 138/65 HR: 63   Sitting BP: 143/65 HR: 66  Standing BP: 117/66 HR: 76  Site: --   Mode: --

## 2022-08-16 NOTE — CHART NOTE - NSCHARTNOTEFT_GEN_A_CORE
Notified by RN , pt wants to stop IVF.  Patient seen at  bedside, expressing concern for too much IV fluids have been given, states he was told by his renal doctor Dr Chowdary, not to exceed more 1.5L per day. He reports improvement in his symptoms ( no more dizziness when stands up at this time) wants to stop continuos IVF and monitor, denies any CP, no SOB. Provider at bedside discussed with patient at length the need for possible resuming IVF in case changes in BPs/ Orthostatics, pt is in agreement, will dc IVF and c/t monitor. Repeat orthostatic BP improving.    145/76  --- 67  149/ 62 --- 70  141/75 --- 70
EEG preliminary read (not final) on the initial recording hour(s) = 0.5 hours     Normal awake and drowsy EEG   No seizures recorded.    [facility] EEG Reading Room Ph#: [phone]  Epilepsy Answering Service after 5PM and before 8:30AM: Ph#: [phone]

## 2022-08-16 NOTE — PROGRESS NOTE ADULT - ASSESSMENT
70yo Croatian M with DM, HTN, HLD, and CKD who presented after 1 episode of syncope 1 day prior to presentation. He stated that he had walked down a flight of stairs and out of a building when he felt a sudden lightheadedness lasting maybe 30 seconds, associated with a trembling sensation in his upper extremities bilaterally, and fainted. no head trauma. + LOC, + incontinence, no tongue bite, a bit confused shortly after, now baseline. states dizzy when stands  CTH unremarkable   TTE as above  MRI brain unremarkable.   MRA H/N with multifocal stenosis B/L PCAs   + orthostatics   EEG prelim neg   Impression: Syncope more likely than seizure. + orthostatics   - Can d/c eeg ; f/u official report   -  orthostatics +--> can trend ; needs hydration   - cardiac workup, recs appreciated   - c/w asa and statin therapy   - PT/OT--> no needs   - check FS, glucose control <180  - GI/DVT ppx  - Thank you for allowing me to participate in the care of this patient. Call with questions.   - spoke with ACP   - d/c plan when able. no objection from neuro standpoint   Jason Rosales MD  Vascular Neurology  Office: [phone]

## 2022-08-16 NOTE — PROGRESS NOTE ADULT - ASSESSMENT
Problem/Plan - 1:  ·  Problem: Syncope and collapse.   ·  Plan: - Patient with 1 episode of sudden onset lightheadedness and dizziness, with b/l UE shaking, and followed by period of confusion; associated with urinary incontinence  - Most likely seizure activity   – first occurrence  - Obtain video EEG  - neuro fu   - MRI neg     Problem/Plan - 2:·  Problem: T2DM (type 2 diabetes mellitus).   ·  Plan: - Home regimen: insulin detemir 15U subq before lunch once daily; farxiga  - fingersticks qachs     Problem/Plan - 3:  ·  Problem: Normocytic anemia.   ·  Plan: - Hb 11.5 on presentation – check ferritin, iron level, iron saturation, transferrin, TIBC  - Trend CBC for now  - Likely as a result of CKD.    Problem/Plan - 4:  ·  Problem: Hypokalemia.     ·  Plan: - K 3.4 on presentation, replenish as warranted.  Problem/Plan - 5:  ·  Problem: Stage 4 chronic kidney disease.   ·  Plan: - Cr 2.8 on presentation – prior known baseline 2.4-2.5 outpatient  - Progression vs JUNI  - Continue home calcium acetate, sodium bicarbonate tabs- Nephrology following.    Problem/Plan - 6:  ·  Problem: Elevated brain natriuretic peptide (BNP) level.   ·  Plan: - proBNP 715 on presentation – no known baseline  - CXR unremarkable.    Problem/Plan - 7:  ·  Problem: HTN (hypertension).   ·  Plan: - Goal normotension – resume home meds.  Problem/Plan - 8:  ·  Problem: Prophylactic measure.

## 2022-08-16 NOTE — EEG REPORT - NS EEG TEXT BOX
[facility]  Comprehensive Epilepsy Center  Report of Continuous Video EEG    [facility]: [address], [place] [address], Phone [phone]  [place] Office: [address], [address], [place] [address] Phone [phone]    [facility]: [address] [place] [address], Phone [phone]  [place] Office: [address] [place] [address], Phone [phone]    Patient Name: RAJEEV WEISS     Age: 71 year, : 1950  Patient ID: -, MRN #: -, Nesbitt: 402 A -  Referring Physician: -  EEG #: [id number]    Study Time/Date: 2:45:41 PM on 8/15/2022  	  End Time/Date: 0800 on           			   Duration: 17H    Study Information:    EEG Recording Technique:  The patient underwent continuous Video-EEG monitoring, using Telemetry System hardware on the XLTek Digital System. EEG and video data were stored on a computer hard drive with important events saved in digital archive files. The material was reviewed by a physician (electroencephalographer / epileptologist) on a daily basis. Riki and seizure detection algorithms were utilized and reviewed. An EEG Technician attended to the patient, and was available throughout daytime work hours.  The epilepsy center neurologist was available in person or on call 24-hours per day.    EEG Placement and Labeling of Electrodes:  The EEG was performed utilizing 20 channel referential EEG connections (coronal over temporal over parasagittal montage) using all standard 10-20 electrode placements, with additional electrodes placed in the inferior temporal region using the modified 10-10 montage electrode placements for elective admissions, or if deemed necessary. Recording was at a sampling rate of 256 samples per second per channel. Time synchronized digital video recording was done simultaneously with EEG recording. A low light infrared camera was used for low light recording.     History:   VEEG AT BEDSIDE 402 A   71 YEAR OLD MALE /  COR: AWAKE / DROWSY  P/W: SYNCOPE & COLLAPSE  PMH: DM, HTN, CKD  HV:NOT COMPLETED DUE TO PANDEMIC  PHOTIC:COMPLETED  A&OX 3  CONCERN FOR SEIZURE        Pertinent Medication  Flomax  Phoslo  Lopressor  Norvasc  Lipitor  AdmeLOG  Heparin  Aspirin    Interpretation:    Daily EEG Visual Analysis  Findings: The background was continuous and reactive. During wakefulness, the posterior dominant rhythm consisted of symmetric, well-modulated 9 Hz activity, with amplitude to 30 uV, that attenuated to eye opening.     Background Slowing:  No generalized background slowing was present.    Focal Slowing:   None were present.    Sleep Background:  Drowsiness was characterized by fragmentation, attenuation, and slowing of the background activity.    Sleep was characterized by the presence of vertex waves, symmetric sleep spindles and K-complexes.    Other Non-Epileptiform Findings:  None were present.    Interictal Epileptiform Activity:   None were present.    Events:  Clinical events: None recorded.  Seizures: None recorded.    Activation Procedures:   Hyperventilation was not performed.    Photic stimulation was not performed.     Artifacts:  Intermittent myogenic and movement artifacts were noted.    EEG Summary / Classification:  Normal EEG in the awake, drowsy and asleep states.    EEG Impression / Clinical Correlate:  Normal EEG study.  No epileptiform pattern or seizure seen.  **In absence of additional clinical concerns, recommend consideration for discontinuation of current EEG study with reconnection in future if warranted.    Herman Orozco MD  Attending Physician, [facility]   [facility]  Comprehensive Epilepsy Center  Report of Continuous Video EEG    [facility]: [address], [place] [address], Phone [phone]  [place] Office: [address], [address], [place] [address] Phone [phone]    [facility]: [address] [place] [address], Phone [phone]  [place] Office: [address] [place] [address], Phone [phone]    Patient Name: RAJEEV WEISS     Age: 71 year, : 1950  Patient ID: -, MRN #: -, Nesbitt: 402 A -  Referring Physician: -  EEG #: [id number]    Study Time/Date: 2:45:41 PM on 8/15/2022  	  End Time/Date: 1200 on           			   Duration: 21H    Study Information:    EEG Recording Technique:  The patient underwent continuous Video-EEG monitoring, using Telemetry System hardware on the XLTek Digital System. EEG and video data were stored on a computer hard drive with important events saved in digital archive files. The material was reviewed by a physician (electroencephalographer / epileptologist) on a daily basis. Riki and seizure detection algorithms were utilized and reviewed. An EEG Technician attended to the patient, and was available throughout daytime work hours.  The epilepsy center neurologist was available in person or on call 24-hours per day.    EEG Placement and Labeling of Electrodes:  The EEG was performed utilizing 20 channel referential EEG connections (coronal over temporal over parasagittal montage) using all standard 10-20 electrode placements, with additional electrodes placed in the inferior temporal region using the modified 10-10 montage electrode placements for elective admissions, or if deemed necessary. Recording was at a sampling rate of 256 samples per second per channel. Time synchronized digital video recording was done simultaneously with EEG recording. A low light infrared camera was used for low light recording.     History:   VEEG AT BEDSIDE 402 A   71 YEAR OLD MALE /  COR: AWAKE / DROWSY  P/W: SYNCOPE & COLLAPSE  PMH: DM, HTN, CKD  HV:NOT COMPLETED DUE TO PANDEMIC  PHOTIC:COMPLETED  A&OX 3  CONCERN FOR SEIZURE        Pertinent Medication  Flomax  Phoslo  Lopressor  Norvasc  Lipitor  AdmeLOG  Heparin  Aspirin    Interpretation:    Daily EEG Visual Analysis  Findings: The background was continuous and reactive. During wakefulness, the posterior dominant rhythm consisted of symmetric, well-modulated 9 Hz activity, with amplitude to 30 uV, that attenuated to eye opening.     Background Slowing:  No generalized background slowing was present.    Focal Slowing:   None were present.    Sleep Background:  Drowsiness was characterized by fragmentation, attenuation, and slowing of the background activity.    Sleep was characterized by the presence of vertex waves, symmetric sleep spindles and K-complexes.    Other Non-Epileptiform Findings:  None were present.    Interictal Epileptiform Activity:   None were present.    Events:  Clinical events: None recorded.  Seizures: None recorded.    Activation Procedures:   Hyperventilation was not performed.    Photic stimulation was not performed.     Artifacts:  Intermittent myogenic and movement artifacts were noted.    EEG Summary / Classification:  Normal EEG in the awake, drowsy and asleep states.    EEG Impression / Clinical Correlate:  Normal EEG study.  No epileptiform pattern or seizure seen.  **In absence of additional clinical concerns, recommend consideration for discontinuation of current EEG study with reconnection in future if warranted.    Herman Orozco MD  Attending Physician, [facility] Epilepsy [place]

## 2022-08-16 NOTE — PROGRESS NOTE ADULT - SUBJECTIVE AND OBJECTIVE BOX
[facility]   MD VELASQUEZ PERDOMO MD KRISTINE SOLTANPOUR, DO ANGELA WONG, PA    TEL:  OFFICE: [phone]  From 5pm-7am Answering Service [phone]    -- RENAL FOLLOW UP NOTE ---Date of Service 08-16-22 @ 11:00    Patient is a 71y old  Male who presents with a chief complaint of syncope (16 Aug 2022 08:46)      Patient seen and examined at bedside. No chest pain/sob    VITALS:  T(F): 98.2 (08-16-22 @ 05:10), Max: 98.6 (08-15-22 @ 13:15)  HR: 66 (08-16-22 @ 05:10)  BP: 138/69 (08-16-22 @ 05:10)  RR: 18 (08-16-22 @ 05:10)  SpO2: 96% (08-16-22 @ 05:10)  Wt(kg): --    08-15 @ 07:01  -  08-16 @ 07:00  --------------------------------------------------------  IN: 150 mL / OUT: 400 mL / NET: -250 mL          PHYSICAL EXAM:  General: NAD  Neck: No JVD  Respiratory: CTAB, no wheezes, rales or rhonchi  Cardiovascular: S1, S2, RRR  Gastrointestinal: BS+, soft, NT/ND  Extremities: No peripheral edema    Hospital Medications:   MEDICATIONS  (STANDING):  amLODIPine   Tablet 10 milliGRAM(s) Oral daily  aspirin  chewable 81 milliGRAM(s) Oral daily  atorvastatin 20 milliGRAM(s) Oral at bedtime  calcium acetate 667 milliGRAM(s) Oral three times a day with meals  dextrose 5%. 1000 milliLiter(s) (100 mL/Hr) IV Continuous <Continuous>  dextrose 5%. 1000 milliLiter(s) (50 mL/Hr) IV Continuous <Continuous>  dextrose 50% Injectable 25 Gram(s) IV Push once  dextrose 50% Injectable 12.5 Gram(s) IV Push once  dextrose 50% Injectable 25 Gram(s) IV Push once  glucagon  Injectable 1 milliGRAM(s) IntraMuscular once  heparin   Injectable 5000 Unit(s) SubCutaneous every 12 hours  insulin glargine Injectable (LANTUS) 6 Unit(s) SubCutaneous at bedtime  insulin lispro (ADMELOG) corrective regimen sliding scale   SubCutaneous three times a day before meals  insulin lispro Injectable (ADMELOG) 3 Unit(s) SubCutaneous three times a day before meals  metoprolol tartrate 12.5 milliGRAM(s) Oral two times a day  sodium bicarbonate 650 milliGRAM(s) Oral three times a day  tamsulosin 0.4 milliGRAM(s) Oral at bedtime      LABS:  08-16    140  |  105  |  43<H>  ----------------------------<  83  4.1   |  22  |  2.83<H>    Ca    9.4      16 Aug 2022 06:10  Phos  3.7     08-16  Mg     2.40     08-16      Creatinine Trend: 2.83 <--, 2.84 <--, 2.74 <--, 2.80 <--, 2.79 <--, 2.68 <--, 2.95 <--, 2.80 <--    Phosphorus Level, Serum: 3.7 mg/dL (08-16 @ 06:10)                              11.4   9.02  )-----------( 207      ( 16 Aug 2022 06:10 )             35.4     Urine Studies:  Urinalysis - [08-10-22 @ 14:10]      Color Light Yellow / Appearance Clear / SG 1.015 / pH 6.0      Gluc >= 1000 mg/dL / Ketone Negative  / Bili Negative / Urobili <2 mg/dL       Blood Negative / Protein 100 mg/dL / Leuk Est Negative / Nitrite Negative      RBC 0 / WBC 0 / Hyaline 2 / Gran  / Sq Epi  / Non Sq Epi 0 / Bacteria Negative    Urine Creatinine 41      [08-15-22 @ 05:55]  Urine Protein 46      [08-15-22 @ 05:55]    HbA1c 7.3      [10-06-17 @ 05:30]    HCV 0.10, Nonreact      [08-11-22 @ 07:06]      RADIOLOGY & ADDITIONAL STUDIES:

## 2022-08-17 ENCOUNTER — TRANSCRIPTION ENCOUNTER (OUTPATIENT)
Age: 72
End: 2022-08-17

## 2022-08-17 VITALS — WEIGHT: 145.95 LBS

## 2022-08-17 LAB — GLUCOSE BLDC GLUCOMTR-MCNC: 103 MG/DL — HIGH (ref 70–99)

## 2022-08-17 RX ORDER — AMLODIPINE BESYLATE 2.5 MG/1
1 TABLET ORAL
Qty: 0 | Refills: 0 | DISCHARGE

## 2022-08-17 RX ADMIN — AMLODIPINE BESYLATE 10 MILLIGRAM(S): 2.5 TABLET ORAL at 05:18

## 2022-08-17 RX ADMIN — HEPARIN SODIUM 5000 UNIT(S): 5000 INJECTION INTRAVENOUS; SUBCUTANEOUS at 05:16

## 2022-08-17 RX ADMIN — Medication 650 MILLIGRAM(S): at 05:17

## 2022-08-17 RX ADMIN — Medication 12.5 MILLIGRAM(S): at 05:18

## 2022-08-17 NOTE — DIETITIAN INITIAL EVALUATION ADULT - PERTINENT LABORATORY DATA
08-16 Na 140 mmol/L Glu 83 mg/dL K+ 4.1 mmol/L Cr 2.83 mg/dL<H> BUN 43 mg/dL<H> Phos 3.7 mg/dL  08-17 @ 08:13 POCT 103 mg/dL    POCT Blood Glucose.: 103 mg/dL (08-17-22 @ 08:13)

## 2022-08-17 NOTE — DISCHARGE NOTE PROVIDER - HOSPITAL COURSE
72 y/o male with PMHx of HTN, HLD, CKD and DM, sent by PCP for evaluation of recurrent syncope in the home over the last week.     1. Syncope  - EKG NSR without ectopy. Echo mild LV dysfunction. No role for ischemic evaluation at this time as per cardiology given CKD not on HD.   - Found to have orthostatic hypotension, likely 2/2 intravascular depletion. Resolved s/p IVF - further held 2/2 CKD hx.    - Compression stockings and slow changes in posture encouraged.   - CTH negative. MRI brain, MRA H/N with multifocal stenosis.   - EEG negative.   - To continue aspirin and statin therapy. Will discharge off of Amlodipine.   - No PT needs.     2. CKD IV   - New baseline likely ~2.88. Currently stable, outpatient follow up.     Patient seen and evaluated, no acute somatic complaints. Reviewed discharge medications with patient; All new medications requiring new prescriptions were sent to the pharmacy of patient's choice. Reviewed need for prescription for previous home medications and new prescriptions sent if requested. Medically cleared/stable for discharge as per Dr. Hernandez with close outpatient follow up within 1-2 weeks of discharge. Patient understands and agrees with plan of care.

## 2022-08-17 NOTE — DIETITIAN INITIAL EVALUATION ADULT - PERTINENT MEDS FT
MEDICATIONS  (STANDING):  aspirin  chewable 81 milliGRAM(s) Oral daily  atorvastatin 20 milliGRAM(s) Oral at bedtime  calcium acetate 667 milliGRAM(s) Oral three times a day with meals  dextrose 5%. 1000 milliLiter(s) (100 mL/Hr) IV Continuous <Continuous>  dextrose 5%. 1000 milliLiter(s) (50 mL/Hr) IV Continuous <Continuous>  dextrose 50% Injectable 25 Gram(s) IV Push once  dextrose 50% Injectable 12.5 Gram(s) IV Push once  dextrose 50% Injectable 25 Gram(s) IV Push once  glucagon  Injectable 1 milliGRAM(s) IntraMuscular once  heparin   Injectable 5000 Unit(s) SubCutaneous every 12 hours  insulin glargine Injectable (LANTUS) 6 Unit(s) SubCutaneous at bedtime  insulin lispro (ADMELOG) corrective regimen sliding scale   SubCutaneous three times a day before meals  insulin lispro Injectable (ADMELOG) 3 Unit(s) SubCutaneous three times a day before meals  metoprolol tartrate 12.5 milliGRAM(s) Oral two times a day  sodium bicarbonate 650 milliGRAM(s) Oral three times a day  tamsulosin 0.4 milliGRAM(s) Oral at bedtime    MEDICATIONS  (PRN):  dextrose Oral Gel 15 Gram(s) Oral once PRN Blood Glucose LESS THAN 70 milliGRAM(s)/deciliter

## 2022-08-17 NOTE — DIETITIAN INITIAL EVALUATION ADULT - ORAL INTAKE PTA/DIET HISTORY
Pt lives at home with wife . They cook together at home. No difficulty obtaining groceries. No specific diet followed PTA. No supplements/Vitamins taken PTA.

## 2022-08-17 NOTE — PROGRESS NOTE ADULT - PROVIDER SPECIALTY LIST ADULT
Cardiology
Cardiology
Hospitalist
Hospitalist
Nephrology
Cardiology
Hospitalist
Nephrology
Neurology
Cardiology
Hospitalist
Nephrology
Hospitalist
Hospitalist
Nephrology
Nephrology
Neurology
Neurology

## 2022-08-17 NOTE — PROGRESS NOTE ADULT - SUBJECTIVE AND OBJECTIVE BOX
[facility]   MD VELASQUEZ PERDOMO MD KRISTINE SOLTANPOUR, DO ANGELA WONG, PA    TEL:  OFFICE: [phone]  From 5pm-7am Answering Service [phone]    -- RENAL FOLLOW UP NOTE ---Date of Service 08-17-22 @ 10:52    Patient is a 71y old  Male who presents with a chief complaint of syncope (17 Aug 2022 08:06)      Patient seen and examined at bedside. No chest pain/sob    VITALS:  T(F): 97.7 (08-17-22 @ 05:15), Max: 97.9 (08-16-22 @ 14:00)  HR: 62 (08-17-22 @ 05:15)  BP: 132/58 (08-17-22 @ 05:15)  RR: 18 (08-17-22 @ 05:15)  SpO2: 100% (08-17-22 @ 05:15)  Wt(kg): --        PHYSICAL EXAM:  General: NAD  Neck: No JVD  Respiratory: CTAB, no wheezes, rales or rhonchi  Cardiovascular: S1, S2, RRR  Gastrointestinal: BS+, soft, NT/ND  Extremities: No peripheral edema    Hospital Medications:   MEDICATIONS  (STANDING):  aspirin  chewable 81 milliGRAM(s) Oral daily  atorvastatin 20 milliGRAM(s) Oral at bedtime  calcium acetate 667 milliGRAM(s) Oral three times a day with meals  dextrose 5%. 1000 milliLiter(s) (100 mL/Hr) IV Continuous <Continuous>  dextrose 5%. 1000 milliLiter(s) (50 mL/Hr) IV Continuous <Continuous>  dextrose 50% Injectable 25 Gram(s) IV Push once  dextrose 50% Injectable 12.5 Gram(s) IV Push once  dextrose 50% Injectable 25 Gram(s) IV Push once  glucagon  Injectable 1 milliGRAM(s) IntraMuscular once  heparin   Injectable 5000 Unit(s) SubCutaneous every 12 hours  insulin glargine Injectable (LANTUS) 6 Unit(s) SubCutaneous at bedtime  insulin lispro (ADMELOG) corrective regimen sliding scale   SubCutaneous three times a day before meals  insulin lispro Injectable (ADMELOG) 3 Unit(s) SubCutaneous three times a day before meals  metoprolol tartrate 12.5 milliGRAM(s) Oral two times a day  sodium bicarbonate 650 milliGRAM(s) Oral three times a day  tamsulosin 0.4 milliGRAM(s) Oral at bedtime      LABS:  08-16    140  |  105  |  43<H>  ----------------------------<  83  4.1   |  22  |  2.83<H>    Ca    9.4      16 Aug 2022 06:10  Phos  3.7     08-16  Mg     2.40     08-16      Creatinine Trend: 2.83 <--, 2.84 <--, 2.74 <--, 2.80 <--, 2.79 <--, 2.68 <--, 2.95 <--                                11.4   9.02  )-----------( 207      ( 16 Aug 2022 06:10 )             35.4     Urine Studies:  Urinalysis - [08-10-22 @ 14:10]      Color Light Yellow / Appearance Clear / SG 1.015 / pH 6.0      Gluc >= 1000 mg/dL / Ketone Negative  / Bili Negative / Urobili <2 mg/dL       Blood Negative / Protein 100 mg/dL / Leuk Est Negative / Nitrite Negative      RBC 0 / WBC 0 / Hyaline 2 / Gran  / Sq Epi  / Non Sq Epi 0 / Bacteria Negative    Urine Creatinine 41      [08-15-22 @ 05:55]  Urine Protein 46      [08-15-22 @ 05:55]    HbA1c 7.3      [10-06-17 @ 05:30]    HCV 0.10, Nonreact      [08-11-22 @ 07:06]      RADIOLOGY & ADDITIONAL STUDIES:

## 2022-08-17 NOTE — PROGRESS NOTE ADULT - ASSESSMENT
71M w/hx CKD, HTN, HLD, DM, referred to the ED by his PCP (Dr. Steffen Rene) for recurrent syncope over the last week. Nephrology consulted for JUNI on CKD stage 4.     ckd stage 4  baseline ~ 2.4-2.5 follows up with dr grace in office  last visit in 7/13 showed scr worsen to 2.88  scr slightly fluctuating, likely this is new baseline  Renal function stable today  avoid nephrotoxic agents  monitor bmp    Proteinuria  known  vasculitis work up neg done in office  check urine p/c raito  off ACEI and ARB     HTN  controlled  monitor    Hypokalemia  Improved  supplement as needed  monitor    syncope  work up per team    acidosis  wnl   continue bicarb tid  monitor    hyperphos  on phoslo tid  phos wnl  continue binder  monitor

## 2022-08-17 NOTE — DISCHARGE NOTE PROVIDER - NSDCFUADDAPPT_GEN_ALL_CORE_FT
Follow up with PCP within 1-2 weeks of discharge. If you are in need of a general medicine physician and post-discharge medical follow-up for further care/recommendations you may contact the [facility] for an appointment at [phone].     Follow up with cardiology within 1-2 weeks of discharge. If you are in need of a general cardiologist after discharge, you may contact the [facility] for an appointment at [phone].

## 2022-08-17 NOTE — DISCHARGE NOTE PROVIDER - NSDCMRMEDTOKEN_GEN_ALL_CORE_FT
aspirin 81 mg oral tablet: 1 tab(s) orally once a day  atorvastatin 20 mg oral tablet: 1 tab(s) orally once a day  calcium acetate 667 mg oral capsule: 1 cap(s) orally 3 times a day (with meals)  famotidine 40 mg oral tablet: 1 tab(s) orally once a day (at bedtime)  insulin detemir 100 units/mL subcutaneous solution: 15 unit(s) subcutaneous once a day  metoprolol succinate 25 mg oral tablet, extended release: 1 tab(s) orally once a day  sodium bicarbonate 650 mg oral tablet: 1 tab(s) orally 3 times a day  tamsulosin 0.4 mg oral capsule: 1 cap(s) orally once a day  Vitamin D3: 1 tab(s) orally once a day

## 2022-08-17 NOTE — DIETITIAN INITIAL EVALUATION ADULT - OTHER INFO
Medical course: Per chart 71 year old male w/hx CKD, HTN, HLD, DM, referred to the ED by his PCP for recurrent syncope over the last week. Nephrology consulted for JUNI on CKD stage 4.     Nutrition interview: No recent episodes of nausea, vomiting, diarrhea or constipation. Denies any chewing/swallowing difficulties. No food allergies. Stated UBW: 150lbs. Denies any weight loss or changes in appetite. Food preferences explored and noted. Intake is % per RN flowsheets and per pt. Feeding skills: independent. Currently on phos binder. nutrition related labs currently within normal parameters. Patient very knowledgeable of renal diet and declined further education at this time.

## 2022-08-17 NOTE — DISCHARGE NOTE NURSING/CASE MANAGEMENT/SOCIAL WORK - NSDCFUADDAPPT_GEN_ALL_CORE_FT
Follow up with PCP within 1-2 weeks of discharge. If you are in need of a general medicine physician and post-discharge medical follow-up for further care/recommendations you may contact the [facility] for an appointment at [phone].     Follow up with cardiology within 1-2 weeks of discharge. If you are in need of a general cardiologist after discharge, you may contact the [facility] for an appointment at [phone].

## 2022-08-17 NOTE — PROGRESS NOTE ADULT - SUBJECTIVE AND OBJECTIVE BOX
Cardiovascular Disease Progress Note    Overnight events: No acute events overnight.    Patient denies chest pain or SOB.   Otherwise review of systems negative    Objective Findings:  T(C): 36.5 (08-17-22 @ 05:15), Max: 36.6 (08-16-22 @ 11:33)  HR: 62 (08-17-22 @ 05:15) (62 - 74)  BP: 132/58 (08-17-22 @ 05:15) (120/89 - 145/76)  RR: 18 (08-17-22 @ 05:15) (15 - 18)  SpO2: 100% (08-17-22 @ 05:15) (96% - 100%)  Wt(kg): --  Daily     Daily       Physical Exam:  Gen: NAD; Patient resting comfortably  HEENT: EOMI, Normocephalic/ atraumatic  CV: RRR, normal S1 + S2, no m/r/g  Lungs:  Normal respiratory effort; clear to auscultation bilaterally  Abd: soft, non-tender; bowel sounds present  Ext: No edema; warm and well perfused    Telemetry: Sinus; no ectopy    Laboratory Data:                        11.4   9.02  )-----------( 207      ( 16 Aug 2022 06:10 )             35.4     08-16    140  |  105  |  43<H>  ----------------------------<  83  4.1   |  22  |  2.83<H>    Ca    9.4      16 Aug 2022 06:10  Phos  3.7     08-16  Mg     2.40     08-16                Inpatient Medications:  MEDICATIONS  (STANDING):  amLODIPine   Tablet 10 milliGRAM(s) Oral daily  aspirin  chewable 81 milliGRAM(s) Oral daily  atorvastatin 20 milliGRAM(s) Oral at bedtime  calcium acetate 667 milliGRAM(s) Oral three times a day with meals  dextrose 5%. 1000 milliLiter(s) (100 mL/Hr) IV Continuous <Continuous>  dextrose 5%. 1000 milliLiter(s) (50 mL/Hr) IV Continuous <Continuous>  dextrose 50% Injectable 25 Gram(s) IV Push once  dextrose 50% Injectable 12.5 Gram(s) IV Push once  dextrose 50% Injectable 25 Gram(s) IV Push once  glucagon  Injectable 1 milliGRAM(s) IntraMuscular once  heparin   Injectable 5000 Unit(s) SubCutaneous every 12 hours  insulin glargine Injectable (LANTUS) 6 Unit(s) SubCutaneous at bedtime  insulin lispro (ADMELOG) corrective regimen sliding scale   SubCutaneous three times a day before meals  insulin lispro Injectable (ADMELOG) 3 Unit(s) SubCutaneous three times a day before meals  metoprolol tartrate 12.5 milliGRAM(s) Oral two times a day  sodium bicarbonate 650 milliGRAM(s) Oral three times a day  tamsulosin 0.4 milliGRAM(s) Oral at bedtime      Assessment: 71 year old man with IDDM, HTN, HLD, and CKD IV presents with syncope.     Plan of Care:    #Syncope-  Likely vasovagal etiology given the associated prodrome prior to passing out.  EKG is sinus and no ectopy noted on telemetry thus far.  Echocardiogram reviewed- mild LV dysfunction.  I would hold off on ischemic evaluation at this time given advanced renal disease not on HD.    #Orthostatic hypotension-  Now resolved post IVF.   Hold off on further IVF given CKD.     #HTN-  BP is currently acceptable.    #CKD IV-  Renal input noted.         Over 25 minutes spent on total encounter; more than 50% of the visit was spent counseling and/or coordinating care by the attending physician.      Bc Kang MD [facility]  Cardiovascular Disease  [phone] Cardiovascular Disease Progress Note    Overnight events: No acute events overnight.    Patient denies chest pain or SOB.   No further dizziness.   Otherwise review of systems negative    Objective Findings:  T(C): 36.5 (08-17-22 @ 05:15), Max: 36.6 (08-16-22 @ 11:33)  HR: 62 (08-17-22 @ 05:15) (62 - 74)  BP: 132/58 (08-17-22 @ 05:15) (120/89 - 145/76)  RR: 18 (08-17-22 @ 05:15) (15 - 18)  SpO2: 100% (08-17-22 @ 05:15) (96% - 100%)  Wt(kg): --  Daily     Daily       Physical Exam:  Gen: NAD; Patient resting comfortably  HEENT: EOMI, Normocephalic/ atraumatic  CV: RRR, normal S1 + S2, no m/r/g  Lungs:  Normal respiratory effort; clear to auscultation bilaterally  Abd: soft, non-tender; bowel sounds present  Ext: No edema; warm and well perfused    Telemetry: Sinus; no ectopy    Laboratory Data:                        11.4   9.02  )-----------( 207      ( 16 Aug 2022 06:10 )             35.4     08-16    140  |  105  |  43<H>  ----------------------------<  83  4.1   |  22  |  2.83<H>    Ca    9.4      16 Aug 2022 06:10  Phos  3.7     08-16  Mg     2.40     08-16                Inpatient Medications:  MEDICATIONS  (STANDING):  amLODIPine   Tablet 10 milliGRAM(s) Oral daily  aspirin  chewable 81 milliGRAM(s) Oral daily  atorvastatin 20 milliGRAM(s) Oral at bedtime  calcium acetate 667 milliGRAM(s) Oral three times a day with meals  dextrose 5%. 1000 milliLiter(s) (100 mL/Hr) IV Continuous <Continuous>  dextrose 5%. 1000 milliLiter(s) (50 mL/Hr) IV Continuous <Continuous>  dextrose 50% Injectable 25 Gram(s) IV Push once  dextrose 50% Injectable 12.5 Gram(s) IV Push once  dextrose 50% Injectable 25 Gram(s) IV Push once  glucagon  Injectable 1 milliGRAM(s) IntraMuscular once  heparin   Injectable 5000 Unit(s) SubCutaneous every 12 hours  insulin glargine Injectable (LANTUS) 6 Unit(s) SubCutaneous at bedtime  insulin lispro (ADMELOG) corrective regimen sliding scale   SubCutaneous three times a day before meals  insulin lispro Injectable (ADMELOG) 3 Unit(s) SubCutaneous three times a day before meals  metoprolol tartrate 12.5 milliGRAM(s) Oral two times a day  sodium bicarbonate 650 milliGRAM(s) Oral three times a day  tamsulosin 0.4 milliGRAM(s) Oral at bedtime      Assessment: 71 year old man with IDDM, HTN, HLD, and CKD IV presents with syncope.     Plan of Care:    #Syncope-  Likely vasovagal etiology given the associated prodrome prior to passing out.  EKG is sinus and no ectopy noted on telemetry thus far.  Echocardiogram reviewed- mild LV dysfunction.  I would hold off on ischemic evaluation at this time given advanced renal disease not on HD.    #Orthostatic hypotension-  Due to intravascular depletion.   Now resolved post IVF.   Hold off on further IVF given CKD.     #HTN-  BP is currently acceptable.    #CKD IV-  Renal input noted.         Over 25 minutes spent on total encounter; more than 50% of the visit was spent counseling and/or coordinating care by the attending physician.      Bc Kang MD [facility]  Cardiovascular Disease  [phone]

## 2022-08-17 NOTE — DISCHARGE NOTE NURSING/CASE MANAGEMENT/SOCIAL WORK - NSDCPEFALRISK_GEN_ALL_CORE
For information on Fall & Injury Prevention, visit: [email or web address] OR  [email or web address] OR  https://www.cdc.gov/steadi/patient.html

## 2022-08-17 NOTE — DISCHARGE NOTE PROVIDER - CARE PROVIDER_API CALL
Bc Kang)  Internal Medicine  [address] [place] [address]  Phone: [phone]  Fax: [phone]  Follow Up Time:

## 2022-08-17 NOTE — DISCHARGE NOTE PROVIDER - NSDCCPCAREPLAN_GEN_ALL_CORE_FT
PRINCIPAL DISCHARGE DIAGNOSIS  Diagnosis: Syncope  Assessment and Plan of Treatment: You presented after an episode of passing out. You were found to have positive orthostatics (A drop in your blood pressure when you stand). You improved with IVF.  Your CT head and MRI of the brain were negative. Your EEG was negative for seizures. We stopped your AMLODIPINE to help with the drop in blood pressure. Continue aspirin and statin. Follow up with cardiology within 1-2 weeks of discharge. If you are in need of a general cardiologist after discharge, you may contact the [facility] for an appointment at [phone].

## 2022-08-17 NOTE — PROGRESS NOTE ADULT - REASON FOR ADMISSION
syncope

## 2022-08-17 NOTE — DIETITIAN INITIAL EVALUATION ADULT - ADD RECOMMEND
1) Continue current diet order: consistent carb (no snacks), renal diet.  2) Monitor weights, labs, BM's, skin integrity, p.o. intake.   3) RD available prn.

## 2022-08-17 NOTE — DISCHARGE NOTE NURSING/CASE MANAGEMENT/SOCIAL WORK - PATIENT PORTAL LINK FT
You can access the FollowMyHealth Patient Portal offered by [facility] by registering at the following website: [email or web address] By joining [email or web address] [email or web address] portal, you will also be able to view your health information using other applications (apps) compatible with our system.

## 2022-08-18 NOTE — ED ADULT NURSE NOTE - CARDIO ASSESSMENT
CHIEF COMPLAINT:  Presents because of +BCx from recent hospitalization    HPI: The patient is a 88-year-old male with history of CAD, DM2, HTN, HLD, BPH, prostate CA s/p rad was hospitalized on 22 and diagnosed with acute cholecystitis. He was recommended cholecystostomy because of his elevated cardiac risk and he refused. He was very upset about being made NPO as refused all treatments and wanted to go home. He was discharged home yesterday on Augmentin.  The patient was recalled to the emergency room because his blood culture grew gram variable rods.  The patient was started on IV Unasyn in the ER.  In the emergency room the patient developed sinus tachycardia/SVT with the elevated heart rate to 140. He was given coreg and the above antibiotics and SVT resolved.    22: Patient upset that he got coreg. He states he does not tolerate coreg because it causes him dizziness and only wants metoprolol. He also continues to refuse any procedures and just wanted to come to the hospital for the IV antibiotics. He is upset again that he is NPO.    He denies any fevers, chils, CP, palp, SOB, current abd pain, N/V, dizziness, syncope, orthopnea, PND    8/15/22 SVT on monitor,, patient offers no complaints    22 s/o IR GB drain placement, intermittent SVT    PMHx:  PAST MEDICAL & SURGICAL HISTORY:  BPH (benign prostatic hyperplasia)  HLD (hyperlipidemia)  GERD (gastroesophageal reflux disease)  Prostate cancer s/p radiation  CAD S/P percutaneous coronary angioplasty 2014  DM2  Pulm HTN    Social History:  Former smoker  Denies any significant etoh or any illicit drug use    FAMILY HISTORY:   FAMILY HISTORY:  Family history of MI (myocardial infarction) (Father)  Family history of uterine cancer (Mother)    ALLERGIES:  Allergies  No Known Allergies        Vital Signs Last 24 Hrs  T(C): 37.1 (17 Aug 2022 20:53), Max: 37.1 (17 Aug 2022 20:53)  T(F): 98.7 (17 Aug 2022 20:53), Max: 98.7 (17 Aug 2022 20:53)  HR: 62 (18 Aug 2022 06:30) (62 - 147)  BP: 147/85 (18 Aug 2022 06:30) (129/74 - 167/59)  BP(mean): 91 (17 Aug 2022 08:15) (91 - 91)  RR: 18 (18 Aug 2022 03:40) (18 - 18)  SpO2: 97% (18 Aug 2022 03:40) (95% - 97%)    Parameters below as of 18 Aug 2022 03:40  Patient On (Oxygen Delivery Method): room air          PHYSICAL EXAM:   Constitutional: awake and alert in NAD  HEENT: EOMI, Normal Hearing, MMM  Neck: Soft and supple, No LAD, mild + JVD, no carotid bruit  Respiratory: Breath sounds are clear bilaterally, No wheezing, rales or rhonchi, good air movement  Cardiovascular: S1 and S2, regular rate and rhythm, soft systolic murmur at LSB. PMI is nondisplaced  Gastrointestinal: perc-lor drain  Extremities: Warm and well perfused, no peripheral edema  Neurological: A/O x 3, no focal deficits appreciated  Skin: No rashes appreciated               MEDICATIONS  (STANDING):  clopidogrel Tablet 75 milliGRAM(s) Oral daily  cyanocobalamin 1000 MICROGram(s) Oral daily  dextrose 5%. 1000 milliLiter(s) (50 mL/Hr) IV Continuous <Continuous>  dextrose 5%. 1000 milliLiter(s) (100 mL/Hr) IV Continuous <Continuous>  dextrose 50% Injectable 25 Gram(s) IV Push once  dextrose 50% Injectable 12.5 Gram(s) IV Push once  dextrose 50% Injectable 25 Gram(s) IV Push once  diltiazem Infusion 5 mG/Hr (5 mL/Hr) IV Continuous <Continuous>  glucagon  Injectable 1 milliGRAM(s) IntraMuscular once  heparin   Injectable 5000 Unit(s) SubCutaneous every 12 hours  insulin lispro (ADMELOG) corrective regimen sliding scale   SubCutaneous at bedtime  insulin lispro (ADMELOG) corrective regimen sliding scale   SubCutaneous three times a day before meals  metoprolol tartrate 50 milliGRAM(s) Oral every 8 hours  piperacillin/tazobactam IVPB.. 3.375 Gram(s) IV Intermittent every 8 hours  polyethylene glycol 3350 17 Gram(s) Oral daily  senna 2 Tablet(s) Oral at bedtime  tamsulosin 0.4 milliGRAM(s) Oral at bedtime    MEDICATIONS  (PRN):  acetaminophen     Tablet .. 650 milliGRAM(s) Oral every 6 hours PRN Temp greater or equal to 38C (100.4F), Mild Pain (1 - 3)  aluminum hydroxide/magnesium hydroxide/simethicone Suspension 30 milliLiter(s) Oral every 4 hours PRN Dyspepsia  dextrose Oral Gel 15 Gram(s) Oral once PRN Blood Glucose LESS THAN 70 milliGRAM(s)/deciliter  HYDROmorphone  Injectable 0.5 milliGRAM(s) IV Push every 3 hours PRN Severe Pain (7 - 10)  melatonin 3 milliGRAM(s) Oral at bedtime PRN Insomnia  ondansetron Injectable 4 milliGRAM(s) IV Push once PRN Nausea and/or Vomiting  oxyCODONE    IR 5 milliGRAM(s) Oral every 6 hours PRN Moderate Pain (4 - 6)                   9.2    7.31  )-----------( 239      ( 17 Aug 2022 06:43 )             27.5       138  |  108  |  5<L>  ----------------------------<  119<H>  3.7   |  23  |  0.43<L>    Ca    7.6<L>      17 Aug 2022 06:43  Phos  2.1       Mg     2.2         TPro  5.4<L>  /  Alb  2.0<L>  /  TBili  0.3  /  DBili  0.1  /  AST  25  /  ALT  29  /  AlkPhos  81        LABS: All Labs Reviewed:                   BLOOD CULTURES: Organism --  Gram Stain Blood -- Gram Stain --  Specimen Source Clean Catch None  Culture-Blood --    Organism Blood Culture PCR  Gram Stain Blood -- Gram Stain   Growth in anaerobic bottle: Gram Variable Rods  Specimen Source .Blood None  Culture-Blood --    RADIOLOGY:    Reviewed in EMR    EK/13/22, my interpretation: NSR low voltage nonspecific ST changes     TELEMETRY:    Reviewed. SVT alt with SR    ECHO:    ACC: [id number] EXAM:  ECHO TTE WO CON COMP W DOPP                        PROCEDURE DATE:  2022      Indications   1) Z01.810 Encounter for preprocedural cardiovascular examination    M-Mode Measurements (cm)     LVEDd: 4.8 cm          LVESd: 3.02 cm   IVSEd: 1.23 cm   LVPWd: 1.13 cm            AO Root Dimension: 3.5 cm                             ACS: 1.9 cm                             LA: 3.8 cm    Doppler Measurements:     AV Velocity:146 cm/s               MV Peak E-Wave: 121 cm/s   AV Peak Gradient: 8.53 mmHg        MV Peak A-Wave: 122 cm/s                                      MV E/A Ratio: 0.99 %   TR Velocity:383 cm/s               MV Peak Gradient: 5.86 mmHg   TR Gradient:58.6756 mmHg   Estimated RAP:8 mmHg   RVSP:67 mmHg     Summary     The left ventricle is normal in size, wall motion, and contractility.   Mild concentric left ventricular hypertrophy is present.   Estimated left ventricular ejection fraction is 60-65 %.   Normal appearing right ventricle structure and function.   Mild aortic sclerosis is present with normal valvular opening.   Mild to moderate aortic regurgitation is present.   Mild mitral annular calcification is present.   The mitral valve leaflets appear mildly thickened.   Mild (1+) mitral regurgitation is present.   The tricuspid valve leaflets are thin and pliable; valve motion is normal.   Moderate (2+) eccentric tricuspid valve regurgitation is present.   Moderate to severe pulmonary hypertension.   Normal appearing pulmonic valve structure.   Mild pulmonic valvular regurgitation (1+) is present.   No evidence of pericardial effusion. ---

## 2023-04-21 NOTE — ED PROVIDER NOTE - ATTENDING CONTRIBUTION TO CARE
I have seen and examined the patient on the patient´s visit date. I have reviewed the note written by Sohan Mcfarlane [facility], on that visit day. I have supervised and participated as necessary in the performance of procedures indicated for patient management and was available at all phases of the patient´s visit when needed. We discussed the history, physical exam findings, mnagement plan, and  medical decision making. I have made my additons, exceptions, and revisions within the chart and I agree with H and P as documented in its entirety. The data and my interpretation of any data collected from labs, interventions and imaging appear below as well as my independent medical decision making and considerations    The patient is a 69y Male who has a past medical and surgery history of kidney stones BPH JUNI HTN DM rucurrent UTIS last treated for ECOLI bacteremia PTED with fever cough but also UTI s/s as described "last time I had a UTI"  Vital Signs Last 24 Hrs  T(F): 99.4 HR: 101 BP: 144/75 RR: 18 SpO2: 97% (14 Aug 2020 13:50) (97% - 97%)  PE: as described; my additions and exceptions are noted in the chart  DATA:   Lab Results: Pending  IMAGING:  MDM: Very high suspicion for urinary source  IMP (IRISK):  Management (Plan):  IVFS  antibiotics   cultures   APAP   Admit I have seen and examined the patient on the patient´s visit date. I have reviewed the note written by Sohan Mcfarlane [facility], on that visit day. I have supervised and participated as necessary in the performance of procedures indicated for patient management and was available at all phases of the patient´s visit when needed. We discussed the history, physical exam findings, mnagement plan, and  medical decision making. I have made my additons, exceptions, and revisions within the chart and I agree with H and P as documented in its entirety. The data and my interpretation of any data collected from labs, interventions and imaging appear below as well as my independent medical decision making and considerations    The patient is a 69y Male who has a past medical and surgery history of kidney stones BPH JUNI HTN DM rucurrent UTIS last treated for ECOLI bacteremia PTED with fever cough but also UTI s/s as described "last time I had a UTI"  Vital Signs Last 24 Hrs  T(F): 99.4 HR: 101 BP: 144/75 RR: 18 SpO2: 97% (14 Aug 2020 13:50) (97% - 97%)  PE: as described; my additions and exceptions are noted in the chart    DATA:   Lab Results: Pending                        12.7   6.16  )-----------( 187      ( 14 Aug 2020 14:55 )             36.9   08-14    136  |  99  |  23  ----------------------------<  247<H>  3.5   |  22  |  2.03<H>    Ca    8.9      14 Aug 2020 14:55    TPro  7.2  /  Alb  3.8  /  TBili  0.3  /  DBili  x   /  AST  16  /  ALT  9   /  AlkPhos  64  08-14    Urinalysis Basic - ( 14 Aug 2020 14:58 ) Color: LIGHT YELLOW / Appearance: CLEAR / S.016 / pH: 6.5  Gluc: NEGATIVE / Ketone: NEGATIVE  / Bili: NEGATIVE / Urobili: NORMAL   Blood: TRACE / Protein: 200 / Nitrite: NEGATIVE   Leuk Esterase: NEGATIVE / RBC: 26-50 / WBC 0-2   Sq Epi: OCC / Non Sq Epi: x / Bacteria: NEGATIVE    IMAGING: XRay ALDAIR/E/no PTX  MDM: Very high suspicion for urinary source  IMP (IRISK):  Management (Plan):  IVFS  cultures   APAP   Reassess   dispo as per results and response; if negative d/c and followup cultures as an outpt vs admission of pt fails to improve of labs/clinical picture deteriorates precipitously PAST SURGICAL HISTORY:  2002   h/o hysterectomy due to Fibroid--post op vaginal bleeding requiring a transfusion     2008  repair of ventral hernia with mesh Revision 2018    Cancer 2012  insertion of mediport -- to be excised on 9-22-14    Cataract Bilateral 2017    Radical mastectomy of right breast 3/30/12    S/P arthroscopy of left shoulder     Termination of pregnancy (fetus) x 3     I have seen and examined the patient on the patient´s visit date. I have reviewed the note written by Sohan Mcfarlane [facility], on that visit day. I have supervised and participated as necessary in the performance of procedures indicated for patient management and was available at all phases of the patient´s visit when needed. We discussed the history, physical exam findings, mnagement plan, and  medical decision making. I have made my additons, exceptions, and revisions within the chart and I agree with H and P as documented in its entirety. The data and my interpretation of any data collected from labs, interventions and imaging appear below as well as my independent medical decision making and considerations    The patient is a 69y Male who has a past medical and surgery history of kidney stones BPH JUNI HTN DM rucurrent UTIS last treated for ECOLI bacteremia PTED with fever cough but also UTI s/s as described "last time I had a UTI"  Vital Signs Last 24 Hrs  T(F): 99.4 HR: 101 BP: 144/75 RR: 18 SpO2: 97% (14 Aug 2020 13:50) (97% - 97%)  PE: as described; my additions and exceptions are noted in the chart    DATA:   Lab Results: Pending                        12.7   6.16  )-----------( 187      ( 14 Aug 2020 14:55 )             36.9   08-14    136  |  99  |  23  ----------------------------<  247<H>  3.5   |  22  |  2.03<H>    Ca    8.9      14 Aug 2020 14:55    TPro  7.2  /  Alb  3.8  /  TBili  0.3  /  DBili  x   /  AST  16  /  ALT  9   /  AlkPhos  64  08-14    Urinalysis Basic - ( 14 Aug 2020 14:58 ) Color: LIGHT YELLOW / Appearance: CLEAR / S.016 / pH: 6.5  Gluc: NEGATIVE / Ketone: NEGATIVE  / Bili: NEGATIVE / Urobili: NORMAL   Blood: TRACE / Protein: 200 / Nitrite: NEGATIVE   Leuk Esterase: NEGATIVE / RBC: 26-50 / WBC 0-2   Sq Epi: OCC / Non Sq Epi: x / Bacteria: NEGATIVE    IMAGING: XRay ALDAIR/E/no PTX  MDM: Very high suspicion for urinary source  IMP (IRISK):  Management (Plan):  IVFS  cultures   APAP   Reassess   dispo as per results and response; if negative d/c and followup cultures as an outpt vs admission of pt fails to improve of labs/clinical picture deteriorates precipitously; case discussed with pcp who agrees with plan and will follow cultures

## 2023-09-08 NOTE — PATIENT PROFILE ADULT - NSPROPTRIGHTSUPPORTPERSON_GEN_A_NUR
Detail Level: Detailed declines Quality 431: Preventive Care And Screening: Unhealthy Alcohol Use - Screening: Patient not identified as an unhealthy alcohol user when screened for unhealthy alcohol use using a systematic screening method Quality 226: Preventive Care And Screening: Tobacco Use: Screening And Cessation Intervention: Patient screened for tobacco use and is an ex/non-smoker

## 2023-10-24 PROBLEM — N18.4 CHRONIC KIDNEY DISEASE, STAGE 4 (SEVERE): Chronic | Status: ACTIVE | Noted: 2022-08-10

## 2023-11-08 ENCOUNTER — APPOINTMENT (OUTPATIENT)
Dept: VASCULAR SURGERY | Facility: CLINIC | Age: 73
End: 2023-11-08

## 2024-04-29 PROBLEM — Z00.00 ENCOUNTER FOR PREVENTIVE HEALTH EXAMINATION: Status: ACTIVE | Noted: 2024-04-29

## 2024-04-30 NOTE — ED ADULT TRIAGE NOTE - ACCOMPANIED BY
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 They will need to guide you in terms of work restrictions moving forward.     Keep your follow up appointment regarding your suspected POTS.    If you have new concerns or wish to have other therapy in the emergency department you can always return.    Self

## 2024-05-01 ENCOUNTER — APPOINTMENT (OUTPATIENT)
Dept: VASCULAR SURGERY | Facility: CLINIC | Age: 74
End: 2024-05-01
Payer: MEDICARE

## 2024-05-01 VITALS
BODY MASS INDEX: 22.99 KG/M2 | HEART RATE: 64 BPM | WEIGHT: 138 LBS | SYSTOLIC BLOOD PRESSURE: 118 MMHG | HEIGHT: 65 IN | DIASTOLIC BLOOD PRESSURE: 74 MMHG

## 2024-05-01 DIAGNOSIS — N40.0 BENIGN PROSTATIC HYPERPLASIA WITHOUT LOWER URINARY TRACT SYMPMS: ICD-10-CM

## 2024-05-01 DIAGNOSIS — Z86.39 PERSONAL HISTORY OF OTHER ENDOCRINE, NUTRITIONAL AND METABOLIC DISEASE: ICD-10-CM

## 2024-05-01 DIAGNOSIS — I10 ESSENTIAL (PRIMARY) HYPERTENSION: ICD-10-CM

## 2024-05-01 DIAGNOSIS — Z87.448 PERSONAL HISTORY OF OTHER DISEASES OF URINARY SYSTEM: ICD-10-CM

## 2024-05-01 PROCEDURE — 99204 OFFICE O/P NEW MOD 45 MIN: CPT

## 2024-05-01 PROCEDURE — 93990 DOPPLER FLOW TESTING: CPT

## 2024-05-01 RX ORDER — LATANOPROST/PF 0.005 %
DROPS OPHTHALMIC (EYE)
Refills: 0 | Status: ACTIVE | COMMUNITY

## 2024-05-01 RX ORDER — ASPIRIN 81 MG
81 TABLET, DELAYED RELEASE (ENTERIC COATED) ORAL
Refills: 0 | Status: ACTIVE | COMMUNITY

## 2024-05-01 RX ORDER — DAPAGLIFLOZIN 10 MG/1
TABLET, FILM COATED ORAL
Refills: 0 | Status: ACTIVE | COMMUNITY

## 2024-05-01 RX ORDER — METOPROLOL SUCCINATE 50 MG/1
50 TABLET, EXTENDED RELEASE ORAL
Refills: 0 | Status: ACTIVE | COMMUNITY

## 2024-05-01 RX ORDER — AMLODIPINE BESYLATE 5 MG/1
TABLET ORAL
Refills: 0 | Status: ACTIVE | COMMUNITY

## 2024-05-01 RX ORDER — PANTOPRAZOLE SODIUM 20 MG/1
TABLET, DELAYED RELEASE ORAL
Refills: 0 | Status: ACTIVE | COMMUNITY

## 2024-05-01 RX ORDER — ATORVASTATIN CALCIUM 80 MG/1
TABLET, FILM COATED ORAL
Refills: 0 | Status: ACTIVE | COMMUNITY

## 2024-05-01 RX ORDER — TAMSULOSIN HYDROCHLORIDE 0.4 MG/1
CAPSULE ORAL
Refills: 0 | Status: ACTIVE | COMMUNITY

## 2024-05-01 RX ORDER — ERGOCALCIFEROL 1.25 MG/1
CAPSULE ORAL
Refills: 0 | Status: ACTIVE | COMMUNITY

## 2024-05-01 RX ORDER — INSULIN DETEMIR 100 [IU]/ML
INJECTION, SOLUTION SUBCUTANEOUS
Refills: 0 | Status: ACTIVE | COMMUNITY

## 2024-05-01 RX ORDER — SODIUM BICARBONATE 650 MG/1
TABLET ORAL
Refills: 0 | Status: ACTIVE | COMMUNITY

## 2024-05-01 RX ORDER — TIMOLOL/BRIMONIDIN/DORZOLAM/PF 0.5-.15-2%
DROPS OPHTHALMIC (EYE)
Refills: 0 | Status: ACTIVE | COMMUNITY

## 2024-05-01 RX ORDER — FENOFIBRATE 150 MG/1
CAPSULE ORAL
Refills: 0 | Status: ACTIVE | COMMUNITY

## 2024-05-07 NOTE — ASSESSMENT
[FreeTextEntry1] : 73M CKD progressing to ESRD needing permanent access  f/u nephro re: timing of need for HD VDUS: appropriately sized and open LUE forearm and upper arm veins for AVF Will protect LUE for AVF Will plan for creation after medical risk stratification   Risks (bleeding, infection, nonmaturation, steal syndrome), benefits (long term access with low risk of infection and thrombosis) and alternatives (catheter) of AVF creation discussed with patient and she is agreeable to surgery

## 2024-05-07 NOTE — PHYSICAL EXAM
[Respiratory Effort] : normal respiratory effort [Normal Rate and Rhythm] : normal rate and rhythm [2+] : left 2+ [Ankle Swelling (On Exam)] : not present [Varicose Veins Of Lower Extremities] : not present [Abdomen Masses] : No abdominal masses [] : not present [Abdomen Tenderness] : ~T ~M No abdominal tenderness [Abdominal Bruit] : no abdominal bruit  [de-identified] : nad

## 2024-05-07 NOTE — HISTORY OF PRESENT ILLNESS
[FreeTextEntry1] : 73M CKD progressing to ESRD. Pt will be needing HD soon per nephrology. Thus she needs  permanent HD Access. Right hand dominant. No previous catheters, ICDs, PPM. No LUE surgeries in the past. Denies any ischemic ulcers or pain in LUE. Otherwise doing well. Has DM and ESRD but no other medical comorbidities.

## 2024-05-20 ENCOUNTER — OUTPATIENT (OUTPATIENT)
Dept: OUTPATIENT SERVICES | Facility: HOSPITAL | Age: 74
LOS: 1 days | End: 2024-05-20

## 2024-05-20 VITALS
WEIGHT: 141.98 LBS | OXYGEN SATURATION: 98 % | RESPIRATION RATE: 16 BRPM | HEIGHT: 64 IN | DIASTOLIC BLOOD PRESSURE: 62 MMHG | SYSTOLIC BLOOD PRESSURE: 104 MMHG | HEART RATE: 62 BPM | TEMPERATURE: 98 F

## 2024-05-20 DIAGNOSIS — E11.9 TYPE 2 DIABETES MELLITUS WITHOUT COMPLICATIONS: ICD-10-CM

## 2024-05-20 DIAGNOSIS — N18.6 END STAGE RENAL DISEASE: ICD-10-CM

## 2024-05-20 DIAGNOSIS — Z91.89 OTHER SPECIFIED PERSONAL RISK FACTORS, NOT ELSEWHERE CLASSIFIED: ICD-10-CM

## 2024-05-20 DIAGNOSIS — I10 ESSENTIAL (PRIMARY) HYPERTENSION: ICD-10-CM

## 2024-05-20 DIAGNOSIS — H26.9 UNSPECIFIED CATARACT: Chronic | ICD-10-CM

## 2024-05-20 RX ORDER — DEXTROSE 50 % IN WATER 50 %
25 SYRINGE (ML) INTRAVENOUS ONCE
Refills: 0 | Status: DISCONTINUED | OUTPATIENT
Start: 2024-05-24 | End: 2024-06-07

## 2024-05-20 RX ORDER — ATORVASTATIN CALCIUM 80 MG/1
1 TABLET, FILM COATED ORAL
Qty: 0 | Refills: 0 | DISCHARGE

## 2024-05-20 RX ORDER — CALCIUM ACETATE 667 MG
1 TABLET ORAL
Qty: 0 | Refills: 0 | DISCHARGE

## 2024-05-20 RX ORDER — DEXTROSE 50 % IN WATER 50 %
12.5 SYRINGE (ML) INTRAVENOUS ONCE
Refills: 0 | Status: DISCONTINUED | OUTPATIENT
Start: 2024-05-24 | End: 2024-06-07

## 2024-05-20 RX ORDER — SODIUM CHLORIDE 9 MG/ML
1000 INJECTION, SOLUTION INTRAVENOUS
Refills: 0 | Status: DISCONTINUED | OUTPATIENT
Start: 2024-05-24 | End: 2024-06-07

## 2024-05-20 RX ORDER — DEXTROSE 50 % IN WATER 50 %
15 SYRINGE (ML) INTRAVENOUS ONCE
Refills: 0 | Status: DISCONTINUED | OUTPATIENT
Start: 2024-05-24 | End: 2024-06-07

## 2024-05-20 RX ORDER — FAMOTIDINE 10 MG/ML
1 INJECTION INTRAVENOUS
Qty: 0 | Refills: 0 | DISCHARGE

## 2024-05-20 RX ORDER — TAMSULOSIN HYDROCHLORIDE 0.4 MG/1
1 CAPSULE ORAL
Qty: 0 | Refills: 0 | DISCHARGE

## 2024-05-20 RX ORDER — SODIUM CHLORIDE 9 MG/ML
3 INJECTION INTRAMUSCULAR; INTRAVENOUS; SUBCUTANEOUS EVERY 8 HOURS
Refills: 0 | Status: DISCONTINUED | OUTPATIENT
Start: 2024-05-24 | End: 2024-06-07

## 2024-05-20 RX ORDER — GLUCAGON INJECTION, SOLUTION 0.5 MG/.1ML
1 INJECTION, SOLUTION SUBCUTANEOUS ONCE
Refills: 0 | Status: DISCONTINUED | OUTPATIENT
Start: 2024-05-24 | End: 2024-06-07

## 2024-05-20 RX ORDER — METOPROLOL TARTRATE 50 MG
1 TABLET ORAL
Qty: 0 | Refills: 0 | DISCHARGE

## 2024-05-20 RX ORDER — ASPIRIN/CALCIUM CARB/MAGNESIUM 324 MG
1 TABLET ORAL
Qty: 0 | Refills: 0 | DISCHARGE

## 2024-05-20 RX ORDER — INSULIN DETEMIR 100/ML (3)
15 INSULIN PEN (ML) SUBCUTANEOUS
Qty: 0 | Refills: 0 | DISCHARGE

## 2024-05-20 NOTE — H&P PST ADULT - NSICDXPASTMEDICALHX_GEN_ALL_CORE_FT
PAST MEDICAL HISTORY:  Constipation     DM (diabetes mellitus)     End-stage renal disease (ESRD)     GERD (gastroesophageal reflux disease)     History of BPH     [place] (hard of hearing)     HTN (hypertension)     Stage 4 chronic kidney disease

## 2024-05-20 NOTE — H&P PST ADULT - PROBLEM SELECTOR PLAN 3
Patient instructed to take amlodipine, toprol on day of procedure with small sips of water, verbalized understanding.

## 2024-05-20 NOTE — H&P PST ADULT - SKIN/BREAST COMMENTS
hyperpigmentation of bilateral lower extremities "due to diabetes" improving with prescribed topical ointment

## 2024-05-20 NOTE — H&P PST ADULT - HISTORY OF PRESENT ILLNESS
73 Male with History of CKD progressing to ESRD. Pt will be needing HD soon per nephrology. Patient is scheduled for left arm arteriovenous fistula creation

## 2024-05-20 NOTE — H&P PST ADULT - NSICDXFAMILYHX_GEN_ALL_CORE_FT
FAMILY HISTORY:  Father  Still living? No  Family history of diabetes mellitus (DM), Age at diagnosis: Age Unknown    Mother  Still living? No  FH: heart disease, Age at diagnosis: Age Unknown  FH: stroke, Age at diagnosis: Age Unknown    Sibling  Still living? Yes, Estimated age: Age Unknown  Family history of diabetes mellitus (DM), Age at diagnosis: Age Unknown  Family history of diabetes mellitus (DM), Age at diagnosis: Age Unknown

## 2024-05-20 NOTE — H&P PST ADULT - PROBLEM SELECTOR PLAN 1
Patient scheduled for surgery on: 5/24/24  Provided with verbal and written presurgical instructions  Verbalized understanding  with teach back on the following: chlorhexidine wash    Lab specimen, echo, ekg in chart Patient scheduled for surgery on: 5/24/24  Provided with verbal and written presurgical instructions  Verbalized understanding  with teach back on the following: chlorhexidine wash    Lab specimen, echo, ekg in chart  Last cardiology note requested for abnormal EKG

## 2024-05-20 NOTE — H&P PST ADULT - PROBLEM SELECTOR PLAN 4
Patient instructed on the following medications adjustments: Hold farxiaga after today's dose, and reduce Levemir to 9 units on 5/23/24  POCT glucose testing ordered STAT upon admission and Hypoglycemia protocol for history of insulin use Patient instructed on the following medications adjustments: Hold Farxiga after today's dose, and reduce Levemir to 9 units on 5/23/24  POCT glucose testing ordered STAT upon admission and Hypoglycemia protocol for history of insulin use

## 2024-05-23 ENCOUNTER — TRANSCRIPTION ENCOUNTER (OUTPATIENT)
Age: 74
End: 2024-05-23

## 2024-05-23 NOTE — ASU PATIENT PROFILE, ADULT - NSICDXPASTMEDICALHX_GEN_ALL_CORE_FT
PAST MEDICAL HISTORY:  Constipation     DM (diabetes mellitus)     End-stage renal disease (ESRD)     GERD (gastroesophageal reflux disease)     History of BPH     [place] (hard of hearing)     HTN (hypertension)     Stage 4 chronic kidney disease

## 2024-05-24 ENCOUNTER — OUTPATIENT (OUTPATIENT)
Dept: OUTPATIENT SERVICES | Facility: HOSPITAL | Age: 74
LOS: 1 days | Discharge: HOME | End: 2024-05-24
Payer: MEDICARE

## 2024-05-24 ENCOUNTER — TRANSCRIPTION ENCOUNTER (OUTPATIENT)
Age: 74
End: 2024-05-24

## 2024-05-24 ENCOUNTER — APPOINTMENT (OUTPATIENT)
Dept: VASCULAR SURGERY | Facility: HOSPITAL | Age: 74
End: 2024-05-24

## 2024-05-24 VITALS
SYSTOLIC BLOOD PRESSURE: 128 MMHG | OXYGEN SATURATION: 99 % | WEIGHT: 141.98 LBS | TEMPERATURE: 98 F | RESPIRATION RATE: 14 BRPM | HEART RATE: 60 BPM | DIASTOLIC BLOOD PRESSURE: 62 MMHG | HEIGHT: 64 IN

## 2024-05-24 VITALS — HEART RATE: 62 BPM | OXYGEN SATURATION: 98 % | RESPIRATION RATE: 17 BRPM

## 2024-05-24 DIAGNOSIS — H26.9 UNSPECIFIED CATARACT: Chronic | ICD-10-CM

## 2024-05-24 DIAGNOSIS — N18.6 END STAGE RENAL DISEASE: ICD-10-CM

## 2024-05-24 LAB
ANION GAP SERPL CALC-SCNC: 13 MMOL/L — SIGNIFICANT CHANGE UP (ref 7–14)
BUN SERPL-MCNC: 55 MG/DL — HIGH (ref 7–23)
CALCIUM SERPL-MCNC: 8.9 MG/DL — SIGNIFICANT CHANGE UP (ref 8.4–10.5)
CHLORIDE SERPL-SCNC: 106 MMOL/L — SIGNIFICANT CHANGE UP (ref 98–107)
CO2 SERPL-SCNC: 18 MMOL/L — LOW (ref 22–31)
CREAT SERPL-MCNC: 4.33 MG/DL — HIGH (ref 0.5–1.3)
EGFR: 14 ML/MIN/1.73M2 — LOW
GLUCOSE SERPL-MCNC: 75 MG/DL — SIGNIFICANT CHANGE UP (ref 70–99)
POTASSIUM BLDV-SCNC: 4 MMOL/L — SIGNIFICANT CHANGE UP (ref 3.5–5.1)
POTASSIUM SERPL-MCNC: 5.9 MMOL/L — HIGH (ref 3.5–5.3)
POTASSIUM SERPL-SCNC: 5.9 MMOL/L — HIGH (ref 3.5–5.3)
SODIUM SERPL-SCNC: 137 MMOL/L — SIGNIFICANT CHANGE UP (ref 135–145)

## 2024-05-24 PROCEDURE — 36821 AV FUSION DIRECT ANY SITE: CPT | Mod: LT

## 2024-05-24 DEVICE — LIGATING CLIPS WECK HORIZON SMALL-WIDE (RED) 24: Type: IMPLANTABLE DEVICE | Status: FUNCTIONAL

## 2024-05-24 DEVICE — SURGICEL FIBRILLAR 2 X 4": Type: IMPLANTABLE DEVICE | Status: FUNCTIONAL

## 2024-05-24 RX ORDER — CHOLECALCIFEROL (VITAMIN D3) 125 MCG
0 CAPSULE ORAL
Refills: 0 | DISCHARGE

## 2024-05-24 RX ORDER — PANTOPRAZOLE SODIUM 20 MG/1
1 TABLET, DELAYED RELEASE ORAL
Refills: 0 | DISCHARGE

## 2024-05-24 RX ORDER — INSULIN DETEMIR 100/ML (3)
12 INSULIN PEN (ML) SUBCUTANEOUS
Refills: 0 | DISCHARGE

## 2024-05-24 RX ORDER — FENOFIBRATE,MICRONIZED 130 MG
1 CAPSULE ORAL
Refills: 0 | DISCHARGE

## 2024-05-24 RX ORDER — ATORVASTATIN CALCIUM 80 MG/1
1 TABLET, FILM COATED ORAL
Refills: 0 | DISCHARGE

## 2024-05-24 RX ORDER — ASPIRIN/CALCIUM CARB/MAGNESIUM 324 MG
1 TABLET ORAL
Refills: 0 | DISCHARGE

## 2024-05-24 RX ORDER — DAPAGLIFLOZIN 10 MG/1
1 TABLET, FILM COATED ORAL
Refills: 0 | DISCHARGE

## 2024-05-24 RX ORDER — SENNA PLUS 8.6 MG/1
2 TABLET ORAL
Refills: 0 | DISCHARGE

## 2024-05-24 RX ORDER — TAMSULOSIN HYDROCHLORIDE 0.4 MG/1
1 CAPSULE ORAL
Refills: 0 | DISCHARGE

## 2024-05-24 RX ORDER — METOPROLOL TARTRATE 50 MG
1 TABLET ORAL
Refills: 0 | DISCHARGE

## 2024-05-24 RX ORDER — CALCIUM ACETATE 667 MG
1 TABLET ORAL
Refills: 0 | DISCHARGE

## 2024-05-24 RX ORDER — AMLODIPINE BESYLATE 2.5 MG/1
1 TABLET ORAL
Refills: 0 | DISCHARGE

## 2024-05-24 RX ORDER — SODIUM BICARBONATE 1 MEQ/ML
1 SYRINGE (ML) INTRAVENOUS
Refills: 0 | DISCHARGE

## 2024-05-24 NOTE — ASU PATIENT PROFILE, ADULT - NSICDXPASTMEDICALHX_GEN_ALL_CORE_FT
PAST MEDICAL HISTORY:  Constipation     DM (diabetes mellitus)     End-stage renal disease (ESRD)     GERD (gastroesophageal reflux disease)     History of BPH     [place] (hard of hearing)     HTN (hypertension)     Stage 4 chronic kidney disease

## 2024-05-24 NOTE — ASU DISCHARGE PLAN (ADULT/PEDIATRIC) - NS MD DC FALL RISK RISK
For information on Fall & Injury Prevention, visit: [email or web address] OR  [email or web address] OR  https://www.cdc.gov/steadi/patient.html

## 2024-05-24 NOTE — ASU DISCHARGE PLAN (ADULT/PEDIATRIC) - CARE PROVIDER_API CALL
Bria Su  Vascular Surgery  [address], [address]  [place] [address]  Phone: [phone]  Fax: [phone]  Established Patient  Follow Up Time: 2 weeks

## 2024-05-28 PROBLEM — N18.6 END STAGE RENAL DISEASE: Chronic | Status: ACTIVE | Noted: 2024-05-20

## 2024-05-28 PROBLEM — K59.00 CONSTIPATION, UNSPECIFIED: Chronic | Status: ACTIVE | Noted: 2024-05-20

## 2024-05-28 PROBLEM — Z87.438 PERSONAL HISTORY OF OTHER DISEASES OF MALE GENITAL ORGANS: Chronic | Status: ACTIVE | Noted: 2024-05-20

## 2024-05-28 PROBLEM — K21.9 GASTRO-ESOPHAGEAL REFLUX DISEASE WITHOUT ESOPHAGITIS: Chronic | Status: ACTIVE | Noted: 2024-05-20

## 2024-05-28 PROBLEM — H91.90 UNSPECIFIED HEARING LOSS, UNSPECIFIED EAR: Chronic | Status: ACTIVE | Noted: 2024-05-20

## 2024-05-28 LAB — GLUCOSE BLDC GLUCOMTR-MCNC: 80 MG/DL — SIGNIFICANT CHANGE UP (ref 70–99)

## 2024-06-19 ENCOUNTER — APPOINTMENT (OUTPATIENT)
Dept: VASCULAR SURGERY | Facility: CLINIC | Age: 74
End: 2024-06-19
Payer: MEDICARE

## 2024-06-19 VITALS
BODY MASS INDEX: 24.16 KG/M2 | SYSTOLIC BLOOD PRESSURE: 147 MMHG | WEIGHT: 145 LBS | DIASTOLIC BLOOD PRESSURE: 62 MMHG | HEART RATE: 58 BPM | HEIGHT: 65 IN | TEMPERATURE: 98 F

## 2024-06-19 DIAGNOSIS — N18.9 CHRONIC KIDNEY DISEASE, UNSPECIFIED: ICD-10-CM

## 2024-06-19 PROCEDURE — 99024 POSTOP FOLLOW-UP VISIT: CPT

## 2024-06-19 NOTE — PHYSICAL EXAM
[2+] : left 2+ [Ankle Swelling (On Exam)] : not present [Varicose Veins Of Lower Extremities] : not present [] : not present [No Rash or Lesion] : No rash or lesion [Alert] : alert [Oriented to Person] : oriented to person [Oriented to Place] : oriented to place [Oriented to Time] : oriented to time [Calm] : calm [de-identified] : NAD [de-identified] : NCAT [de-identified] : unlabored breathing [FreeTextEntry1] : LUE incision c/d/i, healing well no swelling, erythema or drainage good palpable thrill over left avf brisk capillary refill < 2 sec bilateral lower extremities soft, warm and nontender intact skin no wounds [de-identified] : EMAL [de-identified] : cooperative

## 2024-06-19 NOTE — HISTORY OF PRESENT ILLNESS
[FreeTextEntry1] : Pt presents for post op visit. He is s/p left radiocephalic avf on 5/24/24. LUE incision c/d/i, healing well. Denies LUE pain, swelling, numbness or tingling. He is not on HD yet. He follows up with Dr. Gem Chowdary. Denies claudication, rest pain or wounds.

## 2024-07-10 ENCOUNTER — APPOINTMENT (OUTPATIENT)
Dept: VASCULAR SURGERY | Facility: CLINIC | Age: 74
End: 2024-07-10
Payer: MEDICARE

## 2024-07-10 VITALS
BODY MASS INDEX: 24.16 KG/M2 | SYSTOLIC BLOOD PRESSURE: 158 MMHG | DIASTOLIC BLOOD PRESSURE: 73 MMHG | WEIGHT: 145 LBS | HEART RATE: 56 BPM | TEMPERATURE: 97.9 F | HEIGHT: 65 IN

## 2024-07-10 DIAGNOSIS — N18.9 CHRONIC KIDNEY DISEASE, UNSPECIFIED: ICD-10-CM

## 2024-07-10 PROCEDURE — 93990 DOPPLER FLOW TESTING: CPT

## 2024-07-10 PROCEDURE — 99214 OFFICE O/P EST MOD 30 MIN: CPT

## 2024-07-10 PROCEDURE — 99024 POSTOP FOLLOW-UP VISIT: CPT

## 2024-07-24 NOTE — ASU PREOP CHECKLIST - 2.
Medication: Escitalopram  Last office visit date: 1/2/24  Medication Refill Protocol Failed.  Failed criteria:  Medication (including dose and sig) on current meds list. Sent to clinician to review.     Per PCP, patient needs an appointment   Return in about 6 months (around 7/2/2024)    Yes repeat K 5.9 MD made aware. repeat for results level 4

## 2024-09-18 NOTE — ED PROVIDER NOTE - NS_EDPROVIDERDISPOUSERTYPE_ED_A_ED
Attending Attestation (For Attendings USE Only)... no anterior or posterior cervical lymphadenopathy

## 2024-10-16 ENCOUNTER — APPOINTMENT (OUTPATIENT)
Dept: VASCULAR SURGERY | Facility: CLINIC | Age: 74
End: 2024-10-16
Payer: MEDICARE

## 2024-10-16 VITALS
HEART RATE: 59 BPM | SYSTOLIC BLOOD PRESSURE: 143 MMHG | TEMPERATURE: 98.1 F | DIASTOLIC BLOOD PRESSURE: 74 MMHG | HEIGHT: 65 IN | BODY MASS INDEX: 24.16 KG/M2 | WEIGHT: 145 LBS

## 2024-10-16 DIAGNOSIS — N18.9 CHRONIC KIDNEY DISEASE, UNSPECIFIED: ICD-10-CM

## 2024-10-16 PROCEDURE — 99213 OFFICE O/P EST LOW 20 MIN: CPT

## 2024-10-16 PROCEDURE — 93990 DOPPLER FLOW TESTING: CPT

## 2024-12-04 NOTE — PATIENT PROFILE ADULT. - NSTOBACCONEVERSMOKERY/N_GEN_A
PATIENT INSTRUCTIONS    Treatment:    ICE :  Ice:  apply ice for 20 minutes 3 times a day.  No barrier between the ice and skin is needed when using cubes or frozen peas.  If you are using a chemical ice pack please place a barrier between the ice pack and your skin    Extra Strength Tylenol (500 mg tablets):    Take 2 tablet(s) up to 3 times a day as needed for pain. Do not exceed 3000 mg per day (of all Tylenol products)     Mobic: take 1 tablet 1 time a day for 10 days as directed.  Do not take any other anti-inflammatory medication while taking this medication.    Follow-Up:  Please make an appointment with  Dr. Benjamin Ferraro in 8 weeks         Time left: 12/4/2024 1:03 PM     Please note: 24 hour notice for cancellation of appointment is required.    You may receive a survey in the mail, or via the e-mail address that you have provided.  We would appreciate if you could fill out the survey and provide us with any feedback on your experience regarding your visit today. Thank you for allowing us to provide you with your health care needs.     Do not hesitate to call if you are experiencing severe pain, worsening or change in your pain, have symptoms of infection (fever, warmth, redness, increased drainage), or have any other problem that concerns you ~ [phone] (or [phone] after hours).    Please remember when requesting refills on pain medication that the request should be made by Thursday at the latest. [facility] is open Monday-Friday, 8am-5pm, and closed on the weekends.  No narcotic refills will be filled after hours.    Additional Educational Resources:  For additional resources regarding your symptoms, diagnosis, or further health information, please visit the Health Resources section on [email or web address] or the Online Health Resources section in [employer].      
No

## 2025-04-29 NOTE — H&P ADULT - PROBLEM SELECTOR PLAN 2
26-Apr-2025 17:43
- Home regimen: insulin detemir 15U subq before lunch once daily; farxiga  - hetal qas - goal 140-180  - start weight-based insulin with basal glargine qhs and pre-prandial lispro qac TID plus correction dose sliding scale  - advised patient to not take his own medications

## (undated) DEVICE — SUT MONOCRYL 4-0 27" PS-2 UNDYED

## (undated) DEVICE — SYNOVIS VASCULAR PROBE 1.5MM 15CM

## (undated) DEVICE — DRAPE TOWEL BLUE 17" X 24"

## (undated) DEVICE — DRAPE 3/4 SHEET 52X76"

## (undated) DEVICE — BAG DECANTER DISP

## (undated) DEVICE — PREP CHLORAPREP HI-LITE ORANGE 26ML

## (undated) DEVICE — SUT PROLENE 7-0 24" BV-1

## (undated) DEVICE — WARMING BLANKET LOWER ADULT

## (undated) DEVICE — SUT SILK 4-0 17-18"

## (undated) DEVICE — SUT SILK 3-0 18" TIES

## (undated) DEVICE — VENODYNE/SCD SLEEVE CALF MEDIUM

## (undated) DEVICE — DURABLE MEDICAL EQUIPMENT: Type: DURABLE MEDICAL EQUIPMENT

## (undated) DEVICE — ELCTR GROUNDING PAD ADULT COVIDIEN

## (undated) DEVICE — SOL IRR BAG NS 0.9% 1000ML

## (undated) DEVICE — NDL HYPO SAFE 25G X 5/8" (ORANGE)

## (undated) DEVICE — SOL BAG NS 0.9% 1000ML

## (undated) DEVICE — DRSG CURITY GAUZE SPONGE 4 X 4" 12-PLY

## (undated) DEVICE — SUT VICRYL 3-0 27" SH UNDYED

## (undated) DEVICE — SUT SILK 2-0 18" TIES

## (undated) DEVICE — SUT PROLENE 6-0 24" BV-1

## (undated) DEVICE — DRAPE HAND 77" X 146"

## (undated) DEVICE — POSITIONER STRAP ARMBOARD VELCRO TS-30

## (undated) DEVICE — GEL AQUSNC PACKET 20GR

## (undated) DEVICE — SYR LUER LOK 10CC

## (undated) DEVICE — CLAMP BULLDOG MIDI 45 DEGREE (GREEN) DISP

## (undated) DEVICE — VESSEL LOOP MINI-BLUE 0.075" X 16"

## (undated) DEVICE — SLING SHOULDER IMMOBILIZER CLINIC MEDIUM

## (undated) DEVICE — DRSG TEGADERM 2.5X3"

## (undated) DEVICE — PACK AV FISTULA